# Patient Record
Sex: FEMALE | Race: WHITE | NOT HISPANIC OR LATINO | Employment: FULL TIME | URBAN - NONMETROPOLITAN AREA
[De-identification: names, ages, dates, MRNs, and addresses within clinical notes are randomized per-mention and may not be internally consistent; named-entity substitution may affect disease eponyms.]

---

## 2023-03-01 LAB
ALANINE AMINOTRANSFERASE (SGPT) (U/L) IN SER/PLAS: 53 U/L (ref 7–45)
ALBUMIN (G/DL) IN SER/PLAS: 4 G/DL (ref 3.4–5)
ALKALINE PHOSPHATASE (U/L) IN SER/PLAS: 77 U/L (ref 33–136)
ANION GAP IN SER/PLAS: 12 MMOL/L (ref 10–20)
ASPARTATE AMINOTRANSFERASE (SGOT) (U/L) IN SER/PLAS: 44 U/L (ref 9–39)
BASOPHILS (10*3/UL) IN BLOOD BY AUTOMATED COUNT: 0.06 X10E9/L (ref 0–0.1)
BASOPHILS/100 LEUKOCYTES IN BLOOD BY AUTOMATED COUNT: 0.5 % (ref 0–2)
BILIRUBIN TOTAL (MG/DL) IN SER/PLAS: 0.3 MG/DL (ref 0–1.2)
CALCIUM (MG/DL) IN SER/PLAS: 10.6 MG/DL (ref 8.6–10.3)
CARBON DIOXIDE, TOTAL (MMOL/L) IN SER/PLAS: 22 MMOL/L (ref 21–32)
CHLORIDE (MMOL/L) IN SER/PLAS: 104 MMOL/L (ref 98–107)
CREATININE (MG/DL) IN SER/PLAS: 0.78 MG/DL (ref 0.5–1.05)
EOSINOPHILS (10*3/UL) IN BLOOD BY AUTOMATED COUNT: 0.09 X10E9/L (ref 0–0.7)
EOSINOPHILS/100 LEUKOCYTES IN BLOOD BY AUTOMATED COUNT: 0.8 % (ref 0–6)
ERYTHROCYTE DISTRIBUTION WIDTH (RATIO) BY AUTOMATED COUNT: 13.3 % (ref 11.5–14.5)
ERYTHROCYTE MEAN CORPUSCULAR HEMOGLOBIN CONCENTRATION (G/DL) BY AUTOMATED: 32.8 G/DL (ref 32–36)
ERYTHROCYTE MEAN CORPUSCULAR VOLUME (FL) BY AUTOMATED COUNT: 90 FL (ref 80–100)
ERYTHROCYTES (10*6/UL) IN BLOOD BY AUTOMATED COUNT: 5.1 X10E12/L (ref 4–5.2)
GFR FEMALE: 87 ML/MIN/1.73M2
GLUCOSE (MG/DL) IN SER/PLAS: 160 MG/DL (ref 74–99)
HEMATOCRIT (%) IN BLOOD BY AUTOMATED COUNT: 46 % (ref 36–46)
HEMOGLOBIN (G/DL) IN BLOOD: 15.1 G/DL (ref 12–16)
IMMATURE GRANULOCYTES/100 LEUKOCYTES IN BLOOD BY AUTOMATED COUNT: 0.8 % (ref 0–0.9)
LEUKOCYTES (10*3/UL) IN BLOOD BY AUTOMATED COUNT: 10.9 X10E9/L (ref 4.4–11.3)
LYMPHOCYTES (10*3/UL) IN BLOOD BY AUTOMATED COUNT: 2.87 X10E9/L (ref 1.2–4.8)
LYMPHOCYTES/100 LEUKOCYTES IN BLOOD BY AUTOMATED COUNT: 26.2 % (ref 13–44)
MONOCYTES (10*3/UL) IN BLOOD BY AUTOMATED COUNT: 0.69 X10E9/L (ref 0.1–1)
MONOCYTES/100 LEUKOCYTES IN BLOOD BY AUTOMATED COUNT: 6.3 % (ref 2–10)
NEUTROPHILS (10*3/UL) IN BLOOD BY AUTOMATED COUNT: 7.14 X10E9/L (ref 1.2–7.7)
NEUTROPHILS/100 LEUKOCYTES IN BLOOD BY AUTOMATED COUNT: 65.4 % (ref 40–80)
PLATELETS (10*3/UL) IN BLOOD AUTOMATED COUNT: 327 X10E9/L (ref 150–450)
POTASSIUM (MMOL/L) IN SER/PLAS: 4.2 MMOL/L (ref 3.5–5.3)
PROTEIN TOTAL: 7 G/DL (ref 6.4–8.2)
SODIUM (MMOL/L) IN SER/PLAS: 134 MMOL/L (ref 136–145)
UREA NITROGEN (MG/DL) IN SER/PLAS: 22 MG/DL (ref 6–23)

## 2023-03-02 LAB
ESTIMATED AVERAGE GLUCOSE FOR HBA1C: 180 MG/DL
HEMOGLOBIN A1C/HEMOGLOBIN TOTAL IN BLOOD: 7.9 %

## 2023-04-06 RX ORDER — ROPINIROLE 1 MG/1
1 TABLET, FILM COATED ORAL NIGHTLY
COMMUNITY
Start: 2019-04-04 | End: 2023-04-11 | Stop reason: SDUPTHER

## 2023-04-06 RX ORDER — AMLODIPINE BESYLATE 10 MG/1
1 TABLET ORAL DAILY
COMMUNITY
Start: 2019-04-04 | End: 2023-04-11 | Stop reason: SDUPTHER

## 2023-04-06 RX ORDER — BLOOD-GLUCOSE METER
EACH MISCELLANEOUS
COMMUNITY
Start: 2022-11-17 | End: 2024-04-01 | Stop reason: SDUPTHER

## 2023-04-06 RX ORDER — LANCETS 33 GAUGE
EACH MISCELLANEOUS
COMMUNITY
Start: 2022-11-22 | End: 2024-04-01 | Stop reason: SDUPTHER

## 2023-04-06 RX ORDER — ACETAMINOPHEN 500 MG
TABLET ORAL
COMMUNITY
Start: 2022-06-30

## 2023-04-06 RX ORDER — LANCETS 30 GAUGE
EACH MISCELLANEOUS
COMMUNITY
Start: 2022-11-17

## 2023-04-06 RX ORDER — LOSARTAN POTASSIUM 50 MG/1
TABLET ORAL
COMMUNITY
Start: 2023-01-18 | End: 2023-04-11 | Stop reason: SDUPTHER

## 2023-04-10 PROBLEM — M47.817 LUMBAR AND SACRAL SPONDYLARTHRITIS: Status: ACTIVE | Noted: 2023-04-10

## 2023-04-10 PROBLEM — M54.16 RIGHT LUMBAR RADICULOPATHY: Status: ACTIVE | Noted: 2023-04-10

## 2023-04-10 PROBLEM — L82.1 SEBORRHEIC KERATOSES: Status: ACTIVE | Noted: 2023-04-10

## 2023-04-10 PROBLEM — K21.9 GERD (GASTROESOPHAGEAL REFLUX DISEASE): Status: ACTIVE | Noted: 2023-04-10

## 2023-04-10 PROBLEM — E78.5 DYSLIPIDEMIA: Status: ACTIVE | Noted: 2023-04-10

## 2023-04-10 PROBLEM — D72.829 LEUCOCYTOSIS: Status: ACTIVE | Noted: 2023-04-10

## 2023-04-10 PROBLEM — R53.83 FATIGUE: Status: ACTIVE | Noted: 2023-04-10

## 2023-04-10 PROBLEM — R79.89 ABNORMAL LFTS (LIVER FUNCTION TESTS): Status: ACTIVE | Noted: 2023-04-10

## 2023-04-10 PROBLEM — E83.52 HYPERCALCEMIA: Status: ACTIVE | Noted: 2023-04-10

## 2023-04-10 PROBLEM — L03.032 CELLULITIS OF TOE OF LEFT FOOT: Status: ACTIVE | Noted: 2023-04-10

## 2023-04-10 PROBLEM — M25.552 LEFT HIP PAIN: Status: ACTIVE | Noted: 2023-04-10

## 2023-04-10 PROBLEM — R79.89 ABNORMAL TSH: Status: ACTIVE | Noted: 2023-04-10

## 2023-04-10 PROBLEM — E11.69 DIABETES MELLITUS TYPE 2 IN OBESE: Status: ACTIVE | Noted: 2023-04-10

## 2023-04-10 PROBLEM — F41.9 ANXIETY: Status: ACTIVE | Noted: 2023-04-10

## 2023-04-10 PROBLEM — G25.81 RESTLESS LEGS SYNDROME: Status: ACTIVE | Noted: 2023-04-10

## 2023-04-10 PROBLEM — M70.61 GREATER TROCHANTERIC BURSITIS OF RIGHT HIP: Status: ACTIVE | Noted: 2023-04-10

## 2023-04-10 PROBLEM — Z96.641 PRESENCE OF RIGHT ARTIFICIAL HIP JOINT: Status: ACTIVE | Noted: 2023-04-10

## 2023-04-10 PROBLEM — E11.9 DM (DIABETES MELLITUS), TYPE 2 (MULTI): Status: ACTIVE | Noted: 2023-04-10

## 2023-04-10 PROBLEM — M25.551 HIP PAIN, RIGHT: Status: ACTIVE | Noted: 2023-04-10

## 2023-04-10 PROBLEM — R73.9 HYPERGLYCEMIA: Status: ACTIVE | Noted: 2023-04-10

## 2023-04-10 PROBLEM — R35.0 URINARY FREQUENCY: Status: ACTIVE | Noted: 2023-04-10

## 2023-04-10 PROBLEM — M62.838 MUSCLE SPASM: Status: ACTIVE | Noted: 2023-04-10

## 2023-04-10 PROBLEM — M48.061 NEUROFORAMINAL STENOSIS OF LUMBAR SPINE: Status: ACTIVE | Noted: 2023-04-10

## 2023-04-10 PROBLEM — R80.9 PROTEINURIA: Status: ACTIVE | Noted: 2023-04-10

## 2023-04-10 PROBLEM — E66.9 DIABETES MELLITUS TYPE 2 IN OBESE: Status: ACTIVE | Noted: 2023-04-10

## 2023-04-10 PROBLEM — E55.9 VITAMIN D DEFICIENCY: Status: ACTIVE | Noted: 2023-04-10

## 2023-04-10 PROBLEM — I10 BENIGN ESSENTIAL HYPERTENSION: Status: ACTIVE | Noted: 2023-04-10

## 2023-04-11 ENCOUNTER — OFFICE VISIT (OUTPATIENT)
Dept: PRIMARY CARE | Facility: CLINIC | Age: 63
End: 2023-04-11
Payer: COMMERCIAL

## 2023-04-11 VITALS
OXYGEN SATURATION: 97 % | SYSTOLIC BLOOD PRESSURE: 122 MMHG | WEIGHT: 291 LBS | DIASTOLIC BLOOD PRESSURE: 80 MMHG | BODY MASS INDEX: 45.58 KG/M2 | HEART RATE: 73 BPM

## 2023-04-11 DIAGNOSIS — E66.01 CLASS 3 SEVERE OBESITY DUE TO EXCESS CALORIES WITH SERIOUS COMORBIDITY AND BODY MASS INDEX (BMI) OF 45.0 TO 49.9 IN ADULT (MULTI): ICD-10-CM

## 2023-04-11 DIAGNOSIS — G25.81 RESTLESS LEG SYNDROME: ICD-10-CM

## 2023-04-11 DIAGNOSIS — R92.30 DENSE BREASTS: ICD-10-CM

## 2023-04-11 DIAGNOSIS — R79.89 ABNORMAL TSH: ICD-10-CM

## 2023-04-11 DIAGNOSIS — I10 HYPERTENSION, UNSPECIFIED TYPE: ICD-10-CM

## 2023-04-11 DIAGNOSIS — E78.5 DYSLIPIDEMIA: ICD-10-CM

## 2023-04-11 DIAGNOSIS — R92.2 DENSE BREASTS: ICD-10-CM

## 2023-04-11 DIAGNOSIS — K44.9 HIATAL HERNIA: ICD-10-CM

## 2023-04-11 DIAGNOSIS — E83.52 HYPERCALCEMIA: ICD-10-CM

## 2023-04-11 DIAGNOSIS — R79.89 ABNORMAL LFTS (LIVER FUNCTION TESTS): ICD-10-CM

## 2023-04-11 DIAGNOSIS — E11.69 TYPE 2 DIABETES MELLITUS WITH OTHER SPECIFIED COMPLICATION, WITHOUT LONG-TERM CURRENT USE OF INSULIN (MULTI): Primary | ICD-10-CM

## 2023-04-11 PROBLEM — E66.813 CLASS 3 SEVERE OBESITY DUE TO EXCESS CALORIES WITH SERIOUS COMORBIDITY AND BODY MASS INDEX (BMI) OF 45.0 TO 49.9 IN ADULT: Status: ACTIVE | Noted: 2023-04-11

## 2023-04-11 PROCEDURE — 3008F BODY MASS INDEX DOCD: CPT | Performed by: INTERNAL MEDICINE

## 2023-04-11 PROCEDURE — 4010F ACE/ARB THERAPY RXD/TAKEN: CPT | Performed by: INTERNAL MEDICINE

## 2023-04-11 PROCEDURE — 3079F DIAST BP 80-89 MM HG: CPT | Performed by: INTERNAL MEDICINE

## 2023-04-11 PROCEDURE — 99214 OFFICE O/P EST MOD 30 MIN: CPT | Performed by: INTERNAL MEDICINE

## 2023-04-11 PROCEDURE — G0447 BEHAVIOR COUNSEL OBESITY 15M: HCPCS | Performed by: INTERNAL MEDICINE

## 2023-04-11 PROCEDURE — 3051F HG A1C>EQUAL 7.0%<8.0%: CPT | Performed by: INTERNAL MEDICINE

## 2023-04-11 PROCEDURE — 3074F SYST BP LT 130 MM HG: CPT | Performed by: INTERNAL MEDICINE

## 2023-04-11 PROCEDURE — 1036F TOBACCO NON-USER: CPT | Performed by: INTERNAL MEDICINE

## 2023-04-11 RX ORDER — ROPINIROLE 1 MG/1
1 TABLET, FILM COATED ORAL NIGHTLY
Qty: 90 TABLET | Refills: 0 | Status: SHIPPED | OUTPATIENT
Start: 2023-04-11 | End: 2024-04-18 | Stop reason: SDUPTHER

## 2023-04-11 RX ORDER — EZETIMIBE 10 MG/1
TABLET ORAL
COMMUNITY
Start: 2023-04-07 | End: 2023-11-20 | Stop reason: ALTCHOICE

## 2023-04-11 RX ORDER — ASPIRIN 81 MG/1
TABLET ORAL
COMMUNITY
Start: 2023-04-06 | End: 2023-11-20 | Stop reason: WASHOUT

## 2023-04-11 RX ORDER — LOSARTAN POTASSIUM 50 MG/1
TABLET ORAL
Qty: 90 TABLET | Refills: 0 | Status: SHIPPED | OUTPATIENT
Start: 2023-04-11 | End: 2023-07-10 | Stop reason: SDUPTHER

## 2023-04-11 RX ORDER — AMLODIPINE BESYLATE 10 MG/1
10 TABLET ORAL DAILY
Qty: 90 TABLET | Refills: 0 | Status: SHIPPED | OUTPATIENT
Start: 2023-04-11 | End: 2023-07-10 | Stop reason: SDUPTHER

## 2023-04-11 NOTE — PROGRESS NOTES
Subjective   Patient ID: Lindsey Herrera is a 62 y.o. female who presents for Follow-up (6 wk medical management /Discuss hiatal hernia pt has ).    HPI    Patient came in today for follow-up.     Hiatal hernia diet discussed patient without significant discomfort at this time.    Hypertension stable on therapy no side effects    Hypercholesterolemia patient on therapy no side effects    CAD by elevated CT cardiac score patient on aspirin.  Cardiology following.    DM A1c 7.9 patient refuses medication risk benefits reviewed.  She will recheck her A1c before her next appointment    Dense breast tissue on mammogram patient not interested in MRI of the breast at this time.    Hypercalcemia improved continue to follow    Abnormal AST and ALT improved continue to follow    Diet and exercise reviewed      Review of Systems   All other systems reviewed and are negative.      Objective   Physical Exam  Vitals reviewed.   Constitutional:       Appearance: Normal appearance. She is obese.   HENT:      Head: Normocephalic and atraumatic.      Mouth/Throat:      Pharynx: No posterior oropharyngeal erythema.   Eyes:      General: No scleral icterus.     Conjunctiva/sclera: Conjunctivae normal.      Pupils: Pupils are equal, round, and reactive to light.   Cardiovascular:      Rate and Rhythm: Normal rate and regular rhythm.      Heart sounds: Normal heart sounds.   Pulmonary:      Effort: No respiratory distress.      Breath sounds: No wheezing.   Abdominal:      General: Abdomen is flat. Bowel sounds are normal. There is no distension.      Palpations: Abdomen is soft. There is no mass.      Tenderness: There is no abdominal tenderness. There is no rebound.   Musculoskeletal:         General: Normal range of motion.      Cervical back: Normal range of motion and neck supple.   Skin:     General: Skin is warm and dry.   Neurological:      General: No focal deficit present.      Mental Status: She is alert and oriented to person,  place, and time. Mental status is at baseline.   Psychiatric:         Mood and Affect: Mood normal.         Behavior: Behavior normal.         Thought Content: Thought content normal.         Judgment: Judgment normal.         Assessment/Plan   Problem List Items Addressed This Visit          Respiratory    Hiatal hernia       Digestive    Abnormal LFTs (liver function tests)       Endocrine/Metabolic    DM (diabetes mellitus), type 2 (CMS/HCC) - Primary    Relevant Orders    Hemoglobin A1C    Class 3 severe obesity due to excess calories with serious comorbidity and body mass index (BMI) of 45.0 to 49.9 in adult (CMS/HCC)       Other    Abnormal TSH    Relevant Orders    TSH with reflex to Free T4 if abnormal    Dyslipidemia    Hypercalcemia    Dense breasts     Other Visit Diagnoses       Hypertension, unspecified type        Relevant Medications    amLODIPine (Norvasc) 10 mg tablet    losartan (Cozaar) 50 mg tablet    Restless leg syndrome        Relevant Medications    rOPINIRole (Requip) 1 mg tablet          Hiatal hernia diet discussed patient without significant discomfort at this time. Follow    Hypertension stable on therapy no side effects    Hypercholesterolemia patient on therapy no side effects  Cardio will recheck in october    CAD by elevated CT cardiac score patient on aspirin.  Cardiology following.    DM A1c 7.9 patient refuses medication risk / benefits reviewed.  She will recheck her A1c before her next appointment  FBS not checked either    Dense breast tissue on mammogram patient not interested in MRI of the breast at this time.    Hypercalcemia improved continue to follow    Abnormal AST and ALT improved continue to follow    Diet and exercise reviewed  I spent >15 minutes minutes face to face with individual  providing recommendations for nutrition choices and exercise plan to help achieve weight reduction.     Follow up 3 months

## 2023-07-07 PROBLEM — H60.509 ACUTE OTITIS EXTERNA: Status: ACTIVE | Noted: 2023-07-07

## 2023-07-07 PROBLEM — N39.0 ACUTE UTI: Status: ACTIVE | Noted: 2023-07-07

## 2023-07-07 PROBLEM — R94.31 ABNORMAL EKG: Status: ACTIVE | Noted: 2023-07-07

## 2023-07-07 PROBLEM — R92.30 DENSE BREAST TISSUE ON MAMMOGRAM: Status: ACTIVE | Noted: 2023-07-07

## 2023-07-08 ENCOUNTER — APPOINTMENT (OUTPATIENT)
Dept: LAB | Facility: LAB | Age: 63
End: 2023-07-08
Payer: COMMERCIAL

## 2023-07-08 LAB
ESTIMATED AVERAGE GLUCOSE FOR HBA1C: 171 MG/DL
HEMOGLOBIN A1C/HEMOGLOBIN TOTAL IN BLOOD: 7.6 %
THYROTROPIN (MIU/L) IN SER/PLAS BY DETECTION LIMIT <= 0.05 MIU/L: 3.8 MIU/L (ref 0.44–3.98)

## 2023-07-08 PROCEDURE — 36415 COLL VENOUS BLD VENIPUNCTURE: CPT

## 2023-07-08 PROCEDURE — 83036 HEMOGLOBIN GLYCOSYLATED A1C: CPT

## 2023-07-08 PROCEDURE — 84443 ASSAY THYROID STIM HORMONE: CPT

## 2023-07-10 ENCOUNTER — OFFICE VISIT (OUTPATIENT)
Dept: PRIMARY CARE | Facility: CLINIC | Age: 63
End: 2023-07-10
Payer: COMMERCIAL

## 2023-07-10 VITALS
OXYGEN SATURATION: 96 % | DIASTOLIC BLOOD PRESSURE: 88 MMHG | HEART RATE: 83 BPM | BODY MASS INDEX: 46.83 KG/M2 | WEIGHT: 293 LBS | SYSTOLIC BLOOD PRESSURE: 146 MMHG

## 2023-07-10 DIAGNOSIS — E83.52 HYPERCALCEMIA: ICD-10-CM

## 2023-07-10 DIAGNOSIS — E66.01 CLASS 3 SEVERE OBESITY DUE TO EXCESS CALORIES WITH SERIOUS COMORBIDITY AND BODY MASS INDEX (BMI) OF 45.0 TO 49.9 IN ADULT (MULTI): ICD-10-CM

## 2023-07-10 DIAGNOSIS — E78.5 DYSLIPIDEMIA: ICD-10-CM

## 2023-07-10 DIAGNOSIS — E11.69 TYPE 2 DIABETES MELLITUS WITH OTHER SPECIFIED COMPLICATION, WITHOUT LONG-TERM CURRENT USE OF INSULIN (MULTI): ICD-10-CM

## 2023-07-10 DIAGNOSIS — I25.10 CORONARY ARTERY DISEASE INVOLVING NATIVE CORONARY ARTERY OF NATIVE HEART WITHOUT ANGINA PECTORIS: ICD-10-CM

## 2023-07-10 DIAGNOSIS — Z00.00 ANNUAL PHYSICAL EXAM: Primary | ICD-10-CM

## 2023-07-10 DIAGNOSIS — K44.9 HIATAL HERNIA: ICD-10-CM

## 2023-07-10 DIAGNOSIS — I10 HYPERTENSION, UNSPECIFIED TYPE: ICD-10-CM

## 2023-07-10 DIAGNOSIS — R74.8 ABNORMAL AST AND ALT: ICD-10-CM

## 2023-07-10 PROCEDURE — 99396 PREV VISIT EST AGE 40-64: CPT | Performed by: INTERNAL MEDICINE

## 2023-07-10 PROCEDURE — 3077F SYST BP >= 140 MM HG: CPT | Performed by: INTERNAL MEDICINE

## 2023-07-10 PROCEDURE — 99215 OFFICE O/P EST HI 40 MIN: CPT | Performed by: INTERNAL MEDICINE

## 2023-07-10 PROCEDURE — 1036F TOBACCO NON-USER: CPT | Performed by: INTERNAL MEDICINE

## 2023-07-10 PROCEDURE — 3008F BODY MASS INDEX DOCD: CPT | Performed by: INTERNAL MEDICINE

## 2023-07-10 PROCEDURE — 3051F HG A1C>EQUAL 7.0%<8.0%: CPT | Performed by: INTERNAL MEDICINE

## 2023-07-10 PROCEDURE — 3079F DIAST BP 80-89 MM HG: CPT | Performed by: INTERNAL MEDICINE

## 2023-07-10 PROCEDURE — 4010F ACE/ARB THERAPY RXD/TAKEN: CPT | Performed by: INTERNAL MEDICINE

## 2023-07-10 RX ORDER — LOSARTAN POTASSIUM 50 MG/1
TABLET ORAL
Qty: 90 TABLET | Refills: 0 | Status: SHIPPED | OUTPATIENT
Start: 2023-07-10 | End: 2023-09-17

## 2023-07-10 RX ORDER — AMLODIPINE BESYLATE 10 MG/1
10 TABLET ORAL DAILY
Qty: 90 TABLET | Refills: 0 | Status: SHIPPED | OUTPATIENT
Start: 2023-07-10 | End: 2023-09-17

## 2023-07-10 ASSESSMENT — ENCOUNTER SYMPTOMS: HYPERTENSION: 1

## 2023-07-10 NOTE — PROGRESS NOTES
"Subjective   Patient ID: Lindsey Herrera is a 62 y.o. female who presents for yearly physical / Hypertension, Hyperlipidemia, Diabetes Mellitus, and Med Management.  Hypertension    Hyperlipidemia  Yearly physical    Mammogram 11-22  Dexa none  Colonoscopy 17  was to recheck 3-5 yrs / past due  CT chest lung cancer screening n/a  GYN past due  immunizations rev'd  BMI 46.8    Follow up    Hiatal hernia diet discussed patient without significant discomfort at this time. Follow     Hypertension on therapy no side effects  \"Bad day\"  today     Hypercholesterolemia patient on therapy no side effects  Cardio will recheck in october     CAD by elevated CT cardiac score patient on aspirin.  Cardiology following.     DM A1c 7.6  7/23 patient still refuses medication risk / benefits reviewed.  She will recheck her A1c before her next appointment with cardio  FBS not checked either     Dense breast tissue on mammogram patient not interested in MRI of the breast.     Hypercalcemia improved continue to follow     Abnormal AST and ALT improved continue to follow     Diet and exercise reviewed  I spent >15 minutes minutes face to face with individual  providing recommendations for nutrition choices and exercise plan to help achieve weight reduction.       Review of Systems   All other systems reviewed and are negative.      Objective   /88   Pulse 83   Wt 136 kg (299 lb)   SpO2 96%   BMI 46.83 kg/m²   Lab Results   Component Value Date    WBC 10.9 03/01/2023    HGB 15.1 03/01/2023    HCT 46.0 03/01/2023     03/01/2023    CHOL 233 (H) 09/03/2022    TRIG 231 (H) 09/03/2022    HDL 49.0 09/03/2022    ALT 53 (H) 03/01/2023    AST 44 (H) 03/01/2023     (L) 03/01/2023    K 4.2 03/01/2023     03/01/2023    CREATININE 0.78 03/01/2023    BUN 22 03/01/2023    CO2 22 03/01/2023    TSH 3.80 07/08/2023    HGBA1C 7.6 (A) 07/08/2023           Physical Exam  Vitals reviewed.   Constitutional:       Appearance: Normal " appearance. She is obese.   HENT:      Head: Normocephalic and atraumatic.      Mouth/Throat:      Pharynx: No posterior oropharyngeal erythema.   Eyes:      General: No scleral icterus.     Conjunctiva/sclera: Conjunctivae normal.      Pupils: Pupils are equal, round, and reactive to light.   Cardiovascular:      Rate and Rhythm: Normal rate and regular rhythm.      Heart sounds: Normal heart sounds.   Pulmonary:      Effort: No respiratory distress.      Breath sounds: No wheezing.   Abdominal:      General: Abdomen is flat. Bowel sounds are normal. There is no distension.      Palpations: Abdomen is soft. There is no mass.      Tenderness: There is no abdominal tenderness. There is no rebound.   Musculoskeletal:         General: Normal range of motion.      Cervical back: Normal range of motion and neck supple.   Skin:     General: Skin is warm and dry.   Neurological:      General: No focal deficit present.      Mental Status: She is alert and oriented to person, place, and time. Mental status is at baseline.   Psychiatric:         Mood and Affect: Mood normal.         Behavior: Behavior normal.         Thought Content: Thought content normal.         Judgment: Judgment normal.       Problem List Items Addressed This Visit          Cardiac and Vasculature    Dyslipidemia       Endocrine/Metabolic    DM (diabetes mellitus), type 2 (CMS/HCC)    Class 3 severe obesity due to excess calories with serious comorbidity and body mass index (BMI) of 45.0 to 49.9 in adult (CMS/HCC)       Gastrointestinal and Abdominal    Hiatal hernia       Genitourinary and Reproductive    Hypercalcemia     Other Visit Diagnoses       Annual physical exam    -  Primary    Coronary artery disease involving native coronary artery of native heart without angina pectoris        Relevant Medications    amLODIPine (Norvasc) 10 mg tablet    Hypertension, unspecified type        Relevant Medications    amLODIPine (Norvasc) 10 mg tablet     "losartan (Cozaar) 50 mg tablet    Abnormal AST and ALT              Assessment/Plan     Yearly physical    Mammogram 11-22  Dexa none  Colonoscopy 17  was to recheck 3-5 yrs / past due  CT chest lung cancer screening n/a  GYN past due  immunizations rev'd  BMI 46.8    Follow up    Hiatal hernia diet discussed patient without significant discomfort at this time. Follow  Using rx prn     Hypertension on therapy no side effects  \"Bad day\"  today    follow     Hypercholesterolemia patient on therapy no side effects  Cardio will recheck in october     CAD by elevated CT cardiac score patient on aspirin.  Cardiology following.     DM A1c 7.6  7/23 patient still refuses medication risk / benefits reviewed.  She will recheck her A1c before her next appointment with cardio  FBS not checked either  Aware of risks including but not limited to  MI, stroke, blindness, renal failure and death     Dense breast tissue on mammogram patient not interested in MRI of the breast.     Hypercalcemia improved continue to follow     Abnormal AST and ALT improved continue to follow     Diet and exercise reviewed  I spent >15 minutes minutes face to face with individual  providing recommendations for nutrition choices and exercise plan to help achieve weight reduction.     Follow up labs per cardio in fall    GYN, colonoscopy, mammogram and dexa in fall    Follow up 3 months  "

## 2023-08-17 DIAGNOSIS — M62.838 MUSCLE SPASM: Primary | ICD-10-CM

## 2023-08-17 RX ORDER — TIZANIDINE HYDROCHLORIDE 4 MG/1
4 CAPSULE, GELATIN COATED ORAL DAILY PRN
Qty: 30 CAPSULE | Refills: 0 | Status: SHIPPED | OUTPATIENT
Start: 2023-08-17 | End: 2023-11-20 | Stop reason: WASHOUT

## 2023-09-16 DIAGNOSIS — I10 HYPERTENSION, UNSPECIFIED TYPE: ICD-10-CM

## 2023-09-17 RX ORDER — AMLODIPINE BESYLATE 10 MG/1
10 TABLET ORAL DAILY
Qty: 90 TABLET | Refills: 0 | Status: SHIPPED | OUTPATIENT
Start: 2023-09-17 | End: 2023-11-20 | Stop reason: SDUPTHER

## 2023-09-17 RX ORDER — LOSARTAN POTASSIUM 50 MG/1
TABLET ORAL
Qty: 90 TABLET | Refills: 0 | Status: SHIPPED | OUTPATIENT
Start: 2023-09-17 | End: 2023-11-20 | Stop reason: SDUPTHER

## 2023-10-13 DIAGNOSIS — M54.16 LUMBAR RADICULOPATHY: Primary | ICD-10-CM

## 2023-10-17 ENCOUNTER — APPOINTMENT (OUTPATIENT)
Dept: PRIMARY CARE | Facility: CLINIC | Age: 63
End: 2023-10-17
Payer: COMMERCIAL

## 2023-10-24 ENCOUNTER — HOSPITAL ENCOUNTER (OUTPATIENT)
Dept: RADIOLOGY | Facility: HOSPITAL | Age: 63
Discharge: HOME | End: 2023-10-24
Payer: COMMERCIAL

## 2023-10-24 ENCOUNTER — HOSPITAL ENCOUNTER (OUTPATIENT)
Dept: PAIN MEDICINE | Facility: HOSPITAL | Age: 63
Discharge: HOME | End: 2023-10-24
Payer: COMMERCIAL

## 2023-10-24 VITALS
BODY MASS INDEX: 45.99 KG/M2 | HEART RATE: 86 BPM | HEIGHT: 67 IN | TEMPERATURE: 97.2 F | DIASTOLIC BLOOD PRESSURE: 104 MMHG | RESPIRATION RATE: 16 BRPM | SYSTOLIC BLOOD PRESSURE: 148 MMHG | WEIGHT: 293 LBS | OXYGEN SATURATION: 96 %

## 2023-10-24 DIAGNOSIS — M54.17 LUMBOSACRAL RADICULOPATHY AT L5: ICD-10-CM

## 2023-10-24 DIAGNOSIS — M47.817 LUMBAR AND SACRAL SPONDYLARTHRITIS: Primary | ICD-10-CM

## 2023-10-24 DIAGNOSIS — M54.16 RIGHT LUMBAR RADICULOPATHY: ICD-10-CM

## 2023-10-24 PROCEDURE — 7100000010 HC PHASE TWO TIME - EACH INCREMENTAL 1 MINUTE: Performed by: ANESTHESIOLOGY

## 2023-10-24 PROCEDURE — 77003 FLUOROGUIDE FOR SPINE INJECT: CPT | Mod: CCI

## 2023-10-24 PROCEDURE — 2500000001 HC RX 250 WO HCPCS SELF ADMINISTERED DRUGS (ALT 637 FOR MEDICARE OP): Performed by: REGISTERED NURSE

## 2023-10-24 PROCEDURE — 7100000009 HC PHASE TWO TIME - INITIAL BASE CHARGE: Performed by: ANESTHESIOLOGY

## 2023-10-24 PROCEDURE — 62323 NJX INTERLAMINAR LMBR/SAC: CPT | Performed by: ANESTHESIOLOGY

## 2023-10-24 RX ORDER — SODIUM CHLORIDE, SODIUM LACTATE, POTASSIUM CHLORIDE, CALCIUM CHLORIDE 600; 310; 30; 20 MG/100ML; MG/100ML; MG/100ML; MG/100ML
30 INJECTION, SOLUTION INTRAVENOUS CONTINUOUS
Status: CANCELLED | OUTPATIENT
Start: 2023-10-24

## 2023-10-24 RX ORDER — CIPROFLOXACIN 500 MG/1
500 TABLET ORAL ONCE
Status: COMPLETED | OUTPATIENT
Start: 2023-10-24 | End: 2023-10-24

## 2023-10-24 RX ORDER — CIPROFLOXACIN 500 MG/1
TABLET ORAL
Status: DISCONTINUED
Start: 2023-10-24 | End: 2023-10-24 | Stop reason: HOSPADM

## 2023-10-24 RX ORDER — NAPROXEN 250 MG/1
250 TABLET ORAL
COMMUNITY

## 2023-10-24 RX ADMIN — CIPROFLOXACIN 500 MG: 500 TABLET, FILM COATED ORAL at 11:06

## 2023-10-24 ASSESSMENT — COLUMBIA-SUICIDE SEVERITY RATING SCALE - C-SSRS
6. HAVE YOU EVER DONE ANYTHING, STARTED TO DO ANYTHING, OR PREPARED TO DO ANYTHING TO END YOUR LIFE?: NO
2. HAVE YOU ACTUALLY HAD ANY THOUGHTS OF KILLING YOURSELF?: NO
1. IN THE PAST MONTH, HAVE YOU WISHED YOU WERE DEAD OR WISHED YOU COULD GO TO SLEEP AND NOT WAKE UP?: NO

## 2023-10-24 ASSESSMENT — ENCOUNTER SYMPTOMS
FATIGUE: 0
NAUSEA: 0
WOUND: 0
CHILLS: 0
MUSCULOSKELETAL NEGATIVE: 1
FEVER: 0
DIARRHEA: 0
SHORTNESS OF BREATH: 0
PAIN SCALE: 5/10
DIAPHORESIS: 0
VOMITING: 0
NEUROLOGICAL NEGATIVE: 1

## 2023-10-24 ASSESSMENT — PAIN SCALES - GENERAL
PAINLEVEL_OUTOF10: 5 - MODERATE PAIN
PAINLEVEL_OUTOF10: 0 - NO PAIN

## 2023-10-24 ASSESSMENT — PAIN - FUNCTIONAL ASSESSMENT
PAIN_FUNCTIONAL_ASSESSMENT: 0-10
PAIN_FUNCTIONAL_ASSESSMENT: 0-10

## 2023-10-24 NOTE — DISCHARGE INSTRUCTIONS
Discharge Instructions:   ° Keep Band-Aid on for the next 24 hours.    ° No showering/bathing for the next 24 hours.    ° You may notice soreness or increased pain in the area of your injection, which may continue for the first 48 hours.    ° Avoid driving or operating any heavy machinery until the next day after the procedure.  ° You should resume any medications and your regular diet after the procedure.  ° You may resume regular daily activity but should avoid strenuous activity the day of the procedure.  Some of the side affects you may experience from the steroids are:  ° Insomnia (inability to sleep)  ° Increased sweating  ° Headaches  ° Increased fluid retention (swelling of your extremities)  ° Increase appetite  ° Face flushing  ° If you are a diabetic, your blood sugars may go up.  Closely monitor your diet.  Your blood sugar should return to normal in a few days.  Complications:   ° Complications are rare with the most common being temporary increase pain near the injection site. You can apply ice to affected area on the day of the procedure.   ° If the discomfort persists, apply moist heat to the area. Serious complications are very uncommon but may include bleeding, infection or nerve damage.   ° If severe pain, fever, redness or swelling near the injection site, have someone take you to the nearest emergency room to be evaluated for procedure complications or infection.  Expectations:   ° Local anesthetics wear off in several hours but duration of relief varies from individual to individual.     If you have any questions, please call the office at [phone].    If this is an emergency, call [phone] or go to your nearest hospital.

## 2023-10-24 NOTE — OP NOTE
Date: 10/24/2023  OR Location: [facility]OR PROCEDURES    Name: Lindsey Herrera : 1960, Age: 63 y.o., MRN: [id number], Sex: female    Diagnosis Pre-op Diagnosis     * Lumbosacral radiculopathy at L5 [M54.17]  Patient has no changes in health medical management or allergies since last visit.    Preop diagnosis: Lumbosacral radiculopathy  Postoperative diagnosis: The same  Complication: None   Anesthesia: Local   PROCEDURE L5-S1  Lumbar Interlaminar Epidural    Epidural steroid injection    Date/Time: 10/24/2023 11:54 AM    Performed by: Anoop Hernandez DO  Authorized by: Anoop Hernandez DO    Pre-procedure:     Patient's pain severity pre-procedure:  5/10  Anesthesia (see MAR for exact dosages):     Anesthesia method:  Local infiltration  Needle:     Number of needles:  1  Procedure:     Needle position inspection: anteroposterior    Post-procedure:     Needle was removed: Yes      Complications: none      Patient's pain severity post-procedure:  4/10       CLINICAL NOTE: The patient is a pleasant 63 y.o.  female with a significant history of bilateral lower back pain with claudication/radiculopathy.  Physical exam and radiological findings correlate with the pre-operative diagnoses.  Because of these findings we elected to proceed with transforaminal epidural steroid injection at the above level (s).      PROCEDURE: After sufficient consent was signed, the patient was brought to the Operating Room, and placed in a prone position with a pillow underneath the hip. The patient's lumbar spine was in a flexed position. The lumbar area was then prepped and draped in the usual fashion.     Under fluoroscopic guidance, the L5-S1 interspace was identified. The skin at the entry site was infiltrated with 1% Lidocaine using a size 18 gauge needle. A size Touhy needle 18G, 3.5 inch was introduced gradually until the inferior lamina of the interspace was encountered. The needle was gradually advanced until it  entered into the ligament. At that time loss of resistance technique was used, and the needle advanced gradually until the negative pressure was noticed in the epidural space. The bevel of the needle was placed laterally, and 3 cc of Omnipaque 300 were injected. This revealed excellent distribution of the dye in the epidural space, and around the nerve root of the affected side of the lumbar spine. At that time, a mixture of Kenalog 20 mg Lidocaine 30  mg and Normal Saline for a total volume of 10 cc were injected . The needle was flushed and removed.     The patient tolerated the procedure very well and was transferred to the Recovery Room in stable condition.  The patient had stable resolution of symptoms.  Patient to follow-up in the Pain Management Clinic as scheduled.

## 2023-10-24 NOTE — H&P
History Of Present Illness  Lindsey Herrera is a 63 y.o. female presenting with lumbar radiculitis, lumbar and sacral spondylarthritis. She was last seen by Jocelyn Quiñones NP in the pain clinic on 8/28. She was a previous Dr. Strong patient. Her last injection ith him was more than a year ago. She is here for lumbar epidural injection. States pain is a 5/10 mid to lower back. Worse at night about 15/10. States sometimes when sitting if she moves a certain way it will elicit sharp shooting pain down her legs. Overall doing well. No fever, chills, SOB, CP, n/v/d.      Past Medical History  Past Medical History:   Diagnosis Date    Essential (primary) hypertension 09/20/2022    Benign essential hypertension    Gastro-esophageal reflux disease without esophagitis 03/02/2022    GERD (gastroesophageal reflux disease)    Hyperglycemia, unspecified 06/30/2021    Hyperglycemia    Hyperlipidemia, unspecified 09/20/2022    Dyslipidemia    Other muscle spasm 12/01/2022    Muscle spasm    Presence of right artificial hip joint 06/30/2022    Presence of right artificial hip joint    Proteinuria, unspecified 09/20/2022    Proteinuria    Restless legs syndrome 12/01/2022    Restless legs syndrome    Vitamin D deficiency, unspecified 09/01/2022    Vitamin D deficiency       Surgical History  Past Surgical History:   Procedure Laterality Date    OTHER SURGICAL HISTORY  04/04/2019    Cholecystectomy    OTHER SURGICAL HISTORY  04/04/2019    Tubal ligation    OTHER SURGICAL HISTORY  04/04/2019    Hip replacement    OTHER SURGICAL HISTORY  04/04/2019    Tonsillectomy    OTHER SURGICAL HISTORY  07/01/2021    Complete colonoscopy    OTHER SURGICAL HISTORY  07/01/2021    Complete colonoscopy    OTHER SURGICAL HISTORY  07/01/2021    Complete colonoscopy        Social History  She reports that she has never smoked. She has never been exposed to tobacco smoke. She has never used smokeless tobacco. She reports current alcohol use. She reports that  "she does not use drugs.    Family History  Family History   Problem Relation Name Age of Onset    Diabetes Mother      COPD Mother      Thyroid disease Mother          Allergies  Ace inhibitors, Atorvastatin, Erythromycin, Metformin, Metoprolol, Pseudoephedrine hcl, and Triamterene-hydrochlorothiazid    Review of Systems   Constitutional:  Negative for chills, diaphoresis, fatigue and fever.   HENT: Negative.     Respiratory:  Negative for shortness of breath.    Cardiovascular:  Negative for chest pain.   Gastrointestinal:  Negative for diarrhea, nausea and vomiting.   Genitourinary: Negative.    Musculoskeletal: Negative.    Skin:  Negative for pallor, rash and wound.   Neurological: Negative.         Physical Exam  Constitutional:       General: She is not in acute distress.     Appearance: Normal appearance.   HENT:      Head: Normocephalic.      Mouth/Throat:      Mouth: Mucous membranes are moist.   Eyes:      Conjunctiva/sclera: Conjunctivae normal.      Pupils: Pupils are equal, round, and reactive to light.   Cardiovascular:      Rate and Rhythm: Normal rate and regular rhythm.      Pulses: Normal pulses.      Heart sounds: Normal heart sounds.   Pulmonary:      Effort: Pulmonary effort is normal. No respiratory distress.      Breath sounds: Normal breath sounds.   Abdominal:      General: Abdomen is flat.      Palpations: Abdomen is soft.   Musculoskeletal:         General: Normal range of motion.   Skin:     General: Skin is warm and dry.   Neurological:      General: No focal deficit present.      Mental Status: She is alert and oriented to person, place, and time. Mental status is at baseline.   Psychiatric:         Mood and Affect: Mood normal.          Last Recorded Vitals  Blood pressure (!) 172/96, pulse 87, temperature 36.2 °C (97.2 °F), temperature source Temporal, resp. rate 17, height 1.702 m (5' 7\"), weight 136 kg (300 lb), SpO2 95 %.    Assessment/Plan   Lumbar radiculitis  Lumbar epidural " injection #1    I spent 20 minutes in the professional and overall care of this patient.      Judith Plunkett PA-C

## 2023-10-26 ASSESSMENT — PAIN SCALES - GENERAL: PAINLEVEL_OUTOF10: 2

## 2023-11-16 ENCOUNTER — APPOINTMENT (OUTPATIENT)
Dept: PRIMARY CARE | Facility: CLINIC | Age: 63
End: 2023-11-16
Payer: COMMERCIAL

## 2023-11-20 ENCOUNTER — OFFICE VISIT (OUTPATIENT)
Dept: PRIMARY CARE | Facility: CLINIC | Age: 63
End: 2023-11-20
Payer: COMMERCIAL

## 2023-11-20 VITALS
DIASTOLIC BLOOD PRESSURE: 86 MMHG | SYSTOLIC BLOOD PRESSURE: 146 MMHG | OXYGEN SATURATION: 95 % | HEART RATE: 109 BPM | BODY MASS INDEX: 47.46 KG/M2 | WEIGHT: 293 LBS

## 2023-11-20 DIAGNOSIS — E11.69 TYPE 2 DIABETES MELLITUS WITH OTHER SPECIFIED COMPLICATION, WITHOUT LONG-TERM CURRENT USE OF INSULIN (MULTI): Primary | ICD-10-CM

## 2023-11-20 DIAGNOSIS — E66.01 CLASS 3 SEVERE OBESITY DUE TO EXCESS CALORIES WITH SERIOUS COMORBIDITY AND BODY MASS INDEX (BMI) OF 45.0 TO 49.9 IN ADULT (MULTI): ICD-10-CM

## 2023-11-20 DIAGNOSIS — K44.9 HIATAL HERNIA: ICD-10-CM

## 2023-11-20 DIAGNOSIS — I25.10 CORONARY ARTERY DISEASE INVOLVING NATIVE CORONARY ARTERY OF NATIVE HEART WITHOUT ANGINA PECTORIS: ICD-10-CM

## 2023-11-20 DIAGNOSIS — E78.00 HYPERCHOLESTEREMIA: ICD-10-CM

## 2023-11-20 DIAGNOSIS — I10 HYPERTENSION, UNSPECIFIED TYPE: ICD-10-CM

## 2023-11-20 PROBLEM — K57.30 DIVERTICULAR DISEASE OF COLON: Status: ACTIVE | Noted: 2023-11-20

## 2023-11-20 PROBLEM — M16.11 ARTHRITIS OF RIGHT HIP: Status: ACTIVE | Noted: 2023-11-20

## 2023-11-20 PROBLEM — J30.9 ALLERGIC RHINITIS: Status: ACTIVE | Noted: 2023-11-20

## 2023-11-20 PROBLEM — K75.81 NONALCOHOLIC STEATOHEPATITIS (NASH): Status: ACTIVE | Noted: 2023-11-20

## 2023-11-20 PROBLEM — D12.5 BENIGN NEOPLASM OF SIGMOID COLON: Status: ACTIVE | Noted: 2023-11-20

## 2023-11-20 PROBLEM — E11.9 TYPE 2 DIABETES MELLITUS WITHOUT COMPLICATION (MULTI): Status: ACTIVE | Noted: 2023-11-20

## 2023-11-20 PROBLEM — K21.9 GASTRO-ESOPHAGEAL REFLUX DISEASE WITHOUT ESOPHAGITIS: Status: ACTIVE | Noted: 2023-11-20

## 2023-11-20 PROBLEM — I82.90 VENOUS THROMBOSIS: Status: ACTIVE | Noted: 2023-11-20

## 2023-11-20 PROBLEM — S99.929A INJURY OF TOE: Status: ACTIVE | Noted: 2023-11-20

## 2023-11-20 PROBLEM — R52 PAIN: Status: ACTIVE | Noted: 2023-11-20

## 2023-11-20 PROBLEM — K21.9 GASTROESOPHAGEAL REFLUX DISEASE: Status: ACTIVE | Noted: 2023-11-20

## 2023-11-20 PROBLEM — D12.3 BENIGN NEOPLASM OF TRANSVERSE COLON: Status: ACTIVE | Noted: 2023-11-20

## 2023-11-20 PROBLEM — E78.5 HYPERLIPIDEMIA: Status: ACTIVE | Noted: 2023-11-20

## 2023-11-20 PROBLEM — G25.81 RESTLESS LEGS: Status: ACTIVE | Noted: 2023-11-20

## 2023-11-20 PROCEDURE — 99214 OFFICE O/P EST MOD 30 MIN: CPT | Performed by: INTERNAL MEDICINE

## 2023-11-20 PROCEDURE — 3051F HG A1C>EQUAL 7.0%<8.0%: CPT | Performed by: INTERNAL MEDICINE

## 2023-11-20 PROCEDURE — 3079F DIAST BP 80-89 MM HG: CPT | Performed by: INTERNAL MEDICINE

## 2023-11-20 PROCEDURE — 1036F TOBACCO NON-USER: CPT | Performed by: INTERNAL MEDICINE

## 2023-11-20 PROCEDURE — 3077F SYST BP >= 140 MM HG: CPT | Performed by: INTERNAL MEDICINE

## 2023-11-20 PROCEDURE — 3008F BODY MASS INDEX DOCD: CPT | Performed by: INTERNAL MEDICINE

## 2023-11-20 PROCEDURE — 4010F ACE/ARB THERAPY RXD/TAKEN: CPT | Performed by: INTERNAL MEDICINE

## 2023-11-20 RX ORDER — LOSARTAN POTASSIUM 50 MG/1
TABLET ORAL
Qty: 90 TABLET | Refills: 0 | Status: SHIPPED | OUTPATIENT
Start: 2023-11-20 | End: 2024-02-26 | Stop reason: SDUPTHER

## 2023-11-20 RX ORDER — CHOLECALCIFEROL (VITAMIN D3) 25 MCG
1000 TABLET ORAL DAILY
COMMUNITY
End: 2023-12-01 | Stop reason: ALTCHOICE

## 2023-11-20 RX ORDER — AMLODIPINE BESYLATE 10 MG/1
10 TABLET ORAL DAILY
Qty: 90 TABLET | Refills: 0 | Status: SHIPPED | OUTPATIENT
Start: 2023-11-20 | End: 2024-02-26 | Stop reason: SDUPTHER

## 2023-11-20 ASSESSMENT — ENCOUNTER SYMPTOMS: HYPERTENSION: 1

## 2023-11-20 NOTE — PROGRESS NOTES
"Subjective   Patient ID: Lindsey Herrera is a 63 y.o. female who presents for Med Management, Hypertension, and Diabetes Mellitus.  Hypertension    routine follow up    Hiatal hernia diet discussed patient without significant discomfort at this time. Follow  Using otc prn     Hypertension on therapy no side effects  Follow 120's / high 80-90's at home     Hypercholesterolemia patient stopped therapy \"felt she didn't need it\"  Cardio will recheck in October  Labs and cardio follow up cancelled     CAD by elevated CT cardiac score patient on aspirin.  Cardiology following.     DM A1c 7.6  7/23 patient still refuses medication risk / benefits reviewed.  She will recheck her A1c before her next appointment with cardio  FBS not checked either  Aware of risks including but not limited to  MI, stroke, blindness, renal failure and death  Endo consult     Dense breast tissue on mammogram patient not interested in MRI of the breast.     Hypercalcemia improved continue to follow     Abnormal AST and ALT improved continue to follow     Diet and exercise reviewed     Follow up labs and  cardio     GYN, colonoscopy, mammogram and dexa  Pt declined at present    Review of Systems   All other systems reviewed and are negative.      Objective   /86   Pulse 109   Wt 137 kg (303 lb)   SpO2 95%   BMI 47.46 kg/m²   Lab Results   Component Value Date    WBC 10.9 03/01/2023    HGB 15.1 03/01/2023    HCT 46.0 03/01/2023     03/01/2023    CHOL 233 (H) 09/03/2022    TRIG 231 (H) 09/03/2022    HDL 49.0 09/03/2022    ALT 53 (H) 03/01/2023    AST 44 (H) 03/01/2023     (L) 03/01/2023    K 4.2 03/01/2023     03/01/2023    CREATININE 0.78 03/01/2023    BUN 22 03/01/2023    CO2 22 03/01/2023    TSH 3.80 07/08/2023    HGBA1C 7.6 (A) 07/08/2023           Physical Exam  Vitals reviewed.   Constitutional:       Appearance: Normal appearance. She is obese.   HENT:      Head: Normocephalic and atraumatic.      Mouth/Throat:     "  Pharynx: No posterior oropharyngeal erythema.   Eyes:      General: No scleral icterus.     Conjunctiva/sclera: Conjunctivae normal.      Pupils: Pupils are equal, round, and reactive to light.   Cardiovascular:      Rate and Rhythm: Normal rate and regular rhythm.      Heart sounds: Normal heart sounds.   Pulmonary:      Effort: No respiratory distress.      Breath sounds: No wheezing.   Abdominal:      General: Abdomen is flat. Bowel sounds are normal. There is no distension.      Palpations: Abdomen is soft. There is no mass.      Tenderness: There is no abdominal tenderness. There is no rebound.   Musculoskeletal:         General: Normal range of motion.      Cervical back: Normal range of motion and neck supple.   Skin:     General: Skin is warm and dry.   Neurological:      General: No focal deficit present.      Mental Status: She is alert and oriented to person, place, and time. Mental status is at baseline.   Psychiatric:         Mood and Affect: Mood normal.         Behavior: Behavior normal.         Thought Content: Thought content normal.         Judgment: Judgment normal.         Problem List Items Addressed This Visit             ICD-10-CM       Endocrine/Metabolic    DM (diabetes mellitus), type 2 (CMS/[facility]) - Primary E11.9    Relevant Orders    Referral to Endocrinology    Class 3 severe obesity due to excess calories with serious comorbidity and body mass index (BMI) of 45.0 to 49.9 in adult (CMS/[facility]) E66.01, Z68.42       Gastrointestinal and Abdominal    Hiatal hernia K44.9     Other Visit Diagnoses         Codes    Hypercholesteremia     E78.00    Hypertension, unspecified type     I10    Relevant Medications    amLODIPine (Norvasc) 10 mg tablet    losartan (Cozaar) 50 mg tablet    Coronary artery disease involving native coronary artery of native heart without angina pectoris     I25.10    Relevant Medications    amLODIPine (Norvasc) 10 mg tablet          Assessment/Plan     Hiatal hernia diet  "discussed patient without significant discomfort at this time. Follow  Using otc prn     Hypertension on therapy no side effects  Follow 120's / high 80-90's at home     Hypercholesterolemia patient stopped therapy \"felt she didn't need it\"  Cardio will recheck in October  Labs and cardio follow up cancelled by pt / not rescheduled     CAD by elevated CT cardiac score patient on aspirin.    Cardiology following but pt cancelled follow up     DM A1c 7.6  7/23 patient still refuses medication risk / benefits reviewed.  She will recheck her A1c before her next appointment with cardio  FBS not checked either  Aware of risks including but not limited to  MI, stroke, blindness, renal failure and death  Endo consult     Dense breast tissue on mammogram patient not interested in MRI of the breast.     Hypercalcemia improved continue to follow     Abnormal AST and ALT improved continue to follow     Diet and exercise reviewed     Follow up labs and  cardio     GYN, colonoscopy, mammogram and dexa  Pt declined at present    Immunizations rev'd    pt declined  Risks rev'd / pt understood    Non compliance discussed    Mammogram 11-22  Dexa none  Colonoscopy 17  was to recheck 3-5 yrs / past due  CT chest lung cancer screening n/a  GYN past due  immunizations rev'd  BMI 47.4    Follow up 3 months  "

## 2023-12-01 ENCOUNTER — OFFICE VISIT (OUTPATIENT)
Dept: PAIN MEDICINE | Facility: HOSPITAL | Age: 63
End: 2023-12-01
Payer: COMMERCIAL

## 2023-12-01 ENCOUNTER — PHARMACY VISIT (OUTPATIENT)
Dept: PHARMACY | Facility: CLINIC | Age: 63
End: 2023-12-01
Payer: COMMERCIAL

## 2023-12-01 VITALS
TEMPERATURE: 98.4 F | BODY MASS INDEX: 45.99 KG/M2 | SYSTOLIC BLOOD PRESSURE: 144 MMHG | HEART RATE: 83 BPM | HEIGHT: 67 IN | DIASTOLIC BLOOD PRESSURE: 96 MMHG | WEIGHT: 293 LBS

## 2023-12-01 DIAGNOSIS — M48.062 LUMBAR STENOSIS WITH NEUROGENIC CLAUDICATION: ICD-10-CM

## 2023-12-01 DIAGNOSIS — Z79.899 MEDICATION MANAGEMENT: ICD-10-CM

## 2023-12-01 DIAGNOSIS — M47.817 FACET ARTHROPATHY, LUMBOSACRAL: ICD-10-CM

## 2023-12-01 DIAGNOSIS — M54.16 LUMBAR RADICULOPATHY, CHRONIC: Primary | ICD-10-CM

## 2023-12-01 LAB
AMPHETAMINES UR QL SCN: NORMAL
BARBITURATES UR QL SCN: NORMAL
BENZODIAZ UR QL SCN: NORMAL
BZE UR QL SCN: NORMAL
CANNABINOIDS UR QL SCN: NORMAL
FENTANYL+NORFENTANYL UR QL SCN: NORMAL
OPIATES UR QL SCN: NORMAL
OXYCODONE+OXYMORPHONE UR QL SCN: NORMAL
PCP UR QL SCN: NORMAL

## 2023-12-01 PROCEDURE — 3051F HG A1C>EQUAL 7.0%<8.0%: CPT | Performed by: NURSE PRACTITIONER

## 2023-12-01 PROCEDURE — 99213 OFFICE O/P EST LOW 20 MIN: CPT | Mod: ZK | Performed by: NURSE PRACTITIONER

## 2023-12-01 PROCEDURE — 99213 OFFICE O/P EST LOW 20 MIN: CPT | Performed by: NURSE PRACTITIONER

## 2023-12-01 PROCEDURE — 3008F BODY MASS INDEX DOCD: CPT | Performed by: NURSE PRACTITIONER

## 2023-12-01 PROCEDURE — 4010F ACE/ARB THERAPY RXD/TAKEN: CPT | Performed by: NURSE PRACTITIONER

## 2023-12-01 PROCEDURE — 3077F SYST BP >= 140 MM HG: CPT | Performed by: NURSE PRACTITIONER

## 2023-12-01 PROCEDURE — 80307 DRUG TEST PRSMV CHEM ANLYZR: CPT | Performed by: NURSE PRACTITIONER

## 2023-12-01 PROCEDURE — 3080F DIAST BP >= 90 MM HG: CPT | Performed by: NURSE PRACTITIONER

## 2023-12-01 PROCEDURE — 1036F TOBACCO NON-USER: CPT | Performed by: NURSE PRACTITIONER

## 2023-12-01 PROCEDURE — RXMED WILLOW AMBULATORY MEDICATION CHARGE

## 2023-12-01 RX ORDER — GABAPENTIN 100 MG/1
100 CAPSULE ORAL NIGHTLY
Qty: 30 CAPSULE | Refills: 2 | Status: SHIPPED | OUTPATIENT
Start: 2023-12-01 | End: 2024-02-16 | Stop reason: SDUPTHER

## 2023-12-01 ASSESSMENT — ENCOUNTER SYMPTOMS
ARTHRALGIAS: 1
ENDOCRINE NEGATIVE: 1
NECK STIFFNESS: 0
PSYCHIATRIC NEGATIVE: 1
RESPIRATORY NEGATIVE: 1
BACK PAIN: 1
NEUROLOGICAL NEGATIVE: 1
CONSTITUTIONAL NEGATIVE: 1
ALLERGIC/IMMUNOLOGIC NEGATIVE: 1
CARDIOVASCULAR NEGATIVE: 1
JOINT SWELLING: 0
MYALGIAS: 1
GASTROINTESTINAL NEGATIVE: 1
EYES NEGATIVE: 1
NECK PAIN: 0

## 2023-12-01 ASSESSMENT — PAIN SCALES - GENERAL: PAINLEVEL: 7

## 2023-12-01 NOTE — PROGRESS NOTES
I have personally reviewed the [facility] report for Lindsey Herrera   . I have considered the risks of abuse, dependence, addiction and diversion.   Is the patient prescribed a combination of a benzodiazepine and opioid? No  Patient tolerating without AE. Benefit outweighs the risk. Discussed risks/benefits with patient. All questions answered. States understanding.     Date of the last Controlled Substance Agreement: 12/1/2023    Last urine drug screening date/ordered today: 12/01/23  Results of last screen: Results as expected.     OPIOID   What is the patient’s goal of therapy? PAIN CONTROL.  Is this being achieved with current treatment? Yes  Attestation statement: I feel that it is clinically indicated to continue this current medication regimen after consideration of alternative therapies, and other non-opioid treatments.     Opioid Risk Screening:   THE OPIOID RISK TOOL (ORT)                               Female                     Male    Alcohol                            [1] =  0                        [3] =   Illegal Drugs                           [2] =   0                        [3]  =     1. Family History of Substance Abuse Prescription Drugs                           [4]=   0                        [4]  =   Alcohol                           [3] =  0                        [3]   =  Illicit Drugs                           [4]= 0                          [4]   =    2. Personal History of Substance Abuse Prescription Drugs                          [5]=    0                        [5]   =    3. Age (If between 16 to 45)                          [1]=     0                      [1]   =    4. History of Preadolescent Sexual Abuse                         [3]=    0                        [0]   =    ADD, OCD, Bipolar, Schizophrenia                         [2]=   0                         [2]   =    5. Psychological Disease Depression                        [1]=    0                         [1]   =    TOTAL Score =  0      Last opioid risk screening date/ordered today: 12/1/2023  Patient's total score is 0    Reference :  Low Score = 0 to 3  Moderate Score = 4 to 7  High Score = =8       Pain Scale Screening:   Pain Assessment and Documentation Tool (PADT)   Date of Assessment: 12/1/2023  Analgesia:   Patient reports her pain level on average during the past week is 7on a 0 - 10 scale.   Patient reports that her pain level at its worst during the past week was 7 on a 10-10 scale.   50% of pain has been relieved during the past week per patient   Patient states that the amount of pain relief she is now obtaining from her current pain reliever(s) is enough to make a real difference in her life.     Activities of Daily Living:   Physical functioning: better  Family relationships: unchanged  Social relationships: unchanged  Mood: unchanged  Sleep patterns: unchanged  Overall functioning: unchanged  Adverse Events: NoLindsey is not experiencing side effects from current pain reliever.  Patients overall severity of side effect:none  Specific Analgesic Plan: Continue present regimen.

## 2023-12-01 NOTE — PATIENT INSTRUCTIONS
Continue taking tizanidine as needed for muscle pain relief.  Do not take sedating medications together.    Continue taking gabapentin at 100 mg at bedtime.  Do not take sedating medications together.    Continue taking naproxen or Aleve as needed for pain relief.  May also take Tylenol as needed.    You are scheduled for lumbar epidural block #2 at L5-S1 on 12/19/2023 in [place] with Dr. Hernandez.  No naproxen or Aleve on this day.  The office will call you for further instructions.  You will receive Cipro before the procedure due to your history of right total hip.  I will then see you for your postprocedure follow-up in 6 to 8 weeks.

## 2023-12-01 NOTE — H&P (VIEW-ONLY)
Subjective   Patient ID: Lindsey Herrera is a 63 y.o. female who presents for Back Pain (Post procedure follow up).    Lindsey is a pleasant 63-year-old  female who is here for postprocedure follow-up.  Patient is ambulatory and arrives by herself.    Patient had lumbar epidural block #1 at L5-S1 on 10/24/2023.  The injection has improved her back pain, leg pain and muscle spasm.  It provided 90% relief.  It lasted for 3 weeks.  The injection has improved her pain, the need for pain medication, ability to participate in her daily activities, and ability to enjoy social activities.  Patient reports that the pain is coming back and that the injection slowly wearing off.    Patient continues to have lower back pain that goes down to her legs with the left worse than the right.  She rates her pain a 7 out of 10.  Pain comes and goes or it can be constant depending on her level of activities.  She describes her pain as sharp, shooting and spastic.  She has numbness, stinging sensation to the back and side of her legs with the left worse than the right.  Back pain and leg pain is aggravated with prolonged standing and walking.  She reports she was doing more activities getting ready for Wendy.  She denies leg weakness or change in balance.  She denies bowel or bladder incontinence.  She denies recent falls, injuries, or ER visits.  There has been no change since she was last seen.    Patient takes tizanidine as needed for muscle pain relief.  She takes gabapentin 200 mg at bedtime.  She reports that one of this medication is making her lightheaded.  She does not know if it is the gabapentin or the tizanidine.  She takes Tylenol and Aleve for pain relief.  She continues to do the home stretching exercise with no relief to her leg pain.    I discussed the plan of care including pharmacologic and joint interventional procedure.  I discussed repeating lumbar epidural block and she is in agreement.  This is a  therapeutic procedure done under fluoroscopy.  Questions were answered during this encounter.    -------------  10/24/2023: Lumbar epidural block #1 at L5-S1  ----------            Past Medical History  She has a past medical history of Essential (primary) hypertension (09/20/2022), Gastro-esophageal reflux disease without esophagitis (03/02/2022), Hyperglycemia, unspecified (06/30/2021), Hyperlipidemia, unspecified (09/20/2022), Other muscle spasm (12/01/2022), Presence of right artificial hip joint (06/30/2022), Proteinuria, unspecified (09/20/2022), Restless legs syndrome (12/01/2022), and Vitamin D deficiency, unspecified (09/01/2022).    Surgical History  Past Surgical History:   Procedure Laterality Date    OTHER SURGICAL HISTORY  04/04/2019    Cholecystectomy    OTHER SURGICAL HISTORY  04/04/2019    Tubal ligation    OTHER SURGICAL HISTORY  04/04/2019    Hip replacement    OTHER SURGICAL HISTORY  04/04/2019    Tonsillectomy    OTHER SURGICAL HISTORY  07/01/2021    Complete colonoscopy    OTHER SURGICAL HISTORY  07/01/2021    Complete colonoscopy    OTHER SURGICAL HISTORY  07/01/2021    Complete colonoscopy        Social History  She reports that she has never smoked. She has never been exposed to tobacco smoke. She has never used smokeless tobacco. She reports current alcohol use. She reports that she does not use drugs.    Family History  Family History   Problem Relation Name Age of Onset    Diabetes Mother      COPD Mother      Thyroid disease Mother          Allergies  Metoprolol, Ace inhibitors, Atorvastatin, Atorvastatin calcium, Metformin, Pseudoephedrine hcl, Triamterene-hydrochlorothiazid, and Erythromycin      Current Outpatient Medications:     acetaminophen (Tylenol) 500 mg tablet, Take by mouth., Disp: , Rfl:     amLODIPine (Norvasc) 10 mg tablet, Take 1 tablet (10 mg) by mouth once daily., Disp: 90 tablet, Rfl: 0    losartan (Cozaar) 50 mg tablet, TAKE 1 TABLET BY MOUTH DAILY, Disp: 90 tablet,  Rfl: 0    naproxen (Naprosyn) 250 mg tablet, Take 1 tablet (250 mg) by mouth 2 times a day with meals., Disp: , Rfl:     OneTouch Delica Plus Lancet 33 gauge misc, , Disp: , Rfl:     OneTouch Verio Reflect Meter misc, , Disp: , Rfl:     OneTouch Verio test strips strip, , Disp: , Rfl:     rOPINIRole (Requip) 1 mg tablet, Take 1 tablet (1 mg) by mouth once daily at bedtime., Disp: 90 tablet, Rfl: 0    gabapentin (Neurontin) 100 mg capsule, Take 1 capsule (100 mg) by mouth once daily at bedtime., Disp: 30 capsule, Rfl: 2     Review of Systems   Constitutional: Negative.    HENT: Negative.     Eyes: Negative.    Respiratory: Negative.     Cardiovascular: Negative.    Gastrointestinal: Negative.    Endocrine: Negative.    Genitourinary: Negative.    Musculoskeletal:  Positive for arthralgias, back pain and myalgias. Negative for gait problem, joint swelling, neck pain and neck stiffness.   Skin: Negative.    Allergic/Immunologic: Negative.    Neurological: Negative.    Psychiatric/Behavioral: Negative.          Physical Exam  Vitals and nursing note reviewed.   HENT:      Head: Normocephalic.      Nose: Nose normal.   Eyes:      Extraocular Movements: Extraocular movements intact.      Conjunctiva/sclera: Conjunctivae normal.      Pupils: Pupils are equal, round, and reactive to light.   Cardiovascular:      Rate and Rhythm: Normal rate and regular rhythm.   Pulmonary:      Effort: Pulmonary effort is normal.      Breath sounds: Normal breath sounds.   Musculoskeletal:         General: Tenderness present. No swelling, deformity or signs of injury.      Cervical back: No rigidity or tenderness.      Lumbar back: Spasms and tenderness present.      Right lower leg: No edema.      Left lower leg: No edema.      Comments: Positive leg raise eliciting back pain with the right worse than the left.  Positive for paraspinal tenderness at the lumbar region bilaterally at L4-L5, L5-S1 with rotation.  With radicular symptoms that  follows L5-S1 distribution.  BUE 4-5/5, BLE 4/5.   Skin:     General: Skin is warm and dry.   Neurological:      General: No focal deficit present.      Mental Status: She is alert and oriented to person, place, and time.   Psychiatric:         Mood and Affect: Mood normal.         Behavior: Behavior normal.          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     COMPARISON:  None.     ACCESSION NUMBER(S):  [id number]     ORDERING CLINICIAN:  MAKENZIE DE     TECHNIQUE:  The lumbar spine was studied in the sagital, axial and coronal planes  utiliing T1 and T2 weighted images.     FINDINGS:  The marrow signal and vertebral body height are normal. The conus and  sacrum are normal.  Images at each interspace reveal the following:  L1/L2  There is normal alignment and vertebral body height. The disc space  is normal. There is no evidence of canal or foraminal narrowing.  There is no evidence of bulging or herniated disc.  L2/L3  There is normal alignment and vertebral body height. The disc space  is normal. There is no evidence of canal or foraminal narrowing.  There is no evidence of bulging or herniated disc.  L3/L4  Mild bilateral facet hypertrophy without canal or foraminal narrowing  L4/L5  Trace spondylolisthesis and pseudo bulging intervertebral disc.  Bilateral facet hypertrophy. Flattening of the thecal sac which  measures approximately 7  mm in AP dimension in the midline. Mild  bilateral foraminal narrowing.  L5/S1  Bilateral facet hypertrophy with mild foraminal narrowing. No  measurable canal stenosis.        IMPRESSION:  * Mild lumbar spondylosis as described         Assessment/Plan   Problem List Items Addressed This Visit             ICD-10-CM    Facet arthropathy, lumbosacral M47.817    Relevant Medications    gabapentin (Neurontin) 100 mg capsule    Lumbar radiculopathy, chronic - Primary M54.16    Relevant Medications    gabapentin (Neurontin) 100 mg capsule    Other Relevant Orders    Epidural Steroid Injection    Lumbar stenosis with neurogenic claudication M48.062    Relevant Medications    gabapentin (Neurontin) 100 mg capsule    Other Relevant Orders    Epidural Steroid Injection     Other Visit Diagnoses         Codes    Medication management     Z79.899    Relevant Orders    Drug Screen, Urine With Reflex to Confirmation                   1. Lumbar radiculopathy, chronic  - gabapentin (Neurontin) 100 mg capsule; Take 1 capsule (100 mg) by mouth once daily at bedtime.  Dispense: 30 capsule; Refill: 2  - Epidural Steroid Injection; Future    2. Lumbar stenosis with neurogenic claudication  - gabapentin (Neurontin) 100 mg capsule; Take 1 capsule (100 mg) by mouth once daily at bedtime.  Dispense: 30 capsule; Refill: 2  - Epidural Steroid Injection; Future    3. Facet arthropathy, lumbosacral  - gabapentin (Neurontin) 100 mg capsule; Take 1 capsule (100 mg) by mouth once daily at bedtime.  Dispense: 30 capsule; Refill: 2    4. Medication management  - Drug Screen, Urine With Reflex to Confirmation       Plan/Follow-up Instructions:     Continue taking tizanidine as needed for muscle pain relief.  Do not take sedating medications together.    Continue taking gabapentin at 100 mg at bedtime.  Do not take sedating medications together.    Continue taking naproxen or Aleve as needed for pain relief.  May also take Tylenol as needed.    You  are scheduled for lumbar epidural block #2 at L5-S1 on 12/19/2023 in [place] with Dr. Hernandez.  No naproxen or Aleve on this day.  The office will call you for further instructions.  You will receive Cipro before the procedure due to your history of right total hip.  I will then see you for your postprocedure follow-up in 6 to 8 weeks.      Disclaimer: This note was created using voice recognition software. It was not corrected for typographical or grammatical errors, inadvertent word insertion, or any unintended errors. Please feel free to contact me for clarification.        Jocelyn Quiñones DNP, APRN, FNP-C   [facility]  Office #: [phone]  Fax # [phone]

## 2023-12-01 NOTE — PROGRESS NOTES
Subjective   Patient ID: Lindsey Herrera is a 63 y.o. female who presents for Back Pain (Post procedure follow up).    Lindsey is a pleasant 63-year-old  female who is here for postprocedure follow-up.  Patient is ambulatory and arrives by herself.    Patient had lumbar epidural block #1 at L5-S1 on 10/24/2023.  The injection has improved her back pain, leg pain and muscle spasm.  It provided 90% relief.  It lasted for 3 weeks.  The injection has improved her pain, the need for pain medication, ability to participate in her daily activities, and ability to enjoy social activities.  Patient reports that the pain is coming back and that the injection slowly wearing off.    Patient continues to have lower back pain that goes down to her legs with the left worse than the right.  She rates her pain a 7 out of 10.  Pain comes and goes or it can be constant depending on her level of activities.  She describes her pain as sharp, shooting and spastic.  She has numbness, stinging sensation to the back and side of her legs with the left worse than the right.  Back pain and leg pain is aggravated with prolonged standing and walking.  She reports she was doing more activities getting ready for Wendy.  She denies leg weakness or change in balance.  She denies bowel or bladder incontinence.  She denies recent falls, injuries, or ER visits.  There has been no change since she was last seen.    Patient takes tizanidine as needed for muscle pain relief.  She takes gabapentin 200 mg at bedtime.  She reports that one of this medication is making her lightheaded.  She does not know if it is the gabapentin or the tizanidine.  She takes Tylenol and Aleve for pain relief.  She continues to do the home stretching exercise with no relief to her leg pain.    I discussed the plan of care including pharmacologic and joint interventional procedure.  I discussed repeating lumbar epidural block and she is in agreement.  This is a  therapeutic procedure done under fluoroscopy.  Questions were answered during this encounter.    -------------  10/24/2023: Lumbar epidural block #1 at L5-S1  ----------            Past Medical History  She has a past medical history of Essential (primary) hypertension (09/20/2022), Gastro-esophageal reflux disease without esophagitis (03/02/2022), Hyperglycemia, unspecified (06/30/2021), Hyperlipidemia, unspecified (09/20/2022), Other muscle spasm (12/01/2022), Presence of right artificial hip joint (06/30/2022), Proteinuria, unspecified (09/20/2022), Restless legs syndrome (12/01/2022), and Vitamin D deficiency, unspecified (09/01/2022).    Surgical History  Past Surgical History:   Procedure Laterality Date    OTHER SURGICAL HISTORY  04/04/2019    Cholecystectomy    OTHER SURGICAL HISTORY  04/04/2019    Tubal ligation    OTHER SURGICAL HISTORY  04/04/2019    Hip replacement    OTHER SURGICAL HISTORY  04/04/2019    Tonsillectomy    OTHER SURGICAL HISTORY  07/01/2021    Complete colonoscopy    OTHER SURGICAL HISTORY  07/01/2021    Complete colonoscopy    OTHER SURGICAL HISTORY  07/01/2021    Complete colonoscopy        Social History  She reports that she has never smoked. She has never been exposed to tobacco smoke. She has never used smokeless tobacco. She reports current alcohol use. She reports that she does not use drugs.    Family History  Family History   Problem Relation Name Age of Onset    Diabetes Mother      COPD Mother      Thyroid disease Mother          Allergies  Metoprolol, Ace inhibitors, Atorvastatin, Atorvastatin calcium, Metformin, Pseudoephedrine hcl, Triamterene-hydrochlorothiazid, and Erythromycin      Current Outpatient Medications:     acetaminophen (Tylenol) 500 mg tablet, Take by mouth., Disp: , Rfl:     amLODIPine (Norvasc) 10 mg tablet, Take 1 tablet (10 mg) by mouth once daily., Disp: 90 tablet, Rfl: 0    losartan (Cozaar) 50 mg tablet, TAKE 1 TABLET BY MOUTH DAILY, Disp: 90 tablet,  Rfl: 0    naproxen (Naprosyn) 250 mg tablet, Take 1 tablet (250 mg) by mouth 2 times a day with meals., Disp: , Rfl:     OneTouch Delica Plus Lancet 33 gauge misc, , Disp: , Rfl:     OneTouch Verio Reflect Meter misc, , Disp: , Rfl:     OneTouch Verio test strips strip, , Disp: , Rfl:     rOPINIRole (Requip) 1 mg tablet, Take 1 tablet (1 mg) by mouth once daily at bedtime., Disp: 90 tablet, Rfl: 0    gabapentin (Neurontin) 100 mg capsule, Take 1 capsule (100 mg) by mouth once daily at bedtime., Disp: 30 capsule, Rfl: 2     Review of Systems   Constitutional: Negative.    HENT: Negative.     Eyes: Negative.    Respiratory: Negative.     Cardiovascular: Negative.    Gastrointestinal: Negative.    Endocrine: Negative.    Genitourinary: Negative.    Musculoskeletal:  Positive for arthralgias, back pain and myalgias. Negative for gait problem, joint swelling, neck pain and neck stiffness.   Skin: Negative.    Allergic/Immunologic: Negative.    Neurological: Negative.    Psychiatric/Behavioral: Negative.          Physical Exam  Vitals and nursing note reviewed.   HENT:      Head: Normocephalic.      Nose: Nose normal.   Eyes:      Extraocular Movements: Extraocular movements intact.      Conjunctiva/sclera: Conjunctivae normal.      Pupils: Pupils are equal, round, and reactive to light.   Cardiovascular:      Rate and Rhythm: Normal rate and regular rhythm.   Pulmonary:      Effort: Pulmonary effort is normal.      Breath sounds: Normal breath sounds.   Musculoskeletal:         General: Tenderness present. No swelling, deformity or signs of injury.      Cervical back: No rigidity or tenderness.      Lumbar back: Spasms and tenderness present.      Right lower leg: No edema.      Left lower leg: No edema.      Comments: Positive leg raise eliciting back pain with the right worse than the left.  Positive for paraspinal tenderness at the lumbar region bilaterally at L4-L5, L5-S1 with rotation.  With radicular symptoms that  follows L5-S1 distribution.  BUE 4-5/5, BLE 4/5.   Skin:     General: Skin is warm and dry.   Neurological:      General: No focal deficit present.      Mental Status: She is alert and oriented to person, place, and time.   Psychiatric:         Mood and Affect: Mood normal.         Behavior: Behavior normal.          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     COMPARISON:  None.     ACCESSION NUMBER(S):  [id number]     ORDERING CLINICIAN:  MAKENZIE DE     TECHNIQUE:  The lumbar spine was studied in the sagital, axial and coronal planes  utiliing T1 and T2 weighted images.     FINDINGS:  The marrow signal and vertebral body height are normal. The conus and  sacrum are normal.  Images at each interspace reveal the following:  L1/L2  There is normal alignment and vertebral body height. The disc space  is normal. There is no evidence of canal or foraminal narrowing.  There is no evidence of bulging or herniated disc.  L2/L3  There is normal alignment and vertebral body height. The disc space  is normal. There is no evidence of canal or foraminal narrowing.  There is no evidence of bulging or herniated disc.  L3/L4  Mild bilateral facet hypertrophy without canal or foraminal narrowing  L4/L5  Trace spondylolisthesis and pseudo bulging intervertebral disc.  Bilateral facet hypertrophy. Flattening of the thecal sac which  measures approximately 7  mm in AP dimension in the midline. Mild  bilateral foraminal narrowing.  L5/S1  Bilateral facet hypertrophy with mild foraminal narrowing. No  measurable canal stenosis.        IMPRESSION:  * Mild lumbar spondylosis as described         Assessment/Plan   Problem List Items Addressed This Visit             ICD-10-CM    Facet arthropathy, lumbosacral M47.817    Relevant Medications    gabapentin (Neurontin) 100 mg capsule    Lumbar radiculopathy, chronic - Primary M54.16    Relevant Medications    gabapentin (Neurontin) 100 mg capsule    Other Relevant Orders    Epidural Steroid Injection    Lumbar stenosis with neurogenic claudication M48.062    Relevant Medications    gabapentin (Neurontin) 100 mg capsule    Other Relevant Orders    Epidural Steroid Injection     Other Visit Diagnoses         Codes    Medication management     Z79.899    Relevant Orders    Drug Screen, Urine With Reflex to Confirmation                   1. Lumbar radiculopathy, chronic  - gabapentin (Neurontin) 100 mg capsule; Take 1 capsule (100 mg) by mouth once daily at bedtime.  Dispense: 30 capsule; Refill: 2  - Epidural Steroid Injection; Future    2. Lumbar stenosis with neurogenic claudication  - gabapentin (Neurontin) 100 mg capsule; Take 1 capsule (100 mg) by mouth once daily at bedtime.  Dispense: 30 capsule; Refill: 2  - Epidural Steroid Injection; Future    3. Facet arthropathy, lumbosacral  - gabapentin (Neurontin) 100 mg capsule; Take 1 capsule (100 mg) by mouth once daily at bedtime.  Dispense: 30 capsule; Refill: 2    4. Medication management  - Drug Screen, Urine With Reflex to Confirmation       Plan/Follow-up Instructions:     Continue taking tizanidine as needed for muscle pain relief.  Do not take sedating medications together.    Continue taking gabapentin at 100 mg at bedtime.  Do not take sedating medications together.    Continue taking naproxen or Aleve as needed for pain relief.  May also take Tylenol as needed.    You  are scheduled for lumbar epidural block #2 at L5-S1 on 12/19/2023 in [place] with Dr. Hernandez.  No naproxen or Aleve on this day.  The office will call you for further instructions.  You will receive Cipro before the procedure due to your history of right total hip.  I will then see you for your postprocedure follow-up in 6 to 8 weeks.      Disclaimer: This note was created using voice recognition software. It was not corrected for typographical or grammatical errors, inadvertent word insertion, or any unintended errors. Please feel free to contact me for clarification.        Jocelyn Quiñones DNP, APRN, FNP-C   [facility] [facility]  Office #: [phone]  Fax # [phone]

## 2023-12-07 ENCOUNTER — TELEPHONE (OUTPATIENT)
Dept: OPERATING ROOM | Facility: HOSPITAL | Age: 63
End: 2023-12-07
Payer: COMMERCIAL

## 2023-12-19 ENCOUNTER — HOSPITAL ENCOUNTER (OUTPATIENT)
Dept: RADIOLOGY | Facility: HOSPITAL | Age: 63
Discharge: HOME | End: 2023-12-19
Payer: COMMERCIAL

## 2023-12-19 ENCOUNTER — HOSPITAL ENCOUNTER (OUTPATIENT)
Dept: PAIN MEDICINE | Facility: HOSPITAL | Age: 63
Discharge: HOME | End: 2023-12-19
Payer: COMMERCIAL

## 2023-12-19 VITALS
OXYGEN SATURATION: 97 % | BODY MASS INDEX: 45.99 KG/M2 | RESPIRATION RATE: 18 BRPM | SYSTOLIC BLOOD PRESSURE: 163 MMHG | HEIGHT: 67 IN | TEMPERATURE: 97.3 F | WEIGHT: 293 LBS | DIASTOLIC BLOOD PRESSURE: 104 MMHG | HEART RATE: 94 BPM

## 2023-12-19 DIAGNOSIS — M48.062 LUMBAR STENOSIS WITH NEUROGENIC CLAUDICATION: ICD-10-CM

## 2023-12-19 DIAGNOSIS — M47.817 FACET ARTHROPATHY, LUMBOSACRAL: ICD-10-CM

## 2023-12-19 DIAGNOSIS — M54.16 LUMBAR RADICULOPATHY, CHRONIC: ICD-10-CM

## 2023-12-19 PROCEDURE — 7100000009 HC PHASE TWO TIME - INITIAL BASE CHARGE: Performed by: ANESTHESIOLOGY

## 2023-12-19 PROCEDURE — 77003 FLUOROGUIDE FOR SPINE INJECT: CPT

## 2023-12-19 PROCEDURE — 62323 NJX INTERLAMINAR LMBR/SAC: CPT | Performed by: ANESTHESIOLOGY

## 2023-12-19 PROCEDURE — 94760 N-INVAS EAR/PLS OXIMETRY 1: CPT

## 2023-12-19 PROCEDURE — 2500000001 HC RX 250 WO HCPCS SELF ADMINISTERED DRUGS (ALT 637 FOR MEDICARE OP)

## 2023-12-19 PROCEDURE — 7100000010 HC PHASE TWO TIME - EACH INCREMENTAL 1 MINUTE: Performed by: ANESTHESIOLOGY

## 2023-12-19 RX ORDER — CIPROFLOXACIN 500 MG/1
TABLET ORAL
Status: COMPLETED
Start: 2023-12-19 | End: 2023-12-19

## 2023-12-19 RX ORDER — CIPROFLOXACIN 500 MG/1
500 TABLET ORAL ONCE
Status: DISCONTINUED | OUTPATIENT
Start: 2023-12-19 | End: 2023-12-20 | Stop reason: HOSPADM

## 2023-12-19 RX ADMIN — CIPROFLOXACIN 500 MG: 500 TABLET, FILM COATED ORAL at 13:44

## 2023-12-19 ASSESSMENT — PAIN - FUNCTIONAL ASSESSMENT
PAIN_FUNCTIONAL_ASSESSMENT: 0-10
PAIN_FUNCTIONAL_ASSESSMENT: 0-10

## 2023-12-19 ASSESSMENT — COLUMBIA-SUICIDE SEVERITY RATING SCALE - C-SSRS
2. HAVE YOU ACTUALLY HAD ANY THOUGHTS OF KILLING YOURSELF?: NO
1. IN THE PAST MONTH, HAVE YOU WISHED YOU WERE DEAD OR WISHED YOU COULD GO TO SLEEP AND NOT WAKE UP?: NO
6. HAVE YOU EVER DONE ANYTHING, STARTED TO DO ANYTHING, OR PREPARED TO DO ANYTHING TO END YOUR LIFE?: NO

## 2023-12-19 ASSESSMENT — PAIN SCALES - GENERAL
PAINLEVEL_OUTOF10: 10 - WORST POSSIBLE PAIN
PAINLEVEL_OUTOF10: 0 - NO PAIN

## 2023-12-19 NOTE — OP NOTE
Date: 2023  OR Location: GEN NON-OR PROCEDURES    Name: Lindsey Herrera, : 1960, Age: 63 y.o., MRN: [id number], Sex: female    Diagnosis   1. Lumbar stenosis with neurogenic claudication  Epidural Steroid Injection    Epidural Steroid Injection      2. Lumbar radiculopathy, chronic  Epidural Steroid Injection    Epidural Steroid Injection        Patient has no changes in health medical management or allergies since last visit.    Preop diagnosis: Lumbosacral radiculopathy  Postoperative diagnosis: The same  Complication: None   Anesthesia: Local  PROCEDURE L5-S1  Lumbar Interlaminar Epidural    Procedures    CLINICAL NOTE: The patient is a pleasant 63 y.o.  female with a significant history of bilateral lower back pain with claudication/radiculopathy.  Physical exam and radiological findings correlate with the pre-operative diagnoses.  Because of these findings we elected to proceed with epidural steroid injection at the above level L5-S1.      PROCEDURE: After sufficient consent was signed, the patient was brought to the Operating Room, and placed in a prone position with a pillow underneath the hip. The patient's lumbar spine was in a flexed position. The lumbar area was then prepped and draped in the usual fashion.     Under fluoroscopic guidance, the L5-S1 interspace was identified. The skin at the entry site was infiltrated with 1% Lidocaine using a size 25 gauge needle. A size Touhy needle 18G, 3.5 inch was introduced gradually until the inferior lamina of the interspace was encountered. The needle was gradually advanced until it entered into the ligament. At that time loss of resistance technique was used, and the needle advanced gradually until the negative pressure was noticed in the epidural space. The bevel of the needle was placed laterally, and 3 cc of Omnipaque 300 were injected. This revealed excellent distribution of the dye in the epidural space, and around the nerve root of the affected  side of the lumbar spine. At that time, a mixture of Kenalog 20 mg Lidocaine 30  mg and Normal Saline for a total volume of 10 cc were injected . The needle was flushed and removed.     The patient tolerated the procedure very well and was transferred to the Recovery Room in stable condition.  The patient had stable resolution of symptoms.  Patient to follow-up in the Pain Management Clinic as scheduled.

## 2023-12-19 NOTE — INTERVAL H&P NOTE
H&P reviewed. The patient was examined and there are no changes to the H&P.Patient denies any new c/o or concerns, denies any recent falls, denies sob, cp, n/v/d, difficulty with urination or bm's.

## 2023-12-19 NOTE — DISCHARGE INSTRUCTIONS
Discharge Instructions:  Keep band-aid on for the next 24 hours.  No showering/bathing for the next 24 hours. You may notice soreness or increased pain in the area of your injection, which may continue for the first 48 hours.  Avoid driving or operating any heavy machinery until the next day after the procedure.  You should resume any medications and your regular diet after the procedure. Some of the side affects you may experience from the steroids are: Insomnia (inability to sleep), Increased sweating, Headaches, Increased fluid retention (swelling of your extremities), Increased appetite, Face flushing, If you are a diabetic, your blood sugars may go up.  Closely monitor your diet.  Your blood sugar should return to normal in a few days.  Complications: If the discomfort persists, apply moist heat to the area.  Serious complications are very uncommon but may include bleeding, infection or nerve damage. If sever pain, fever, redness or swelling near the injection site, have someone take you to the nearest emergency room to be evaluated for procedure complications or infection. Expectations: Local anesthetics wear off in several hours but duration of relief varies from individual to individual.  If you have any questions, please call the office at [phone].  If this is an emergency, please go to the nearest emergency room.

## 2023-12-20 DIAGNOSIS — E11.69 TYPE 2 DIABETES MELLITUS WITH OTHER SPECIFIED COMPLICATION, WITHOUT LONG-TERM CURRENT USE OF INSULIN (MULTI): Primary | ICD-10-CM

## 2023-12-20 DIAGNOSIS — M62.838 MUSCLE SPASM: ICD-10-CM

## 2023-12-20 RX ORDER — TIZANIDINE HYDROCHLORIDE 2 MG/1
2 CAPSULE, GELATIN COATED ORAL 3 TIMES DAILY
Qty: 90 CAPSULE | Refills: 1 | Status: SHIPPED | OUTPATIENT
Start: 2023-12-20 | End: 2023-12-21 | Stop reason: SDUPTHER

## 2023-12-20 RX ORDER — GLIMEPIRIDE 2 MG/1
2 TABLET ORAL
Qty: 30 TABLET | Refills: 1 | Status: SHIPPED | OUTPATIENT
Start: 2023-12-20 | End: 2023-12-21 | Stop reason: SDUPTHER

## 2023-12-20 RX ORDER — TIZANIDINE HYDROCHLORIDE 2 MG/1
2 CAPSULE, GELATIN COATED ORAL 3 TIMES DAILY
Qty: 90 CAPSULE | Refills: 0 | Status: SHIPPED | OUTPATIENT
Start: 2023-12-20 | End: 2023-12-20 | Stop reason: SDUPTHER

## 2023-12-20 RX ORDER — TIZANIDINE HYDROCHLORIDE 2 MG/1
2 CAPSULE, GELATIN COATED ORAL 3 TIMES DAILY
COMMUNITY
End: 2023-12-20 | Stop reason: SDUPTHER

## 2023-12-20 ASSESSMENT — PAIN SCALES - GENERAL: PAINLEVEL_OUTOF10: 2

## 2023-12-21 DIAGNOSIS — E11.69 TYPE 2 DIABETES MELLITUS WITH OTHER SPECIFIED COMPLICATION, WITHOUT LONG-TERM CURRENT USE OF INSULIN (MULTI): ICD-10-CM

## 2023-12-21 DIAGNOSIS — M62.838 MUSCLE SPASM: ICD-10-CM

## 2023-12-21 RX ORDER — GLIMEPIRIDE 2 MG/1
2 TABLET ORAL
Qty: 30 TABLET | Refills: 1 | Status: SHIPPED | OUTPATIENT
Start: 2023-12-21 | End: 2024-02-21 | Stop reason: SDUPTHER

## 2023-12-21 RX ORDER — TIZANIDINE 2 MG/1
TABLET ORAL
Qty: 30 TABLET | Refills: 1 | Status: SHIPPED | OUTPATIENT
Start: 2023-12-21 | End: 2024-02-16 | Stop reason: SDUPTHER

## 2023-12-22 ENCOUNTER — PHARMACY VISIT (OUTPATIENT)
Dept: PHARMACY | Facility: CLINIC | Age: 63
End: 2023-12-22
Payer: COMMERCIAL

## 2023-12-22 PROCEDURE — RXMED WILLOW AMBULATORY MEDICATION CHARGE

## 2024-01-04 PROCEDURE — RXMED WILLOW AMBULATORY MEDICATION CHARGE

## 2024-01-10 ENCOUNTER — PHARMACY VISIT (OUTPATIENT)
Dept: PHARMACY | Facility: CLINIC | Age: 64
End: 2024-01-10
Payer: COMMERCIAL

## 2024-01-30 PROCEDURE — RXMED WILLOW AMBULATORY MEDICATION CHARGE

## 2024-02-02 PROCEDURE — RXMED WILLOW AMBULATORY MEDICATION CHARGE

## 2024-02-05 ENCOUNTER — PHARMACY VISIT (OUTPATIENT)
Dept: PHARMACY | Facility: CLINIC | Age: 64
End: 2024-02-05
Payer: COMMERCIAL

## 2024-02-16 ENCOUNTER — OFFICE VISIT (OUTPATIENT)
Dept: PAIN MEDICINE | Facility: HOSPITAL | Age: 64
End: 2024-02-16
Payer: COMMERCIAL

## 2024-02-16 VITALS
WEIGHT: 293 LBS | HEART RATE: 99 BPM | HEIGHT: 67 IN | TEMPERATURE: 98 F | SYSTOLIC BLOOD PRESSURE: 175 MMHG | BODY MASS INDEX: 45.99 KG/M2 | DIASTOLIC BLOOD PRESSURE: 109 MMHG

## 2024-02-16 DIAGNOSIS — M54.16 LUMBAR RADICULOPATHY, CHRONIC: ICD-10-CM

## 2024-02-16 DIAGNOSIS — M62.838 MUSCLE SPASM: ICD-10-CM

## 2024-02-16 DIAGNOSIS — M48.062 LUMBAR STENOSIS WITH NEUROGENIC CLAUDICATION: ICD-10-CM

## 2024-02-16 DIAGNOSIS — M47.817 FACET ARTHROPATHY, LUMBOSACRAL: ICD-10-CM

## 2024-02-16 DIAGNOSIS — M46.1 SACROILIITIS (CMS-HCC): Primary | ICD-10-CM

## 2024-02-16 PROCEDURE — 3008F BODY MASS INDEX DOCD: CPT | Performed by: NURSE PRACTITIONER

## 2024-02-16 PROCEDURE — 3077F SYST BP >= 140 MM HG: CPT | Performed by: NURSE PRACTITIONER

## 2024-02-16 PROCEDURE — 99214 OFFICE O/P EST MOD 30 MIN: CPT | Mod: ZK | Performed by: NURSE PRACTITIONER

## 2024-02-16 PROCEDURE — 4010F ACE/ARB THERAPY RXD/TAKEN: CPT | Performed by: NURSE PRACTITIONER

## 2024-02-16 PROCEDURE — 1036F TOBACCO NON-USER: CPT | Performed by: NURSE PRACTITIONER

## 2024-02-16 PROCEDURE — 99214 OFFICE O/P EST MOD 30 MIN: CPT | Performed by: NURSE PRACTITIONER

## 2024-02-16 PROCEDURE — 3080F DIAST BP >= 90 MM HG: CPT | Performed by: NURSE PRACTITIONER

## 2024-02-16 RX ORDER — GABAPENTIN 100 MG/1
200 CAPSULE ORAL NIGHTLY
Qty: 60 CAPSULE | Refills: 2 | Status: SHIPPED | OUTPATIENT
Start: 2024-02-16 | End: 2024-05-10

## 2024-02-16 RX ORDER — TIZANIDINE 2 MG/1
TABLET ORAL
Qty: 90 TABLET | Refills: 1 | Status: SHIPPED | OUTPATIENT
Start: 2024-02-16

## 2024-02-16 ASSESSMENT — ENCOUNTER SYMPTOMS
ENDOCRINE NEGATIVE: 1
JOINT SWELLING: 0
RESPIRATORY NEGATIVE: 1
GASTROINTESTINAL NEGATIVE: 1
CARDIOVASCULAR NEGATIVE: 1
PSYCHIATRIC NEGATIVE: 1
ARTHRALGIAS: 1
MYALGIAS: 1
ALLERGIC/IMMUNOLOGIC NEGATIVE: 1
NEUROLOGICAL NEGATIVE: 1
NECK STIFFNESS: 0
BACK PAIN: 1
CONSTITUTIONAL NEGATIVE: 1
NECK PAIN: 0
EYES NEGATIVE: 1

## 2024-02-16 ASSESSMENT — PAIN SCALES - GENERAL: PAINLEVEL: 10-WORST PAIN EVER

## 2024-02-16 NOTE — PROGRESS NOTES
I have personally reviewed the OAR[place] report for Lindsey Herrera   . I have considered the risks of abuse, dependence, addiction and diversion.   Is the patient prescribed a combination of a benzodiazepine and opioid? No  Patient tolerating without AE. Benefit outweighs the risk. Discussed risks/benefits with patient. All questions answered. States understanding.     Date of the last Controlled Substance Agreement: 12/1/2023       Last urine drug screening date/ordered today: 12/1/2023     Results of last screen: Results as expected.       Last opioid risk screening date/ordered today: 12/1/2023      Pain Scale Screening:   Pain Assessment and Documentation Tool (PADT)   Date of Assessment: 2/16/2024  Analgesia:   Patient reports her pain level on average during the past week is 10on a 0 - 10 scale.   Patient reports that her pain level at its worst during the past week was 10 on a 0 -10 scale.   80% of pain has been relieved during the past week per patient   Patient states that the amount of pain relief she is now obtaining from her current pain reliever(s) is enough to make a real difference in her life.   Query to clinician: Is the patient's pain relief clinically significant? no  Activities of Daily Living:   Physical functioning: unchanged  Family relationships: unchanged  Social relationships: unchanged  Mood: unchanged  Sleep patterns: unchanged  Overall functioning: unchanged  Adverse Events: No, Lindsey Herrera is not experiencing side effects from current pain reliever.  Patients overall severity of side effect:none  Specific Analgesic Plan: Continue present regimen.

## 2024-02-16 NOTE — PROGRESS NOTES
Subjective   Patient ID: Lindsey Herrera is a 63 y.o. female who presents for Follow-up (Post procedure follow up).    Lindsey is a pleasant 63-year-old  female who is here for a postprocedure follow-up.  Patient is ambulatory.  Gait is steady.  She arrives by herself.    Patient had lumbar epidural block #2 at L5-S1 on 12/19/2023.  The injection has improved her back pain.  It provided 80% relief.  She is still feeling better.  The injection has improved her pain and her ability to participate in her daily activities.    Patient continues to have chronic back pain more so at the left side that goes down to her hip.  She rates her pain as 10 out of 10.  However she does not appear to be at this level of pain.  Pain is constant or comes and goes depending on her activities.  She describes her pain as burning, sharp, shooting and stabbing kind of pain.  She has pain to her left hip buttock with sitting.  She reports it is worse when using the bathroom.  She has stinging sensation to the left hip area after she sits down.  She reports this has not been ongoing but not as bad.  It worsened since January.  She denies pain, numbness or tingling sensation to her legs.  She denies leg weakness or change in balance.  She denies bowel or bladder incontinence.  She denies recent falls, injuries, or ER visits.  There has been no change since she was last seen.    Patient continues to take naproxen for pain relief.  She takes gabapentin 100 mg at bedtime.  She takes tizanidine as needed for muscle pain relief.  She denies side effects from her medications.    I discussed the plan of care including pharmacologic and joint interventional procedure.  I discussed left SI injection as her pain is more at the sacroiliac joint.  She is in agreement to proceed to this procedure.  This is done under fluoroscopy.  Questions were answered during this encounter.      -------------  12/19/2023: Lumbar epidural block #2 at  L5-S1  ----------            Past Medical History  She has a past medical history of Essential (primary) hypertension (09/20/2022), Gastro-esophageal reflux disease without esophagitis (03/02/2022), Hyperglycemia, unspecified (06/30/2021), Hyperlipidemia, unspecified (09/20/2022), Other muscle spasm (12/01/2022), Presence of right artificial hip joint (06/30/2022), Proteinuria, unspecified (09/20/2022), Restless legs syndrome (12/01/2022), and Vitamin D deficiency, unspecified (09/01/2022).    Surgical History  Past Surgical History:   Procedure Laterality Date    OTHER SURGICAL HISTORY  04/04/2019    Cholecystectomy    OTHER SURGICAL HISTORY  04/04/2019    Tubal ligation    OTHER SURGICAL HISTORY  04/04/2019    Hip replacement    OTHER SURGICAL HISTORY  04/04/2019    Tonsillectomy    OTHER SURGICAL HISTORY  07/01/2021    Complete colonoscopy    OTHER SURGICAL HISTORY  07/01/2021    Complete colonoscopy    OTHER SURGICAL HISTORY  07/01/2021    Complete colonoscopy        Social History  She reports that she has never smoked. She has never been exposed to tobacco smoke. She has never used smokeless tobacco. She reports that she does not drink alcohol and does not use drugs.    Family History  Family History   Problem Relation Name Age of Onset    Diabetes Mother      COPD Mother      Thyroid disease Mother          Allergies  Metoprolol, Ace inhibitors, Atorvastatin, Atorvastatin calcium, Metformin, Pseudoephedrine hcl, Triamterene-hydrochlorothiazid, and Erythromycin      Current Outpatient Medications:     acetaminophen (Tylenol) 500 mg tablet, Take by mouth., Disp: , Rfl:     amLODIPine (Norvasc) 10 mg tablet, Take 1 tablet (10 mg) by mouth once daily., Disp: 90 tablet, Rfl: 0    glimepiride (AmaryL) 2 mg tablet, Take 1 tablet (2 mg) by mouth once daily in the evening. Take with meals., Disp: 30 tablet, Rfl: 1    losartan (Cozaar) 50 mg tablet, TAKE 1 TABLET BY MOUTH DAILY, Disp: 90 tablet, Rfl: 0    naproxen  (Naprosyn) 250 mg tablet, Take 1 tablet (250 mg) by mouth 2 times a day with meals., Disp: , Rfl:     OneTouch Delica Plus Lancet 33 gauge misc, , Disp: , Rfl:     OneTouch Verio Reflect Meter misc, , Disp: , Rfl:     OneTouch Verio test strips strip, , Disp: , Rfl:     rOPINIRole (Requip) 1 mg tablet, Take 1 tablet (1 mg) by mouth once daily at bedtime., Disp: 90 tablet, Rfl: 0    gabapentin (Neurontin) 100 mg capsule, Take 2 capsules (200 mg) by mouth once daily at bedtime., Disp: 60 capsule, Rfl: 2    tiZANidine (Zanaflex) 2 mg tablet, Take 1 tablet by mouth once daily if needed, Disp: 90 tablet, Rfl: 1     Review of Systems   Constitutional: Negative.    HENT: Negative.     Eyes: Negative.    Respiratory: Negative.     Cardiovascular: Negative.    Gastrointestinal: Negative.    Endocrine: Negative.    Genitourinary: Negative.    Musculoskeletal:  Positive for arthralgias, back pain and myalgias. Negative for gait problem, joint swelling, neck pain and neck stiffness.   Skin: Negative.    Allergic/Immunologic: Negative.    Neurological: Negative.    Psychiatric/Behavioral: Negative.          Physical Exam  Vitals and nursing note reviewed.   HENT:      Head: Normocephalic.      Nose: Nose normal.   Eyes:      Extraocular Movements: Extraocular movements intact.      Conjunctiva/sclera: Conjunctivae normal.      Pupils: Pupils are equal, round, and reactive to light.   Cardiovascular:      Rate and Rhythm: Normal rate and regular rhythm.   Pulmonary:      Effort: Pulmonary effort is normal.      Breath sounds: Normal breath sounds.   Musculoskeletal:         General: Tenderness present. No swelling, deformity or signs of injury.      Cervical back: No rigidity or tenderness.      Lumbar back: Tenderness present.      Right lower leg: No edema.      Left lower leg: No edema.      Comments: Negative leg raise.  Positive left Fabere's test eliciting back pain.  Positive for provocative test including Fabere's,  compression and thigh thrust.  She has difficulty lifting her left leg.  Negative for paraspinal tenderness at the lumbar region.  No radicular symptoms.  Positive for left SI joint pain on palpation.  BUE 4-5/5, BLE 4/5.   Skin:     General: Skin is warm and dry.   Neurological:      General: No focal deficit present.      Mental Status: She is alert and oriented to person, place, and time.   Psychiatric:         Mood and Affect: Mood normal.         Behavior: Behavior normal.          Last Recorded Vitals  BP (!) 175/109   Pulse 99   Temp 36.7 °C (98 °F) (Temporal)   Wt 137 kg (303 lb)     Relevant Results      Pain Management Panel  More data may exist         Latest Ref Rng & Units 12/1/2023 8/28/2023   Pain Management Panel   Amphetamine Screen, Urine Presumptive Negative Presumptive Negative  PRESUMPTIVE NEGATIVE    Barbiturate Screen, Urine Presumptive Negative Presumptive Negative  PRESUMPTIVE NEGATIVE    Benzodiazepines Screen, Urine Presumptive Negative Presumptive Negative  -   Fentanyl Screen, Urine Presumptive Negative Presumptive Negative  PRESUMPTIVE NEGATIVE         Narrative & Impression   MRN: [id number]  Patient Name: EARLINE ADHIKARI     STUDY:  MRI L-SPINE WO;  7/28/2022 4:26 pm     INDICATION:  back and right hip pain  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     COMPARISON:  None.     ACCESSION NUMBER(S):  [id number]     ORDERING CLINICIAN:  MAKENZIE DE     TECHNIQUE:  The lumbar spine was studied in the sagital, axial and coronal planes  utiliing T1 and T2 weighted images.     FINDINGS:  The marrow signal and vertebral body height are normal. The conus and  sacrum are normal.  Images at each interspace reveal the following:  L1/L2  There is normal alignment and vertebral body height. The disc space  is normal. There is no evidence of canal or foraminal narrowing.  There is no evidence of bulging or herniated disc.  L2/L3  There is normal alignment and  vertebral body height. The disc space  is normal. There is no evidence of canal or foraminal narrowing.  There is no evidence of bulging or herniated disc.  L3/L4  Mild bilateral facet hypertrophy without canal or foraminal narrowing  L4/L5  Trace spondylolisthesis and pseudo bulging intervertebral disc.  Bilateral facet hypertrophy. Flattening of the thecal sac which  measures approximately 7 mm in AP dimension in the midline. Mild  bilateral foraminal narrowing.  L5/S1  Bilateral facet hypertrophy with mild foraminal narrowing. No  measurable canal stenosis.        IMPRESSION:  * Mild lumbar spondylosis as described         Assessment/Plan   Problem List Items Addressed This Visit             ICD-10-CM    Facet arthropathy, lumbosacral M47.817    Relevant Medications    gabapentin (Neurontin) 100 mg capsule    Muscle spasm M62.838    Relevant Medications    tiZANidine (Zanaflex) 2 mg tablet    Lumbar radiculopathy, chronic M54.16    Relevant Medications    gabapentin (Neurontin) 100 mg capsule    Lumbar stenosis with neurogenic claudication M48.062    Relevant Medications    gabapentin (Neurontin) 100 mg capsule    Sacroiliitis (CMS/HCC) - Primary M46.1    Relevant Orders    Sacroiliac Joint Injection              1. Sacroiliitis (CMS/HCC)  - Sacroiliac Joint Injection; Future    2. Lumbar radiculopathy, chronic  - gabapentin (Neurontin) 100 mg capsule; Take 2 capsules (200 mg) by mouth once daily at bedtime.  Dispense: 60 capsule; Refill: 2    3. Lumbar stenosis with neurogenic claudication  - gabapentin (Neurontin) 100 mg capsule; Take 2 capsules (200 mg) by mouth once daily at bedtime.  Dispense: 60 capsule; Refill: 2    4. Facet arthropathy, lumbosacral  - gabapentin (Neurontin) 100 mg capsule; Take 2 capsules (200 mg) by mouth once daily at bedtime.  Dispense: 60 capsule; Refill: 2    5. Muscle spasm  - tiZANidine (Zanaflex) 2 mg tablet; Take 1 tablet by mouth once daily if needed  Dispense: 90 tablet;  Refill: 1       Plan/Follow-up Instructions:     I refilled your tizanidine and you may take it as needed for muscle pain relief.  Do not take sedating medications together.    I increase your gabapentin.  Start taking 200 mg at bedtime if tolerated.  Do not take sedating medications together.    You are scheduled for left SI joint injection on 3/12/2024 in [place] with Dr. Hernandez.  No Aleve on this day.  If getting sedation then you must not eat or drink 6 hours before the procedure and you must have a  with you.  The office will call you for further instructions.  You will receive Cipro before the procedure due to your history of right total hip replacement.  You will then be seen for your postprocedure follow-up in 6 to 8 weeks.      Disclaimer: This note was created using voice recognition software. It was not corrected for typographical or grammatical errors, inadvertent word insertion, or any unintended errors. Please feel free to contact me for clarification.        Jocelyn Quiñones DNP, APRN, FNP-C    [facility]  Office #: [phone]  Fax # [phone]

## 2024-02-16 NOTE — H&P (VIEW-ONLY)
Subjective   Patient ID: Lindsey Herrera is a 63 y.o. female who presents for Follow-up (Post procedure follow up).    Lindsey is a pleasant 63-year-old  female who is here for a postprocedure follow-up.  Patient is ambulatory.  Gait is steady.  She arrives by herself.    Patient had lumbar epidural block #2 at L5-S1 on 12/19/2023.  The injection has improved her back pain.  It provided 80% relief.  She is still feeling better.  The injection has improved her pain and her ability to participate in her daily activities.    Patient continues to have chronic back pain more so at the left side that goes down to her hip.  She rates her pain as 10 out of 10.  However she does not appear to be at this level of pain.  Pain is constant or comes and goes depending on her activities.  She describes her pain as burning, sharp, shooting and stabbing kind of pain.  She has pain to her left hip buttock with sitting.  She reports it is worse when using the bathroom.  She has stinging sensation to the left hip area after she sits down.  She reports this has not been ongoing but not as bad.  It worsened since January.  She denies pain, numbness or tingling sensation to her legs.  She denies leg weakness or change in balance.  She denies bowel or bladder incontinence.  She denies recent falls, injuries, or ER visits.  There has been no change since she was last seen.    Patient continues to take naproxen for pain relief.  She takes gabapentin 100 mg at bedtime.  She takes tizanidine as needed for muscle pain relief.  She denies side effects from her medications.    I discussed the plan of care including pharmacologic and joint interventional procedure.  I discussed left SI injection as her pain is more at the sacroiliac joint.  She is in agreement to proceed to this procedure.  This is done under fluoroscopy.  Questions were answered during this encounter.      -------------  12/19/2023: Lumbar epidural block #2 at  L5-S1  ----------            Past Medical History  She has a past medical history of Essential (primary) hypertension (09/20/2022), Gastro-esophageal reflux disease without esophagitis (03/02/2022), Hyperglycemia, unspecified (06/30/2021), Hyperlipidemia, unspecified (09/20/2022), Other muscle spasm (12/01/2022), Presence of right artificial hip joint (06/30/2022), Proteinuria, unspecified (09/20/2022), Restless legs syndrome (12/01/2022), and Vitamin D deficiency, unspecified (09/01/2022).    Surgical History  Past Surgical History:   Procedure Laterality Date    OTHER SURGICAL HISTORY  04/04/2019    Cholecystectomy    OTHER SURGICAL HISTORY  04/04/2019    Tubal ligation    OTHER SURGICAL HISTORY  04/04/2019    Hip replacement    OTHER SURGICAL HISTORY  04/04/2019    Tonsillectomy    OTHER SURGICAL HISTORY  07/01/2021    Complete colonoscopy    OTHER SURGICAL HISTORY  07/01/2021    Complete colonoscopy    OTHER SURGICAL HISTORY  07/01/2021    Complete colonoscopy        Social History  She reports that she has never smoked. She has never been exposed to tobacco smoke. She has never used smokeless tobacco. She reports that she does not drink alcohol and does not use drugs.    Family History  Family History   Problem Relation Name Age of Onset    Diabetes Mother      COPD Mother      Thyroid disease Mother          Allergies  Metoprolol, Ace inhibitors, Atorvastatin, Atorvastatin calcium, Metformin, Pseudoephedrine hcl, Triamterene-hydrochlorothiazid, and Erythromycin      Current Outpatient Medications:     acetaminophen (Tylenol) 500 mg tablet, Take by mouth., Disp: , Rfl:     amLODIPine (Norvasc) 10 mg tablet, Take 1 tablet (10 mg) by mouth once daily., Disp: 90 tablet, Rfl: 0    glimepiride (AmaryL) 2 mg tablet, Take 1 tablet (2 mg) by mouth once daily in the evening. Take with meals., Disp: 30 tablet, Rfl: 1    losartan (Cozaar) 50 mg tablet, TAKE 1 TABLET BY MOUTH DAILY, Disp: 90 tablet, Rfl: 0    naproxen  (Naprosyn) 250 mg tablet, Take 1 tablet (250 mg) by mouth 2 times a day with meals., Disp: , Rfl:     OneTouch Delica Plus Lancet 33 gauge misc, , Disp: , Rfl:     OneTouch Verio Reflect Meter misc, , Disp: , Rfl:     OneTouch Verio test strips strip, , Disp: , Rfl:     rOPINIRole (Requip) 1 mg tablet, Take 1 tablet (1 mg) by mouth once daily at bedtime., Disp: 90 tablet, Rfl: 0    gabapentin (Neurontin) 100 mg capsule, Take 2 capsules (200 mg) by mouth once daily at bedtime., Disp: 60 capsule, Rfl: 2    tiZANidine (Zanaflex) 2 mg tablet, Take 1 tablet by mouth once daily if needed, Disp: 90 tablet, Rfl: 1     Review of Systems   Constitutional: Negative.    HENT: Negative.     Eyes: Negative.    Respiratory: Negative.     Cardiovascular: Negative.    Gastrointestinal: Negative.    Endocrine: Negative.    Genitourinary: Negative.    Musculoskeletal:  Positive for arthralgias, back pain and myalgias. Negative for gait problem, joint swelling, neck pain and neck stiffness.   Skin: Negative.    Allergic/Immunologic: Negative.    Neurological: Negative.    Psychiatric/Behavioral: Negative.          Physical Exam  Vitals and nursing note reviewed.   HENT:      Head: Normocephalic.      Nose: Nose normal.   Eyes:      Extraocular Movements: Extraocular movements intact.      Conjunctiva/sclera: Conjunctivae normal.      Pupils: Pupils are equal, round, and reactive to light.   Cardiovascular:      Rate and Rhythm: Normal rate and regular rhythm.   Pulmonary:      Effort: Pulmonary effort is normal.      Breath sounds: Normal breath sounds.   Musculoskeletal:         General: Tenderness present. No swelling, deformity or signs of injury.      Cervical back: No rigidity or tenderness.      Lumbar back: Tenderness present.      Right lower leg: No edema.      Left lower leg: No edema.      Comments: Negative leg raise.  Positive left Fabere's test eliciting back pain.  Positive for provocative test including Fabere's,  compression and thigh thrust.  She has difficulty lifting her left leg.  Negative for paraspinal tenderness at the lumbar region.  No radicular symptoms.  Positive for left SI joint pain on palpation.  BUE 4-5/5, BLE 4/5.   Skin:     General: Skin is warm and dry.   Neurological:      General: No focal deficit present.      Mental Status: She is alert and oriented to person, place, and time.   Psychiatric:         Mood and Affect: Mood normal.         Behavior: Behavior normal.          Last Recorded Vitals  BP (!) 175/109   Pulse 99   Temp 36.7 °C (98 °F) (Temporal)   Wt 137 kg (303 lb)     Relevant Results      Pain Management Panel  More data may exist         Latest Ref Rng & Units 12/1/2023 8/28/2023   Pain Management Panel   Amphetamine Screen, Urine Presumptive Negative Presumptive Negative  PRESUMPTIVE NEGATIVE    Barbiturate Screen, Urine Presumptive Negative Presumptive Negative  PRESUMPTIVE NEGATIVE    Benzodiazepines Screen, Urine Presumptive Negative Presumptive Negative  -   Fentanyl Screen, Urine Presumptive Negative Presumptive Negative  PRESUMPTIVE NEGATIVE         Narrative & Impression   MRN: [id number]  Patient Name: EARLINE ADHIKARI     STUDY:  MRI L-SPINE WO;  7/28/2022 4:26 pm     INDICATION:  back and right hip pain  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     COMPARISON:  None.     ACCESSION NUMBER(S):  [id number]     ORDERING CLINICIAN:  MAKENZIE DE     TECHNIQUE:  The lumbar spine was studied in the sagital, axial and coronal planes  utiliing T1 and T2 weighted images.     FINDINGS:  The marrow signal and vertebral body height are normal. The conus and  sacrum are normal.  Images at each interspace reveal the following:  L1/L2  There is normal alignment and vertebral body height. The disc space  is normal. There is no evidence of canal or foraminal narrowing.  There is no evidence of bulging or herniated disc.  L2/L3  There is normal alignment and  vertebral body height. The disc space  is normal. There is no evidence of canal or foraminal narrowing.  There is no evidence of bulging or herniated disc.  L3/L4  Mild bilateral facet hypertrophy without canal or foraminal narrowing  L4/L5  Trace spondylolisthesis and pseudo bulging intervertebral disc.  Bilateral facet hypertrophy. Flattening of the thecal sac which  measures approximately 7 mm in AP dimension in the midline. Mild  bilateral foraminal narrowing.  L5/S1  Bilateral facet hypertrophy with mild foraminal narrowing. No  measurable canal stenosis.        IMPRESSION:  * Mild lumbar spondylosis as described         Assessment/Plan   Problem List Items Addressed This Visit             ICD-10-CM    Facet arthropathy, lumbosacral M47.817    Relevant Medications    gabapentin (Neurontin) 100 mg capsule    Muscle spasm M62.838    Relevant Medications    tiZANidine (Zanaflex) 2 mg tablet    Lumbar radiculopathy, chronic M54.16    Relevant Medications    gabapentin (Neurontin) 100 mg capsule    Lumbar stenosis with neurogenic claudication M48.062    Relevant Medications    gabapentin (Neurontin) 100 mg capsule    Sacroiliitis (CMS/HCC) - Primary M46.1    Relevant Orders    Sacroiliac Joint Injection              1. Sacroiliitis (CMS/HCC)  - Sacroiliac Joint Injection; Future    2. Lumbar radiculopathy, chronic  - gabapentin (Neurontin) 100 mg capsule; Take 2 capsules (200 mg) by mouth once daily at bedtime.  Dispense: 60 capsule; Refill: 2    3. Lumbar stenosis with neurogenic claudication  - gabapentin (Neurontin) 100 mg capsule; Take 2 capsules (200 mg) by mouth once daily at bedtime.  Dispense: 60 capsule; Refill: 2    4. Facet arthropathy, lumbosacral  - gabapentin (Neurontin) 100 mg capsule; Take 2 capsules (200 mg) by mouth once daily at bedtime.  Dispense: 60 capsule; Refill: 2    5. Muscle spasm  - tiZANidine (Zanaflex) 2 mg tablet; Take 1 tablet by mouth once daily if needed  Dispense: 90 tablet;  Refill: 1       Plan/Follow-up Instructions:     I refilled your tizanidine and you may take it as needed for muscle pain relief.  Do not take sedating medications together.    I increase your gabapentin.  Start taking 200 mg at bedtime if tolerated.  Do not take sedating medications together.    You are scheduled for left SI joint injection on 3/12/2024 in [place] with Dr. Hernandez.  No Aleve on this day.  If getting sedation then you must not eat or drink 6 hours before the procedure and you must have a  with you.  The office will call you for further instructions.  You will receive Cipro before the procedure due to your history of right total hip replacement.  You will then be seen for your postprocedure follow-up in 6 to 8 weeks.      Disclaimer: This note was created using voice recognition software. It was not corrected for typographical or grammatical errors, inadvertent word insertion, or any unintended errors. Please feel free to contact me for clarification.        Jocelyn Quiñones DNP, APRN, FNP-C    [facility]/[place] [facility]  Office #: [phone]  Fax # [phone]

## 2024-02-16 NOTE — PATIENT INSTRUCTIONS
I refilled your tizanidine and you may take it as needed for muscle pain relief.  Do not take sedating medications together.    I increase your gabapentin.  Start taking 200 mg at bedtime if tolerated.  Do not take sedating medications together.    You are scheduled for left SI joint injection on 3/12/2024 in [place] with Dr. Hernandez.  Pam Jaime on this day.  If getting sedation then you must not eat or drink 6 hours before the procedure and you must have a  with you.  The office will call you for further instructions.  You will receive Cipro before the procedure due to your history of right total hip replacement.  You will then be seen for your postprocedure follow-up in 6 to 8 weeks.

## 2024-02-21 DIAGNOSIS — E11.69 TYPE 2 DIABETES MELLITUS WITH OTHER SPECIFIED COMPLICATION, WITHOUT LONG-TERM CURRENT USE OF INSULIN (MULTI): ICD-10-CM

## 2024-02-22 RX ORDER — GLIMEPIRIDE 2 MG/1
2 TABLET ORAL
Qty: 30 TABLET | Refills: 0 | Status: SHIPPED | OUTPATIENT
Start: 2024-02-22 | End: 2024-04-01 | Stop reason: SDUPTHER

## 2024-02-23 PROBLEM — I25.10 ARTERIOSCLEROSIS OF CORONARY ARTERY: Status: ACTIVE | Noted: 2024-02-23

## 2024-02-23 PROBLEM — Z96.649 HISTORY OF TOTAL HIP REPLACEMENT: Status: ACTIVE | Noted: 2024-02-23

## 2024-02-26 ENCOUNTER — OFFICE VISIT (OUTPATIENT)
Dept: PRIMARY CARE | Facility: CLINIC | Age: 64
End: 2024-02-26
Payer: COMMERCIAL

## 2024-02-26 VITALS
OXYGEN SATURATION: 97 % | WEIGHT: 293 LBS | DIASTOLIC BLOOD PRESSURE: 96 MMHG | BODY MASS INDEX: 48.46 KG/M2 | HEART RATE: 92 BPM | SYSTOLIC BLOOD PRESSURE: 142 MMHG

## 2024-02-26 DIAGNOSIS — I10 HYPERTENSION, UNSPECIFIED TYPE: ICD-10-CM

## 2024-02-26 DIAGNOSIS — M48.062 LUMBAR STENOSIS WITH NEUROGENIC CLAUDICATION: ICD-10-CM

## 2024-02-26 DIAGNOSIS — E11.69 TYPE 2 DIABETES MELLITUS WITH OTHER SPECIFIED COMPLICATION, WITHOUT LONG-TERM CURRENT USE OF INSULIN (MULTI): Primary | ICD-10-CM

## 2024-02-26 DIAGNOSIS — E55.9 VITAMIN D DEFICIENCY: ICD-10-CM

## 2024-02-26 DIAGNOSIS — E66.01 CLASS 3 SEVERE OBESITY DUE TO EXCESS CALORIES WITH SERIOUS COMORBIDITY AND BODY MASS INDEX (BMI) OF 45.0 TO 49.9 IN ADULT (MULTI): ICD-10-CM

## 2024-02-26 DIAGNOSIS — E78.00 HYPERCHOLESTEREMIA: ICD-10-CM

## 2024-02-26 DIAGNOSIS — K44.9 HIATAL HERNIA: ICD-10-CM

## 2024-02-26 PROCEDURE — 3008F BODY MASS INDEX DOCD: CPT | Performed by: INTERNAL MEDICINE

## 2024-02-26 PROCEDURE — 4010F ACE/ARB THERAPY RXD/TAKEN: CPT | Performed by: INTERNAL MEDICINE

## 2024-02-26 PROCEDURE — 99214 OFFICE O/P EST MOD 30 MIN: CPT | Performed by: INTERNAL MEDICINE

## 2024-02-26 PROCEDURE — 3077F SYST BP >= 140 MM HG: CPT | Performed by: INTERNAL MEDICINE

## 2024-02-26 PROCEDURE — 3080F DIAST BP >= 90 MM HG: CPT | Performed by: INTERNAL MEDICINE

## 2024-02-26 PROCEDURE — 1036F TOBACCO NON-USER: CPT | Performed by: INTERNAL MEDICINE

## 2024-02-26 RX ORDER — LOSARTAN POTASSIUM 100 MG/1
TABLET ORAL
Qty: 60 TABLET | Refills: 0 | Status: SHIPPED | OUTPATIENT
Start: 2024-02-26 | End: 2024-04-18 | Stop reason: SDUPTHER

## 2024-02-26 RX ORDER — LOSARTAN POTASSIUM 100 MG/1
TABLET ORAL
Qty: 90 TABLET | Refills: 0 | Status: SHIPPED | OUTPATIENT
Start: 2024-02-26 | End: 2024-02-26

## 2024-02-26 RX ORDER — AMLODIPINE BESYLATE 10 MG/1
10 TABLET ORAL DAILY
Qty: 60 TABLET | Refills: 0 | Status: SHIPPED | OUTPATIENT
Start: 2024-02-26 | End: 2024-04-18 | Stop reason: SDUPTHER

## 2024-02-26 NOTE — PROGRESS NOTES
"Subjective   Patient ID: Lindsey Herrera is a 63 y.o. female who presents for Follow-up (3 mth).  HPI    Routine follow up    Hiatal hernia   diet discussed   Using otc daily    Hypertension high on therapy no side effects  Increase losartan 100 mg daily  Continue norvasc  follow     Hypercholesterolemia patient stopped therapy \"felt she didn't need it\"  Cardio will recheck in October / not done  Labs and cardio follow up cancelled by pt / not rescheduled     CAD by elevated CT cardiac score patient on aspirin.    Cardiology following but pt cancelled follow up  Pt aware of risks including death     DM A1c 7.6  7/23 patient still refuses medication risk / benefits reviewed.  She will recheck her A1c before her next appointment with cardio  FBS not checked either  Aware of risks including but not limited to  MI, stroke, blindness, renal failure and death  Endo consult scheduled 4-24    Chronic back pain  Pain mgmt following  Injection pending     Dense breast tissue on mammogram patient not interested in MRI of the breast.     Hypercalcemia improved continue to follow     Abnormal AST and ALT improved continue to follow     Diet and exercise reviewed     Follow up labs      GYN, colonoscopy, mammogram and dexa  Pt declined at present     Immunizations rev'd    pt declined  Risks rev'd including death / pt understood     Non compliance discussed    Review of Systems   All other systems reviewed and are negative.      Objective   BP (!) 142/96   Pulse 92   Wt 140 kg (309 lb 6.4 oz)   SpO2 97%   BMI 48.46 kg/m²   Lab Results   Component Value Date    WBC 10.9 03/01/2023    HGB 15.1 03/01/2023    HCT 46.0 03/01/2023     03/01/2023    CHOL 233 (H) 09/03/2022    TRIG 231 (H) 09/03/2022    HDL 49.0 09/03/2022    ALT 53 (H) 03/01/2023    AST 44 (H) 03/01/2023     (L) 03/01/2023    K 4.2 03/01/2023     03/01/2023    CREATININE 0.78 03/01/2023    BUN 22 03/01/2023    CO2 22 03/01/2023    TSH 3.80 " 07/08/2023    HGBA1C 7.6 (A) 07/08/2023           Physical Exam  Vitals reviewed.   Constitutional:       Appearance: Normal appearance. She is obese.   HENT:      Head: Normocephalic and atraumatic.      Mouth/Throat:      Pharynx: No posterior oropharyngeal erythema.   Eyes:      General: No scleral icterus.     Conjunctiva/sclera: Conjunctivae normal.      Pupils: Pupils are equal, round, and reactive to light.   Cardiovascular:      Rate and Rhythm: Normal rate and regular rhythm.      Heart sounds: Normal heart sounds.   Pulmonary:      Effort: No respiratory distress.      Breath sounds: No wheezing.   Abdominal:      General: Abdomen is flat. Bowel sounds are normal. There is no distension.      Palpations: Abdomen is soft. There is no mass.      Tenderness: There is no abdominal tenderness. There is no rebound.   Musculoskeletal:         General: Normal range of motion.      Cervical back: Normal range of motion and neck supple.   Skin:     General: Skin is warm and dry.   Neurological:      General: No focal deficit present.      Mental Status: She is alert and oriented to person, place, and time. Mental status is at baseline.   Psychiatric:         Mood and Affect: Mood normal.         Behavior: Behavior normal.         Thought Content: Thought content normal.         Judgment: Judgment normal.         Problem List Items Addressed This Visit             ICD-10-CM    DM (diabetes mellitus), type 2 (CMS/[facility]) - Primary E11.9    Relevant Orders    Hemoglobin A1C    Albumin , Urine Random    Vitamin D deficiency E55.9    Relevant Orders    Vitamin D 25 hydroxy    Hiatal hernia K44.9    Class 3 severe obesity due to excess calories with serious comorbidity and body mass index (BMI) of 45.0 to 49.9 in adult (CMS/[facility]) E66.01, Z68.42    Lumbar stenosis with neurogenic claudication M48.062     Other Visit Diagnoses         Codes    Hypertension, unspecified type     I10    Relevant Medications    amLODIPine  "(Norvasc) 10 mg tablet    losartan (Cozaar) 100 mg tablet    Other Relevant Orders    CBC and Auto Differential    Hypercholesteremia     E78.00    Relevant Orders    Comprehensive Metabolic Panel    Lipid Panel    TSH with reflex to Free T4 if abnormal          Assessment/Plan     Hiatal hernia   diet discussed   Using otc daily    Hypertension high on therapy no side effects  Increase losartan 100 mg daily  Continue norvasc  follow     Hypercholesterolemia patient stopped therapy \"felt she didn't need it\"  Cardio will recheck in October / not done  Labs and cardio follow up cancelled by pt / not rescheduled     CAD by elevated CT cardiac score patient on aspirin.    Cardiology following but pt cancelled follow up  Pt aware of risks including death     DM A1c 7.6  7/23 patient still refuses medication risk / benefits reviewed.  She will recheck her A1c before her next appointment with cardio  FBS not checked either  Aware of risks including but not limited to  MI, stroke, blindness, renal failure and death  Endo consult scheduled 4-24    Chronic back pain  Pain mgmt following  Injection pending     Dense breast tissue on mammogram patient not interested in MRI of the breast.     Hypercalcemia improved continue to follow     Abnormal AST and ALT improved continue to follow     Diet and exercise reviewed     Follow up labs      GYN, colonoscopy, mammogram and dexa  Pt declined at present     Immunizations rev'd    pt declined  Risks rev'd including death / pt understood     Non compliant    Mammogram 11-22  Dexa none  Colonoscopy 17  was to recheck 3-5 yrs / past due  CT chest lung cancer screening n/a  GYN past due  immunizations rev'd  BMI 48.4    Follow up 6 weeks  "

## 2024-02-27 PROCEDURE — RXMED WILLOW AMBULATORY MEDICATION CHARGE

## 2024-02-29 ENCOUNTER — PHARMACY VISIT (OUTPATIENT)
Dept: PHARMACY | Facility: CLINIC | Age: 64
End: 2024-02-29
Payer: COMMERCIAL

## 2024-03-09 ENCOUNTER — LAB (OUTPATIENT)
Dept: LAB | Facility: LAB | Age: 64
End: 2024-03-09
Payer: COMMERCIAL

## 2024-03-09 DIAGNOSIS — I10 HYPERTENSION, UNSPECIFIED TYPE: ICD-10-CM

## 2024-03-09 DIAGNOSIS — E78.00 HYPERCHOLESTEREMIA: ICD-10-CM

## 2024-03-09 DIAGNOSIS — E55.9 VITAMIN D DEFICIENCY: ICD-10-CM

## 2024-03-09 DIAGNOSIS — E11.69 TYPE 2 DIABETES MELLITUS WITH OTHER SPECIFIED COMPLICATION, WITHOUT LONG-TERM CURRENT USE OF INSULIN (MULTI): ICD-10-CM

## 2024-03-09 LAB
25(OH)D3 SERPL-MCNC: 26 NG/ML (ref 30–100)
ALBUMIN SERPL BCP-MCNC: 3.9 G/DL (ref 3.4–5)
ALP SERPL-CCNC: 65 U/L (ref 33–136)
ALT SERPL W P-5'-P-CCNC: 43 U/L (ref 7–45)
ANION GAP SERPL CALC-SCNC: 14 MMOL/L (ref 10–20)
AST SERPL W P-5'-P-CCNC: 39 U/L (ref 9–39)
BASOPHILS # BLD AUTO: 0.03 X10*3/UL (ref 0–0.1)
BASOPHILS NFR BLD AUTO: 0.4 %
BILIRUB SERPL-MCNC: 0.4 MG/DL (ref 0–1.2)
BUN SERPL-MCNC: 16 MG/DL (ref 6–23)
CALCIUM SERPL-MCNC: 10.1 MG/DL (ref 8.6–10.3)
CHLORIDE SERPL-SCNC: 106 MMOL/L (ref 98–107)
CHOLEST SERPL-MCNC: 207 MG/DL (ref 0–199)
CHOLESTEROL/HDL RATIO: 5.3
CO2 SERPL-SCNC: 23 MMOL/L (ref 21–32)
CREAT SERPL-MCNC: 0.56 MG/DL (ref 0.5–1.05)
CREAT UR-MCNC: 73.7 MG/DL (ref 20–320)
EGFRCR SERPLBLD CKD-EPI 2021: >90 ML/MIN/1.73M*2
EOSINOPHIL # BLD AUTO: 0.08 X10*3/UL (ref 0–0.7)
EOSINOPHIL NFR BLD AUTO: 1.1 %
ERYTHROCYTE [DISTWIDTH] IN BLOOD BY AUTOMATED COUNT: 13.1 % (ref 11.5–14.5)
EST. AVERAGE GLUCOSE BLD GHB EST-MCNC: 166 MG/DL
GLUCOSE SERPL-MCNC: 144 MG/DL (ref 74–99)
HBA1C MFR BLD: 7.4 %
HCT VFR BLD AUTO: 43.9 % (ref 36–46)
HDLC SERPL-MCNC: 39.1 MG/DL
HGB BLD-MCNC: 14.2 G/DL (ref 12–16)
IMM GRANULOCYTES # BLD AUTO: 0.04 X10*3/UL (ref 0–0.7)
IMM GRANULOCYTES NFR BLD AUTO: 0.5 % (ref 0–0.9)
LDLC SERPL CALC-MCNC: 125 MG/DL
LYMPHOCYTES # BLD AUTO: 2.07 X10*3/UL (ref 1.2–4.8)
LYMPHOCYTES NFR BLD AUTO: 28 %
MCH RBC QN AUTO: 29.3 PG (ref 26–34)
MCHC RBC AUTO-ENTMCNC: 32.3 G/DL (ref 32–36)
MCV RBC AUTO: 91 FL (ref 80–100)
MICROALBUMIN UR-MCNC: 524 MG/L
MICROALBUMIN/CREAT UR: 711 UG/MG CREAT
MONOCYTES # BLD AUTO: 0.48 X10*3/UL (ref 0.1–1)
MONOCYTES NFR BLD AUTO: 6.5 %
NEUTROPHILS # BLD AUTO: 4.69 X10*3/UL (ref 1.2–7.7)
NEUTROPHILS NFR BLD AUTO: 63.5 %
NON HDL CHOLESTEROL: 168 MG/DL (ref 0–149)
NRBC BLD-RTO: 0 /100 WBCS (ref 0–0)
PLATELET # BLD AUTO: 292 X10*3/UL (ref 150–450)
POTASSIUM SERPL-SCNC: 3.9 MMOL/L (ref 3.5–5.3)
PROT SERPL-MCNC: 6.9 G/DL (ref 6.4–8.2)
RBC # BLD AUTO: 4.84 X10*6/UL (ref 4–5.2)
SODIUM SERPL-SCNC: 139 MMOL/L (ref 136–145)
TRIGL SERPL-MCNC: 215 MG/DL (ref 0–149)
TSH SERPL-ACNC: 2.71 MIU/L (ref 0.44–3.98)
VLDL: 43 MG/DL (ref 0–40)
WBC # BLD AUTO: 7.4 X10*3/UL (ref 4.4–11.3)

## 2024-03-09 PROCEDURE — 83036 HEMOGLOBIN GLYCOSYLATED A1C: CPT

## 2024-03-09 PROCEDURE — 82043 UR ALBUMIN QUANTITATIVE: CPT

## 2024-03-09 PROCEDURE — 82306 VITAMIN D 25 HYDROXY: CPT

## 2024-03-09 PROCEDURE — 82570 ASSAY OF URINE CREATININE: CPT

## 2024-03-09 PROCEDURE — 36415 COLL VENOUS BLD VENIPUNCTURE: CPT

## 2024-03-12 ENCOUNTER — HOSPITAL ENCOUNTER (OUTPATIENT)
Dept: PAIN MEDICINE | Facility: HOSPITAL | Age: 64
Discharge: HOME | End: 2024-03-12
Payer: COMMERCIAL

## 2024-03-12 ENCOUNTER — HOSPITAL ENCOUNTER (OUTPATIENT)
Dept: RADIOLOGY | Facility: HOSPITAL | Age: 64
Discharge: HOME | End: 2024-03-12
Payer: COMMERCIAL

## 2024-03-12 VITALS
SYSTOLIC BLOOD PRESSURE: 138 MMHG | TEMPERATURE: 97.7 F | HEART RATE: 88 BPM | RESPIRATION RATE: 18 BRPM | DIASTOLIC BLOOD PRESSURE: 104 MMHG | WEIGHT: 293 LBS | HEIGHT: 67 IN | BODY MASS INDEX: 45.99 KG/M2 | OXYGEN SATURATION: 95 %

## 2024-03-12 DIAGNOSIS — M46.1 SACROILIITIS (CMS-HCC): ICD-10-CM

## 2024-03-12 DIAGNOSIS — M54.16 LUMBAR RADICULOPATHY, CHRONIC: ICD-10-CM

## 2024-03-12 DIAGNOSIS — R35.0 URINARY FREQUENCY: Primary | ICD-10-CM

## 2024-03-12 LAB — GLUCOSE BLD MANUAL STRIP-MCNC: 140 MG/DL (ref 74–99)

## 2024-03-12 PROCEDURE — 2500000001 HC RX 250 WO HCPCS SELF ADMINISTERED DRUGS (ALT 637 FOR MEDICARE OP)

## 2024-03-12 PROCEDURE — 82947 ASSAY GLUCOSE BLOOD QUANT: CPT

## 2024-03-12 PROCEDURE — 27096 INJECT SACROILIAC JOINT: CPT | Performed by: ANESTHESIOLOGY

## 2024-03-12 PROCEDURE — 2550000001 HC RX 255 CONTRASTS

## 2024-03-12 PROCEDURE — 99152 MOD SED SAME PHYS/QHP 5/>YRS: CPT | Performed by: ANESTHESIOLOGY

## 2024-03-12 PROCEDURE — 2500000004 HC RX 250 GENERAL PHARMACY W/ HCPCS (ALT 636 FOR OP/ED): Performed by: NURSE PRACTITIONER

## 2024-03-12 PROCEDURE — 99153 MOD SED SAME PHYS/QHP EA: CPT | Performed by: ANESTHESIOLOGY

## 2024-03-12 PROCEDURE — 2500000004 HC RX 250 GENERAL PHARMACY W/ HCPCS (ALT 636 FOR OP/ED): Performed by: ANESTHESIOLOGY

## 2024-03-12 RX ORDER — MIDAZOLAM HYDROCHLORIDE 1 MG/ML
INJECTION, SOLUTION INTRAMUSCULAR; INTRAVENOUS AS NEEDED
Status: COMPLETED | OUTPATIENT
Start: 2024-03-12 | End: 2024-03-12

## 2024-03-12 RX ORDER — OMEPRAZOLE 20 MG/1
20 TABLET, DELAYED RELEASE ORAL
COMMUNITY

## 2024-03-12 RX ORDER — CIPROFLOXACIN 500 MG/1
TABLET ORAL
Status: COMPLETED
Start: 2024-03-12 | End: 2024-03-12

## 2024-03-12 RX ORDER — FENTANYL CITRATE 50 UG/ML
INJECTION, SOLUTION INTRAMUSCULAR; INTRAVENOUS AS NEEDED
Status: COMPLETED | OUTPATIENT
Start: 2024-03-12 | End: 2024-03-12

## 2024-03-12 RX ORDER — CIPROFLOXACIN 500 MG/1
500 TABLET ORAL ONCE
Status: DISCONTINUED | OUTPATIENT
Start: 2024-03-12 | End: 2024-03-13 | Stop reason: HOSPADM

## 2024-03-12 RX ORDER — SODIUM CHLORIDE, SODIUM LACTATE, POTASSIUM CHLORIDE, CALCIUM CHLORIDE 600; 310; 30; 20 MG/100ML; MG/100ML; MG/100ML; MG/100ML
100 INJECTION, SOLUTION INTRAVENOUS CONTINUOUS
Status: DISCONTINUED | OUTPATIENT
Start: 2024-03-12 | End: 2024-03-13 | Stop reason: HOSPADM

## 2024-03-12 RX ADMIN — MIDAZOLAM 2 MG: 1 INJECTION INTRAMUSCULAR; INTRAVENOUS at 10:35

## 2024-03-12 RX ADMIN — IOHEXOL 5 ML: 240 INJECTION, SOLUTION INTRATHECAL; INTRAVASCULAR; INTRAVENOUS; ORAL at 10:40

## 2024-03-12 RX ADMIN — FENTANYL CITRATE 50 MCG: 50 INJECTION, SOLUTION INTRAMUSCULAR; INTRAVENOUS at 10:36

## 2024-03-12 RX ADMIN — FENTANYL CITRATE 50 MCG: 50 INJECTION, SOLUTION INTRAMUSCULAR; INTRAVENOUS at 10:35

## 2024-03-12 RX ADMIN — CIPROFLOXACIN 500 MG: 500 TABLET, FILM COATED ORAL at 09:51

## 2024-03-12 RX ADMIN — SODIUM CHLORIDE, POTASSIUM CHLORIDE, SODIUM LACTATE AND CALCIUM CHLORIDE 100 ML/HR: 600; 310; 30; 20 INJECTION, SOLUTION INTRAVENOUS at 09:53

## 2024-03-12 ASSESSMENT — PAIN - FUNCTIONAL ASSESSMENT
PAIN_FUNCTIONAL_ASSESSMENT: 0-10

## 2024-03-12 ASSESSMENT — PAIN SCALES - GENERAL
PAINLEVEL_OUTOF10: 6
PAINLEVEL_OUTOF10: 3
PAINLEVEL_OUTOF10: 3

## 2024-03-12 ASSESSMENT — COLUMBIA-SUICIDE SEVERITY RATING SCALE - C-SSRS
6. HAVE YOU EVER DONE ANYTHING, STARTED TO DO ANYTHING, OR PREPARED TO DO ANYTHING TO END YOUR LIFE?: NO
1. IN THE PAST MONTH, HAVE YOU WISHED YOU WERE DEAD OR WISHED YOU COULD GO TO SLEEP AND NOT WAKE UP?: NO
2. HAVE YOU ACTUALLY HAD ANY THOUGHTS OF KILLING YOURSELF?: NO

## 2024-03-12 NOTE — INTERVAL H&P NOTE
H&P reviewed. The patient was examined and there are no changes to the H&P.  Patient reports she has started experiencing pain to her right lower back/sciatic area.  She describes the pain as sharp with radiation down into her right buttock and upper leg.  Pain is more prevalent when sitting on the toilet, eases up when resting. Patient  denies any recent falls, denies sob, cp, n/v/d, difficulty with urination or bm's.

## 2024-03-12 NOTE — DISCHARGE INSTRUCTIONS
Discharge Instructions:  Keep band-aid on for the next 24 hours.  No showering/bathing for the next 24 hours. You may notice soreness or increased pain in the area of your injection, which may continue for the first 48 hours.  Avoid driving or operating any heavy machinery until the next day after the procedure.  You should resume any medications and your regular diet after the procedure. Some of the side affects you may experience from the steroids are: Insomnia (inability to sleep), Increased sweating, Headaches, Increased fluid retention (swelling of your extremities), Increased appetite, Face flushing, If you are a diabetic, your blood sugars may go up.  Closely monitor your diet.  Your blood sugar should return to normal in a few days.  Complications: If the discomfort persists, apply moist heat to the area.  Serious complications are very uncommon but may include bleeding, infection or nerve damage. If sever pain, fever, redness or swelling near the injection site, have someone take you to the nearest emergency room to be evaluated for procedure complications or infection. Expectations: Local anesthetics wear off in several hours but duration of relief varies from individual to individual.  If you have any questions, please call the office at [phone].  If this is an emergency, please go to the nearest emergency room.

## 2024-03-12 NOTE — OP NOTE
Date: 3/12/2024  OR Location: GEN NON-OR PROCEDURES    Name: Lindsey Herrera, : 1960, Age: 63 y.o., MRN: [id number], Sex: female    Diagnosis   1. Sacroiliitis (CMS/HCC)  Sacroiliac Joint Injection    Sacroiliac Joint Injection        Sacroiliac joint injection  Procedures   Preoperative/postop diagnosis: Sacroiliitis; osteoarthritis lumbosacral spine; lumbago  Anesthesia: local  Complications: None  Estimated blood loss: None  Preoperative/postop diagnosis: Sacroiliitis; osteoarthritis lumbosacral spine; lumbago  Patient is a 63 y.o. year-old  female who is here today to undergo a sacroiliac joint injection with fluoroscopy.  A sterile prep and drape ×3 was done with Betadine and ChloraPrep.  I then used a sterile 8 inch ring forceps and identified mid and the inferior border of the SI joint on the Left.  3 cc 1% Xylocaine plain was then injected via 25-gauge 1-1/2 sterile needle for local anesthesia. This was followed by placement of two sterile 22-gauge 3-1/2  spinal needles in the mid point and inferior border of the joint space. Negative aspiration was noted of fluids/blood and there were no paresthesias.  A total of 2 cc of Isovue outlined the joint space which was immediately followed by injection of 40 mg of Kenalog +3 cc of 0.25% Marcaine plain. The needles were then removed and a sterile bandage and Neosporin ointment applied over the puncture sites . A total of two needles were used per side.   The patient tolerated the procedure well and was taken to the recovery room in awake stable condition with no neurologic deficit or pain.  A followup visit is scheduled in 4-6 weeks.

## 2024-03-13 ASSESSMENT — PAIN SCALES - GENERAL: PAINLEVEL_OUTOF10: 1

## 2024-03-25 PROCEDURE — RXMED WILLOW AMBULATORY MEDICATION CHARGE

## 2024-03-27 ENCOUNTER — PHARMACY VISIT (OUTPATIENT)
Dept: PHARMACY | Facility: CLINIC | Age: 64
End: 2024-03-27
Payer: COMMERCIAL

## 2024-04-01 ENCOUNTER — OFFICE VISIT (OUTPATIENT)
Dept: ENDOCRINOLOGY | Facility: CLINIC | Age: 64
End: 2024-04-01
Payer: COMMERCIAL

## 2024-04-01 VITALS
WEIGHT: 293 LBS | BODY MASS INDEX: 47.03 KG/M2 | HEART RATE: 74 BPM | SYSTOLIC BLOOD PRESSURE: 149 MMHG | DIASTOLIC BLOOD PRESSURE: 104 MMHG

## 2024-04-01 DIAGNOSIS — E11.9 TYPE 2 DIABETES MELLITUS WITHOUT COMPLICATION, WITHOUT LONG-TERM CURRENT USE OF INSULIN (MULTI): Primary | ICD-10-CM

## 2024-04-01 PROCEDURE — 3080F DIAST BP >= 90 MM HG: CPT | Performed by: INTERNAL MEDICINE

## 2024-04-01 PROCEDURE — 3008F BODY MASS INDEX DOCD: CPT | Performed by: INTERNAL MEDICINE

## 2024-04-01 PROCEDURE — 3051F HG A1C>EQUAL 7.0%<8.0%: CPT | Performed by: INTERNAL MEDICINE

## 2024-04-01 PROCEDURE — 3049F LDL-C 100-129 MG/DL: CPT | Performed by: INTERNAL MEDICINE

## 2024-04-01 PROCEDURE — 3062F POS MACROALBUMINURIA REV: CPT | Performed by: INTERNAL MEDICINE

## 2024-04-01 PROCEDURE — 1036F TOBACCO NON-USER: CPT | Performed by: INTERNAL MEDICINE

## 2024-04-01 PROCEDURE — RXMED WILLOW AMBULATORY MEDICATION CHARGE

## 2024-04-01 PROCEDURE — 99204 OFFICE O/P NEW MOD 45 MIN: CPT | Performed by: INTERNAL MEDICINE

## 2024-04-01 PROCEDURE — 3077F SYST BP >= 140 MM HG: CPT | Performed by: INTERNAL MEDICINE

## 2024-04-01 PROCEDURE — 4010F ACE/ARB THERAPY RXD/TAKEN: CPT | Performed by: INTERNAL MEDICINE

## 2024-04-01 RX ORDER — TIRZEPATIDE 2.5 MG/.5ML
2.5 INJECTION, SOLUTION SUBCUTANEOUS
Qty: 2 ML | Refills: 0 | Status: SHIPPED | OUTPATIENT
Start: 2024-04-01 | End: 2024-05-03

## 2024-04-01 RX ORDER — GLIMEPIRIDE 2 MG/1
2 TABLET ORAL
Qty: 30 TABLET | Refills: 2 | Status: SHIPPED | OUTPATIENT
Start: 2024-04-01 | End: 2024-05-28

## 2024-04-01 RX ORDER — GLIMEPIRIDE 2 MG/1
2 TABLET ORAL
Qty: 30 TABLET | Refills: 2 | Status: SHIPPED | OUTPATIENT
Start: 2024-04-01 | End: 2024-04-01 | Stop reason: SDUPTHER

## 2024-04-01 RX ORDER — BLOOD-GLUCOSE METER
EACH MISCELLANEOUS
Qty: 100 STRIP | Refills: 3 | Status: SHIPPED | OUTPATIENT
Start: 2024-04-01

## 2024-04-01 RX ORDER — LANCETS 33 GAUGE
EACH MISCELLANEOUS
Qty: 100 EACH | Refills: 3 | Status: SHIPPED | OUTPATIENT
Start: 2024-04-01

## 2024-04-01 RX ORDER — TIRZEPATIDE 5 MG/.5ML
5 INJECTION, SOLUTION SUBCUTANEOUS
Qty: 2 ML | Refills: 10 | Status: SHIPPED | OUTPATIENT
Start: 2024-04-01

## 2024-04-01 ASSESSMENT — ENCOUNTER SYMPTOMS
VOMITING: 0
DIARRHEA: 0
MYALGIAS: 1
COUGH: 0
ARTHRALGIAS: 1
BACK PAIN: 1
SHORTNESS OF BREATH: 0
FREQUENCY: 0
FEVER: 0
POLYDIPSIA: 0
HEADACHES: 0
NERVOUS/ANXIOUS: 0
APPETITE CHANGE: 0
CONSTIPATION: 0
NAUSEA: 0
ABDOMINAL PAIN: 0

## 2024-04-01 NOTE — PATIENT INSTRUCTIONS
RECOMMENDATIONS  Physical activity 30 min 5 days per week  Decrease sugars and starches in diet    Mounjaro 2.5 mg every 7 days for 4 weeks, then increase to 5 mg every 7 days  When you increase to 5 mg, stop glimepiride    If Mounjaro not covered will request OZEMPIC, inject 0.25 mg once every 7 days for 4 weeks.  After 4 weeks, increase to 0.5 mg every 7 days.    Follow up 3-4 months.

## 2024-04-01 NOTE — LETTER
"April 1, 2024     Rachell Gonzalez MD  [facility], [address] [address]    Patient: Marcela Herrera   YOB: 1960   Date of Visit: 4/1/2024       Dear Dr. Rachell Gonzalez MD:    Thank you for referring Marcela Herrera to me for evaluation. Below are my notes for this consultation.  If you have questions, please do not hesitate to call me. I look forward to following your patient along with you.       Sincerely,     Abrahan Haro MD      CC: No Recipients  ______________________________________________________________________________________    History Of Present Illness  Lindsey Herrera \"Marcela\" is a 63 y.o. female     Duration of type 2 diabetes mellitus:  8 years  Complications:  Albuminuria    Diabetes education several years ago.     Lumbar steroid injections three times in the past year, last to right S-I joint 3-4 weeks ago.    Glimepiride 2 mg in the evening, taking only 4 days per week    History of diarrhea on metformin.     Patient is testing glucose less than daily  Records not provided    Last eye exam:  unknown    Hyperlipidemia  She refuses statins    Past Medical History  She has a past medical history of Essential (primary) hypertension (09/20/2022), Gastro-esophageal reflux disease without esophagitis (03/02/2022), Hyperglycemia, unspecified (06/30/2021), Hyperlipidemia, unspecified (09/20/2022), Other muscle spasm (12/01/2022), Presence of right artificial hip joint (06/30/2022), Proteinuria, unspecified (09/20/2022), Restless legs syndrome (12/01/2022), and Vitamin D deficiency, unspecified (09/01/2022).    Surgical History  She has a past surgical history that includes Other surgical history (04/04/2019); Other surgical history (04/04/2019); Other surgical history (04/04/2019); Other surgical history (04/04/2019); Other surgical history (07/01/2021); Other surgical history (07/01/2021); and Other surgical history (07/01/2021).     Social " History  She reports that she has never smoked. She has never been exposed to tobacco smoke. She has never used smokeless tobacco. She reports that she does not drink alcohol and does not use drugs.    Family History  Family History   Problem Relation Name Age of Onset   • Diabetes Mother     • COPD Mother     • Thyroid disease Mother         Medications  Current Outpatient Medications   Medication Instructions   • acetaminophen (Tylenol) 500 mg tablet oral   • amLODIPine (NORVASC) 10 mg, oral, Daily   • gabapentin (NEURONTIN) 200 mg, oral, Nightly   • glimepiride (AMARYL) 2 mg, oral, Daily with evening meal   • losartan (Cozaar) 100 mg tablet TAKE 1 TABLET BY MOUTH DAILY   • naproxen (NAPROSYN) 250 mg, oral, 2 times daily with meals   • omeprazole OTC (PRILOSEC OTC) 20 mg, oral, Daily before breakfast, Do not crush, chew, or split.   • OneTouch Delica Plus Lancet 33 gauge misc    • OneTouch Verio Reflect Meter misc    • OneTouch Verio test strips strip    • rOPINIRole (REQUIP) 1 mg, oral, Nightly   • tiZANidine (Zanaflex) 2 mg tablet Take 1 tablet by mouth once daily if needed       Allergies  Metoprolol, Ace inhibitors, Atorvastatin, Atorvastatin calcium, Metformin, Pseudoephedrine hcl, Triamterene-hydrochlorothiazid, and Erythromycin    Review of Systems   Constitutional:  Negative for appetite change and fever.   HENT:          Dry mouth   Eyes:  Negative for visual disturbance.   Respiratory:  Negative for cough and shortness of breath.    Cardiovascular:  Negative for chest pain.   Gastrointestinal:  Negative for abdominal pain, constipation, diarrhea, nausea and vomiting.   Endocrine: Negative for polydipsia and polyuria.   Genitourinary:  Negative for frequency.   Musculoskeletal:  Positive for arthralgias, back pain and myalgias.   Skin:  Negative for rash.   Neurological:  Negative for headaches.   Psychiatric/Behavioral:  The patient is not nervous/anxious.          Last Recorded Vitals  Blood pressure  (!) 149/104, pulse 74, weight 136 kg (300 lb 4.8 oz).    Physical Exam  Constitutional:       General: She is not in acute distress.     Appearance: She is obese.   HENT:      Head: Normocephalic.      Mouth/Throat:      Mouth: Mucous membranes are moist.   Eyes:      Extraocular Movements: Extraocular movements intact.   Neck:      Thyroid: No thyromegaly.   Cardiovascular:      Pulses:           Radial pulses are 2+ on the right side and 2+ on the left side.        Dorsalis pedis pulses are 2+ on the right side and 2+ on the left side.   Musculoskeletal:      Right foot: Deformity (flat) present.      Left foot: Deformity (flat) present.   Lymphadenopathy:      Cervical: No cervical adenopathy.   Skin:     Comments: No foot sores   Neurological:      Mental Status: She is alert.      Motor: No tremor.   Psychiatric:         Mood and Affect: Affect normal.          Relevant Results  Glucose (mg/dL)   Date Value   03/09/2024 144 (H)   03/01/2023 160 (H)   09/03/2022 168 (H)   03/02/2022 151 (H)     Hemoglobin A1C (%)   Date Value   03/09/2024 7.4 (H)   07/08/2023 7.6 (A)   03/01/2023 7.9 (A)   09/03/2022 7.8 (A)     Bicarbonate (mmol/L)   Date Value   03/09/2024 23   03/01/2023 22   09/03/2022 24   03/02/2022 27     Urea Nitrogen (mg/dL)   Date Value   03/09/2024 16   03/01/2023 22   09/03/2022 20   03/02/2022 11     Creatinine (mg/dL)   Date Value   03/09/2024 0.56   03/01/2023 0.78   09/03/2022 0.67   03/02/2022 0.61     Lab Results   Component Value Date    CHOL 207 (H) 03/09/2024    CHOL 233 (H) 09/03/2022    CHOL 217 (H) 09/25/2021     Lab Results   Component Value Date    HDL 39.1 03/09/2024    HDL 49.0 09/03/2022    HDL 46.0 09/25/2021     Lab Results   Component Value Date    LDLCALC 125 (H) 03/09/2024    LDLCALC 130 09/06/2018    LDLCALC 129 01/09/2018     Lab Results   Component Value Date    TRIG 215 (H) 03/09/2024    TRIG 231 (H) 09/03/2022    TRIG 208 (H) 09/25/2021     Lab Results   Component Value  Date    TSH 2.71 03/09/2024      Latest Reference Range & Units 03/09/24 09:39   Albumin, Urine Random Not established mg/L 524.0   Creatinine, Urine Random 20.0 - 320.0 mg/dL 73.7   Albumin/Creatine Ratio <30.0 ug/mg Creat 711.0 (H)       IMPRESSION  TYPE 2 DIABETES MELLITUS  Suboptimal control but not uncontrolled  Complicated by albuminuria  Discussed insulin resistance and insulin deficiency in type 2 diabetes.  Discussed symptoms of uncontrolled hyperglycemia.  Discussed long term complications of diabetes to eyes, kidneys and nerves.  Discussed increased risk of coronary and vascular disease.    HYPERLIPIDEMIA  Refuses statin  Discussed rationale for statin to decrease cardiac risk      RECOMMENDATIONS  Physical activity 30 min 5 days per week  Decrease sugars and starches in diet    Mounjaro 2.5 mg every 7 days for 4 weeks, then increase to 5 mg every 7 days  When you increase to 5 mg, stop glimepiride    If Mounjaro not covered will request OZEMPIC, inject 0.25 mg once every 7 days for 4 weeks.  After 4 weeks, increase to 0.5 mg every 7 days.    Discussed GI side effects.  Discussed association with pancreatitis and, in rodents, with thyroid c-cell tumors.    Discussed option of SGLT2-inhibitor    Follow up 3-4 months.

## 2024-04-01 NOTE — PROGRESS NOTES
"History Of Present Illness  Lindsey Herrera \"Marcela\" is a 63 y.o. female     Duration of type 2 diabetes mellitus:  8 years  Complications:  Albuminuria    Diabetes education several years ago.     Lumbar steroid injections three times in the past year, last to right S-I joint 3-4 weeks ago.    Glimepiride 2 mg in the evening, taking only 4 days per week    History of diarrhea on metformin.     Patient is testing glucose less than daily  Records not provided    Last eye exam:  unknown    Hyperlipidemia  She refuses statins    Past Medical History  She has a past medical history of Essential (primary) hypertension (09/20/2022), Gastro-esophageal reflux disease without esophagitis (03/02/2022), Hyperglycemia, unspecified (06/30/2021), Hyperlipidemia, unspecified (09/20/2022), Other muscle spasm (12/01/2022), Presence of right artificial hip joint (06/30/2022), Proteinuria, unspecified (09/20/2022), Restless legs syndrome (12/01/2022), and Vitamin D deficiency, unspecified (09/01/2022).    Surgical History  She has a past surgical history that includes Other surgical history (04/04/2019); Other surgical history (04/04/2019); Other surgical history (04/04/2019); Other surgical history (04/04/2019); Other surgical history (07/01/2021); Other surgical history (07/01/2021); and Other surgical history (07/01/2021).     Social History  She reports that she has never smoked. She has never been exposed to tobacco smoke. She has never used smokeless tobacco. She reports that she does not drink alcohol and does not use drugs.    Family History  Family History   Problem Relation Name Age of Onset    Diabetes Mother      COPD Mother      Thyroid disease Mother         Medications  Current Outpatient Medications   Medication Instructions    acetaminophen (Tylenol) 500 mg tablet oral    amLODIPine (NORVASC) 10 mg, oral, Daily    gabapentin (NEURONTIN) 200 mg, oral, Nightly    glimepiride (AMARYL) 2 mg, oral, Daily with evening meal    " losartan (Cozaar) 100 mg tablet TAKE 1 TABLET BY MOUTH DAILY    naproxen (NAPROSYN) 250 mg, oral, 2 times daily with meals    omeprazole OTC (PRILOSEC OTC) 20 mg, oral, Daily before breakfast, Do not crush, chew, or split.    OneTouch Delica Plus Lancet 33 gauge misc     OneTouch Verio Reflect Meter [place]     OneTouch Verio test strips strip     rOPINIRole (REQUIP) 1 mg, oral, Nightly    tiZANidine (Zanaflex) 2 mg tablet Take 1 tablet by mouth once daily if needed       Allergies  Metoprolol, Ace inhibitors, Atorvastatin, Atorvastatin calcium, Metformin, Pseudoephedrine hcl, Triamterene-hydrochlorothiazid, and Erythromycin    Review of Systems   Constitutional:  Negative for appetite change and fever.   HENT:          Dry mouth   Eyes:  Negative for visual disturbance.   Respiratory:  Negative for cough and shortness of breath.    Cardiovascular:  Negative for chest pain.   Gastrointestinal:  Negative for abdominal pain, constipation, diarrhea, nausea and vomiting.   Endocrine: Negative for polydipsia and polyuria.   Genitourinary:  Negative for frequency.   Musculoskeletal:  Positive for arthralgias, back pain and myalgias.   Skin:  Negative for rash.   Neurological:  Negative for headaches.   Psychiatric/Behavioral:  The patient is not nervous/anxious.          Last Recorded Vitals  Blood pressure (!) 149/104, pulse 74, weight 136 kg (300 lb 4.8 oz).    Physical Exam  Constitutional:       General: She is not in acute distress.     Appearance: She is obese.   HENT:      Head: Normocephalic.      Mouth/Throat:      Mouth: Mucous membranes are moist.   Eyes:      Extraocular Movements: Extraocular movements intact.   Neck:      Thyroid: No thyromegaly.   Cardiovascular:      Pulses:           Radial pulses are 2+ on the right side and 2+ on the left side.        Dorsalis pedis pulses are 2+ on the right side and 2+ on the left side.   Musculoskeletal:      Right foot: Deformity (flat) present.      Left foot:  Deformity (flat) present.   Lymphadenopathy:      Cervical: No cervical adenopathy.   Skin:     Comments: No foot sores   Neurological:      Mental Status: She is alert.      Motor: No tremor.   Psychiatric:         Mood and Affect: Affect normal.          Relevant Results  Glucose (mg/dL)   Date Value   03/09/2024 144 (H)   03/01/2023 160 (H)   09/03/2022 168 (H)   03/02/2022 151 (H)     Hemoglobin A1C (%)   Date Value   03/09/2024 7.4 (H)   07/08/2023 7.6 (A)   03/01/2023 7.9 (A)   09/03/2022 7.8 (A)     Bicarbonate (mmol/L)   Date Value   03/09/2024 23   03/01/2023 22   09/03/2022 24   03/02/2022 27     Urea Nitrogen (mg/dL)   Date Value   03/09/2024 16   03/01/2023 22   09/03/2022 20   03/02/2022 11     Creatinine (mg/dL)   Date Value   03/09/2024 0.56   03/01/2023 0.78   09/03/2022 0.67   03/02/2022 0.61     Lab Results   Component Value Date    CHOL 207 (H) 03/09/2024    CHOL 233 (H) 09/03/2022    CHOL 217 (H) 09/25/2021     Lab Results   Component Value Date    HDL 39.1 03/09/2024    HDL 49.0 09/03/2022    HDL 46.0 09/25/2021     Lab Results   Component Value Date    LDLCALC 125 (H) 03/09/2024    LDLCALC 130 09/06/2018    LDLCALC 129 01/09/2018     Lab Results   Component Value Date    TRIG 215 (H) 03/09/2024    TRIG 231 (H) 09/03/2022    TRIG 208 (H) 09/25/2021     Lab Results   Component Value Date    TSH 2.71 03/09/2024      Latest Reference Range & Units 03/09/24 09:39   Albumin, Urine Random Not established mg/L 524.0   Creatinine, Urine Random 20.0 - 320.0 mg/dL 73.7   Albumin/Creatine Ratio <30.0 ug/mg Creat 711.0 (H)       IMPRESSION  TYPE 2 DIABETES MELLITUS  Suboptimal control but not uncontrolled  Complicated by albuminuria  Discussed insulin resistance and insulin deficiency in type 2 diabetes.  Discussed symptoms of uncontrolled hyperglycemia.  Discussed long term complications of diabetes to eyes, kidneys and nerves.  Discussed increased risk of coronary and vascular  disease.    HYPERLIPIDEMIA  Refuses statin  Discussed rationale for statin to decrease cardiac risk      RECOMMENDATIONS  Physical activity 30 min 5 days per week  Decrease sugars and starches in diet    Mounjaro 2.5 mg every 7 days for 4 weeks, then increase to 5 mg every 7 days  When you increase to 5 mg, stop glimepiride    If Mounjaro not covered will request OZEMPIC, inject 0.25 mg once every 7 days for 4 weeks.  After 4 weeks, increase to 0.5 mg every 7 days.    Discussed GI side effects.  Discussed association with pancreatitis and, in rodents, with thyroid c-cell tumors.    Discussed option of SGLT2-inhibitor    Follow up 3-4 months.

## 2024-04-03 ENCOUNTER — PHARMACY VISIT (OUTPATIENT)
Dept: PHARMACY | Facility: CLINIC | Age: 64
End: 2024-04-03
Payer: COMMERCIAL

## 2024-04-03 PROCEDURE — RXMED WILLOW AMBULATORY MEDICATION CHARGE

## 2024-04-08 ENCOUNTER — APPOINTMENT (OUTPATIENT)
Dept: ENDOCRINOLOGY | Facility: CLINIC | Age: 64
End: 2024-04-08
Payer: COMMERCIAL

## 2024-04-18 ENCOUNTER — OFFICE VISIT (OUTPATIENT)
Dept: PRIMARY CARE | Facility: CLINIC | Age: 64
End: 2024-04-18
Payer: COMMERCIAL

## 2024-04-18 VITALS
BODY MASS INDEX: 47.11 KG/M2 | OXYGEN SATURATION: 94 % | DIASTOLIC BLOOD PRESSURE: 82 MMHG | WEIGHT: 293 LBS | SYSTOLIC BLOOD PRESSURE: 146 MMHG | HEART RATE: 112 BPM

## 2024-04-18 DIAGNOSIS — E78.00 HYPERCHOLESTEREMIA: ICD-10-CM

## 2024-04-18 DIAGNOSIS — I10 HYPERTENSION, UNSPECIFIED TYPE: Primary | ICD-10-CM

## 2024-04-18 DIAGNOSIS — E66.01 CLASS 3 SEVERE OBESITY DUE TO EXCESS CALORIES WITH SERIOUS COMORBIDITY AND BODY MASS INDEX (BMI) OF 45.0 TO 49.9 IN ADULT (MULTI): ICD-10-CM

## 2024-04-18 DIAGNOSIS — M48.062 LUMBAR STENOSIS WITH NEUROGENIC CLAUDICATION: ICD-10-CM

## 2024-04-18 DIAGNOSIS — Z53.20 STATIN DECLINED: ICD-10-CM

## 2024-04-18 DIAGNOSIS — G25.81 RESTLESS LEG SYNDROME: ICD-10-CM

## 2024-04-18 DIAGNOSIS — E11.69 TYPE 2 DIABETES MELLITUS WITH OTHER SPECIFIED COMPLICATION, WITHOUT LONG-TERM CURRENT USE OF INSULIN (MULTI): ICD-10-CM

## 2024-04-18 DIAGNOSIS — K44.9 HIATAL HERNIA: ICD-10-CM

## 2024-04-18 PROBLEM — R25.2 SPASM: Status: ACTIVE | Noted: 2023-04-10

## 2024-04-18 PROCEDURE — 3079F DIAST BP 80-89 MM HG: CPT | Performed by: INTERNAL MEDICINE

## 2024-04-18 PROCEDURE — 1036F TOBACCO NON-USER: CPT | Performed by: INTERNAL MEDICINE

## 2024-04-18 PROCEDURE — 99214 OFFICE O/P EST MOD 30 MIN: CPT | Performed by: INTERNAL MEDICINE

## 2024-04-18 PROCEDURE — 3062F POS MACROALBUMINURIA REV: CPT | Performed by: INTERNAL MEDICINE

## 2024-04-18 PROCEDURE — RXMED WILLOW AMBULATORY MEDICATION CHARGE

## 2024-04-18 PROCEDURE — 3008F BODY MASS INDEX DOCD: CPT | Performed by: INTERNAL MEDICINE

## 2024-04-18 PROCEDURE — 3077F SYST BP >= 140 MM HG: CPT | Performed by: INTERNAL MEDICINE

## 2024-04-18 PROCEDURE — 3049F LDL-C 100-129 MG/DL: CPT | Performed by: INTERNAL MEDICINE

## 2024-04-18 PROCEDURE — 4010F ACE/ARB THERAPY RXD/TAKEN: CPT | Performed by: INTERNAL MEDICINE

## 2024-04-18 PROCEDURE — 3051F HG A1C>EQUAL 7.0%<8.0%: CPT | Performed by: INTERNAL MEDICINE

## 2024-04-18 RX ORDER — AMLODIPINE BESYLATE 10 MG/1
10 TABLET ORAL DAILY
Qty: 90 TABLET | Refills: 0 | Status: SHIPPED | OUTPATIENT
Start: 2024-04-18

## 2024-04-18 RX ORDER — ROPINIROLE 1 MG/1
1 TABLET, FILM COATED ORAL NIGHTLY
Qty: 90 TABLET | Refills: 0 | Status: SHIPPED | OUTPATIENT
Start: 2024-04-18

## 2024-04-18 RX ORDER — LOSARTAN POTASSIUM 100 MG/1
TABLET ORAL
Qty: 90 TABLET | Refills: 0 | Status: SHIPPED | OUTPATIENT
Start: 2024-04-18

## 2024-04-18 NOTE — PROGRESS NOTES
"Subjective   Patient ID: Lindsey Herrera \"Marcela\" is a 63 y.o. female who presents for Follow-up (6 weeks ).  HPI  Follow up bp    Hypertension better on therapy no side effects  On losartan 100 mg daily  Continue norvasc  follow    Hiatal hernia   diet discussed   Using otc daily     Hypercholesterolemia patient stopped statin therapy \"felt she didn't need it\"  Cardio will recheck in October / not done  Labs and cardio follow up cancelled by pt / not rescheduled     CAD by elevated CT cardiac score patient on aspirin.    Cardiology following but pt cancelled follow up  Pt aware of risks including death     DM A1c 7.4   3-24 patient still refuses medication risk / benefits reviewed.   FBS not checked either  Aware of risks including but not limited to  MI, stroke, blindness, renal failure and death  GI side effects from mounjaro  Endo following     Chronic back pain  Pain mgmt following  Injection pending    RLS  on rx no side effects     Dense breast tissue on mammogram patient not interested in MRI of the breast.     Hypercalcemia improved continue to follow     Abnormal AST and ALT improved continue to follow     Diet and exercise reviewed     GYN, colonoscopy, mammogram and dexa  Pt declined at present     Immunizations rev'd    pt declined  Risks rev'd including death / pt understood       Review of Systems   All other systems reviewed and are negative.      Objective   /82   Pulse (!) 112   Wt 136 kg (300 lb 12.8 oz)   SpO2 94%   BMI 47.11 kg/m²   Lab Results   Component Value Date    WBC 7.4 03/09/2024    HGB 14.2 03/09/2024    HCT 43.9 03/09/2024     03/09/2024    CHOL 207 (H) 03/09/2024    TRIG 215 (H) 03/09/2024    HDL 39.1 03/09/2024    ALT 43 03/09/2024    AST 39 03/09/2024     03/09/2024    K 3.9 03/09/2024     03/09/2024    CREATININE 0.56 03/09/2024    BUN 16 03/09/2024    CO2 23 03/09/2024    TSH 2.71 03/09/2024    HGBA1C 7.4 (H) 03/09/2024           Physical Exam  Vitals " reviewed.   Constitutional:       Appearance: Normal appearance. She is obese.   HENT:      Head: Normocephalic and atraumatic.      Mouth/Throat:      Pharynx: No posterior oropharyngeal erythema.   Eyes:      General: No scleral icterus.     Conjunctiva/sclera: Conjunctivae normal.      Pupils: Pupils are equal, round, and reactive to light.   Cardiovascular:      Rate and Rhythm: Normal rate and regular rhythm.      Heart sounds: Normal heart sounds.      Comments: 110/83  at home  Pulmonary:      Effort: No respiratory distress.      Breath sounds: No wheezing.   Abdominal:      General: Abdomen is flat. Bowel sounds are normal. There is no distension.      Palpations: Abdomen is soft. There is no mass.      Tenderness: There is no abdominal tenderness. There is no rebound.   Musculoskeletal:         General: Normal range of motion.      Cervical back: Normal range of motion and neck supple.   Skin:     General: Skin is warm and dry.   Neurological:      General: No focal deficit present.      Mental Status: She is alert and oriented to person, place, and time. Mental status is at baseline.   Psychiatric:         Mood and Affect: Mood normal.         Behavior: Behavior normal.         Thought Content: Thought content normal.         Judgment: Judgment normal.         Problem List Items Addressed This Visit             ICD-10-CM    DM (diabetes mellitus), type 2 (Multi) E11.9    Hiatal hernia K44.9    Class 3 severe obesity due to excess calories with serious comorbidity and body mass index (BMI) of 45.0 to 49.9 in adult (Multi) E66.01, Z68.42    Lumbar stenosis with neurogenic claudication M48.062     Other Visit Diagnoses         Codes    Hypertension, unspecified type    -  Primary I10    Relevant Medications    losartan (Cozaar) 100 mg tablet    amLODIPine (Norvasc) 10 mg tablet    Hypercholesteremia     E78.00    Statin declined     Z53.20    Restless leg syndrome     G25.81    Relevant Medications     "rOPINIRole (Requip) 1 mg tablet          Assessment/Plan   Follow up bp    Hypertension better on therapy no side effects  increase losartan 100 mg daily  Continue norvasc  follow    Hiatal hernia   diet discussed   Using otc daily     Hypercholesterolemia patient stopped statin therapy \"felt she didn't need it\"  Cardio will recheck in October / not done  Labs and cardio follow up cancelled by pt / not rescheduled     CAD by elevated CT cardiac score patient on aspirin.    Cardiology following but pt cancelled follow up  Pt aware of risks including death     DM A1c 7.4   3-24 patient still refuses medication risk / benefits reviewed.   FBS not checked either  Aware of risks including but not limited to  MI, stroke, blindness, renal failure and death  GI side effects from mounjaro  Endo following     Chronic back pain  Pain mgmt following  Injection pending    RLS  on rx no side effects     Dense breast tissue on mammogram patient not interested in MRI of the breast.     Hypercalcemia improved continue to follow     Abnormal AST and ALT improved continue to follow     Diet and exercise reviewed     GYN, colonoscopy, mammogram and dexa  Pt declined at present     Immunizations rev'd    pt declined  Risks rev'd including death / pt understood    Mammogram 11-22  Dexa none  Colonoscopy 17  was to recheck 3-5 yrs / past due  CT chest lung cancer screening n/a  GYN past due  immunizations rev'd  BMI 47.1    Follow up 3 months  "

## 2024-04-19 ENCOUNTER — PHARMACY VISIT (OUTPATIENT)
Dept: PHARMACY | Facility: CLINIC | Age: 64
End: 2024-04-19
Payer: COMMERCIAL

## 2024-04-26 PROCEDURE — RXMED WILLOW AMBULATORY MEDICATION CHARGE

## 2024-04-29 ENCOUNTER — PHARMACY VISIT (OUTPATIENT)
Dept: PHARMACY | Facility: CLINIC | Age: 64
End: 2024-04-29
Payer: COMMERCIAL

## 2024-05-08 PROCEDURE — RXMED WILLOW AMBULATORY MEDICATION CHARGE

## 2024-05-09 ENCOUNTER — PHARMACY VISIT (OUTPATIENT)
Dept: PHARMACY | Facility: CLINIC | Age: 64
End: 2024-05-09
Payer: COMMERCIAL

## 2024-05-10 ENCOUNTER — PHARMACY VISIT (OUTPATIENT)
Dept: PHARMACY | Facility: CLINIC | Age: 64
End: 2024-05-10
Payer: COMMERCIAL

## 2024-05-10 ENCOUNTER — OFFICE VISIT (OUTPATIENT)
Dept: PAIN MEDICINE | Facility: HOSPITAL | Age: 64
End: 2024-05-10
Payer: COMMERCIAL

## 2024-05-10 VITALS
HEART RATE: 73 BPM | TEMPERATURE: 97.9 F | DIASTOLIC BLOOD PRESSURE: 105 MMHG | SYSTOLIC BLOOD PRESSURE: 170 MMHG | BODY MASS INDEX: 31.86 KG/M2 | HEIGHT: 67 IN | WEIGHT: 203 LBS

## 2024-05-10 DIAGNOSIS — Z79.899 MEDICATION MANAGEMENT: ICD-10-CM

## 2024-05-10 DIAGNOSIS — M54.16 CHRONIC LUMBAR RADICULOPATHY: Primary | ICD-10-CM

## 2024-05-10 DIAGNOSIS — M48.062 LUMBAR STENOSIS WITH NEUROGENIC CLAUDICATION: ICD-10-CM

## 2024-05-10 DIAGNOSIS — M47.817 FACET ARTHROPATHY, LUMBOSACRAL: ICD-10-CM

## 2024-05-10 LAB
AMPHETAMINES UR QL SCN: NORMAL
BARBITURATES UR QL SCN: NORMAL
BZE UR QL SCN: NORMAL
CANNABINOIDS UR QL SCN: NORMAL
CREAT UR-MCNC: 33.7 MG/DL (ref 20–320)
PCP UR QL SCN: NORMAL

## 2024-05-10 PROCEDURE — 80355 GABAPENTIN NON-BLOOD: CPT | Performed by: NURSE PRACTITIONER

## 2024-05-10 PROCEDURE — 82570 ASSAY OF URINE CREATININE: CPT | Mod: 59 | Performed by: NURSE PRACTITIONER

## 2024-05-10 PROCEDURE — 99214 OFFICE O/P EST MOD 30 MIN: CPT | Performed by: NURSE PRACTITIONER

## 2024-05-10 PROCEDURE — RXMED WILLOW AMBULATORY MEDICATION CHARGE

## 2024-05-10 PROCEDURE — 80346 BENZODIAZEPINES1-12: CPT | Performed by: NURSE PRACTITIONER

## 2024-05-10 RX ORDER — TOPIRAMATE 25 MG/1
25 TABLET ORAL NIGHTLY
Qty: 30 TABLET | Refills: 0 | Status: SHIPPED | OUTPATIENT
Start: 2024-05-10 | End: 2024-06-09

## 2024-05-10 ASSESSMENT — ENCOUNTER SYMPTOMS
ALLERGIC/IMMUNOLOGIC NEGATIVE: 1
GASTROINTESTINAL NEGATIVE: 1
RESPIRATORY NEGATIVE: 1
CARDIOVASCULAR NEGATIVE: 1
MYALGIAS: 1
ARTHRALGIAS: 1
PSYCHIATRIC NEGATIVE: 1
NECK STIFFNESS: 0
CONSTITUTIONAL NEGATIVE: 1
NECK PAIN: 0
EYES NEGATIVE: 1
NEUROLOGICAL NEGATIVE: 1
BACK PAIN: 1
JOINT SWELLING: 0
ENDOCRINE NEGATIVE: 1

## 2024-05-10 ASSESSMENT — PAIN SCALES - GENERAL: PAINLEVEL: 6

## 2024-05-10 NOTE — PROGRESS NOTES
"Subjective   Patient ID: Lindsey Herrera \"Marcela\" is a 63 y.o. female who presents for Follow-up (Post procedure follow up).    Lindsey is a pleasant 63-year-old  female who is here for postprocedure follow-up.  Patient is ambulatory.  Gait is steady.  She arrives by herself.    Patient had left SI joint injection done on 3/12/2024.  The injection has improved her back pain.  It provided 50% relief that lasted for over a month.  She had the most relief the first several weeks..  The injection has improved some of her pain and her ability to participate in her daily activities.    Patient continues to have chronic lower back pain that goes down to her left leg.  She rates her pain a 6 out of 10.  Pain comes and goes but can be constant depending on her activities.  She describes it as aching, sharp, shooting and stabbing kind of pain.  She has pain that goes down to the back of her leg.  It affected her posterior thigh and pain is constant.  She reports she even had a new chair at work to relieve the discomfort.  She mentioned that on occasion the pain shoots all the way down to her foot when she was sitting.  She has no pain, numbness or tingling sensation to her right leg.  She denies leg weakness or change in balance.  She denies bowel or bladder incontinence.  She denies recent falls, injuries, or ER visits.  Has been no changes since she was last seen.    Patient takes Tylenol arthritis as needed for pain relief and on occasion she takes naproxen.  She is taking tizanidine as needed.  She is taking gabapentin 100 mg.  She reports she tried to increase it to 200 mg and she had a reaction to this.  She got so irritable.  Patient had physical therapy in the past and is continuing her home stretching exercise with minimal effect to her left leg.    Patient had MRI of her lumbar spine that was done on 2022 and I reviewed the results.  I discussed the plan of care including pharmacologic and joint interventional " procedure.  I discussed transforaminal GLENYS and she is in agreement to proceed to this procedure.  This is done under fluoroscopy.  Questions were answered during this encounter.    ---------------  3/12/2024: Left SI injection  12/19/2023: Lumbar epidural block #2  10/24/2023: Lumbar epidural block #1  -------------            OARRS:  Jocelyn Quiñones, APRN-CNP, DNP on 5/10/2024 10:33 AM  I have personally reviewed the OARRS report for Lindsey Herrera. I have considered the risks of abuse, dependence, addiction and diversion    Past Medical History  She has a past medical history of Essential (primary) hypertension (09/20/2022), Gastro-esophageal reflux disease without esophagitis (03/02/2022), Hyperglycemia, unspecified (06/30/2021), Hyperlipidemia, unspecified (09/20/2022), Other muscle spasm (12/01/2022), Presence of right artificial hip joint (06/30/2022), Proteinuria, unspecified (09/20/2022), Restless legs syndrome (12/01/2022), and Vitamin D deficiency, unspecified (09/01/2022).    Surgical History  Past Surgical History:   Procedure Laterality Date    OTHER SURGICAL HISTORY  04/04/2019    Cholecystectomy    OTHER SURGICAL HISTORY  04/04/2019    Tubal ligation    OTHER SURGICAL HISTORY  04/04/2019    Hip replacement    OTHER SURGICAL HISTORY  04/04/2019    Tonsillectomy    OTHER SURGICAL HISTORY  07/01/2021    Complete colonoscopy    OTHER SURGICAL HISTORY  07/01/2021    Complete colonoscopy    OTHER SURGICAL HISTORY  07/01/2021    Complete colonoscopy        Social History  She reports that she has never smoked. She has never been exposed to tobacco smoke. She has never used smokeless tobacco. She reports that she does not drink alcohol and does not use drugs.    Family History  Family History   Problem Relation Name Age of Onset    Diabetes Mother      COPD Mother      Thyroid disease Mother          Allergies  Metoprolol, Ace inhibitors, Atorvastatin, Atorvastatin calcium, Metformin, Pseudoephedrine hcl,  Triamterene-hydrochlorothiazid, and Erythromycin      Current Outpatient Medications:     acetaminophen (Tylenol) 500 mg tablet, Take by mouth., Disp: , Rfl:     amLODIPine (Norvasc) 10 mg tablet, Take 1 tablet (10 mg) by mouth once daily., Disp: 90 tablet, Rfl: 0    losartan (Cozaar) 100 mg tablet, TAKE 1 TABLET BY MOUTH DAILY, Disp: 90 tablet, Rfl: 0    naproxen (Naprosyn) 250 mg tablet, Take 1 tablet (250 mg) by mouth 2 times a day with meals., Disp: , Rfl:     omeprazole OTC (PriLOSEC OTC) 20 mg EC tablet, Take 1 tablet (20 mg) by mouth once daily in the morning. Take before meals. Do not crush, chew, or split., Disp: , Rfl:     [facility]Touch [facility] Plus Lancet 33 [facility], For glucose testing once daily, Disp: 100 each, Rfl: 3    OneTouch Verio Reflect Meter misc, , Disp: , Rfl:     OneTouch Verio test strips strip, For glucose testing once daily, Disp: 100 strip, Rfl: 3    rOPINIRole (Requip) 1 mg tablet, Take 1 tablet (1 mg) by mouth once daily at bedtime., Disp: 90 tablet, Rfl: 0    tirzepatide (Mounjaro) 5 mg/0.5 mL pen injector, Inject 5 mg under the skin every 7 days., Disp: 2 mL, Rfl: 10    tiZANidine (Zanaflex) 2 mg tablet, Take 1 tablet by mouth once daily if needed, Disp: 90 tablet, Rfl: 1    glimepiride (AmaryL) 2 mg tablet, Take 1 tablet (2 mg) by mouth once daily in the morning. Take before meals., Disp: 30 tablet, Rfl: 2    topiramate (Topamax) 25 mg tablet, Take 1 tablet (25 mg) by mouth once daily at bedtime., Disp: 30 tablet, Rfl: 0     Review of Systems   Constitutional: Negative.    HENT: Negative.     Eyes: Negative.    Respiratory: Negative.     Cardiovascular: Negative.    Gastrointestinal: Negative.    Endocrine: Negative.    Genitourinary: Negative.    Musculoskeletal:  Positive for arthralgias, back pain and myalgias. Negative for gait problem, joint swelling, neck pain and neck stiffness.   Skin: Negative.    Allergic/Immunologic: Negative.    Neurological: Negative.     Psychiatric/Behavioral: Negative.          Physical Exam  Vitals and nursing note reviewed.   HENT:      Head: Normocephalic.      Nose: Nose normal.   Eyes:      Extraocular Movements: Extraocular movements intact.      Conjunctiva/sclera: Conjunctivae normal.      Pupils: Pupils are equal, round, and reactive to light.   Cardiovascular:      Rate and Rhythm: Normal rate and regular rhythm.   Pulmonary:      Effort: Pulmonary effort is normal.      Breath sounds: Normal breath sounds.   Musculoskeletal:         General: Tenderness present. No swelling, deformity or signs of injury.      Cervical back: No rigidity or tenderness.      Lumbar back: Tenderness present.      Right lower leg: No edema.      Left lower leg: No edema.      Comments: Positive left leg raise eliciting back pain.  Positive left Fabere's test eliciting back pain.  Positive for minimal left SI joint pain on palpation.  Positive for paraspinal tenderness at the lumbar region bilaterally at L4-L5, L5-S1 with rotation with left-sided radicular symptoms that follows this pathway.  BUE 5/5, RLE 5/5, LLE 4-5/5.   Skin:     General: Skin is warm and dry.   Neurological:      General: No focal deficit present.      Mental Status: She is alert and oriented to person, place, and time.   Psychiatric:         Mood and Affect: Mood normal.         Behavior: Behavior normal.          Last Recorded Vitals  BP (!) 170/105   Pulse 73   Temp 36.6 °C (97.9 °F) (Temporal)   Wt 92.1 kg (203 lb)     Relevant Results      Pain Management Panel  More data may exist         Latest Ref Rng & Units 12/1/2023 8/28/2023   Pain Management Panel   Amphetamine Screen, Urine Presumptive Negative Presumptive Negative  PRESUMPTIVE NEGATIVE    Barbiturate Screen, Urine Presumptive Negative Presumptive Negative  PRESUMPTIVE NEGATIVE    Benzodiazepines Screen, Urine Presumptive Negative Presumptive Negative  -   Fentanyl Screen, Urine Presumptive Negative Presumptive Negative   PRESUMPTIVE NEGATIVE         -----------------  Narrative & Impression   MRN: [id number]  Patient Name: EARLINE ADHIKARI     STUDY:  MRI L-SPINE WO;  7/28/2022 4:26 pm     INDICATION:  back and right hip pain  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     COMPARISON:  None.     ACCESSION NUMBER(S):  [id number]     ORDERING CLINICIAN:  MAKENZIE DE     TECHNIQUE:  The lumbar spine was studied in the sagital, axial and coronal planes  utiliing T1 and T2 weighted images.     FINDINGS:  The marrow signal and vertebral body height are normal. The conus and  sacrum are normal.  Images at each interspace reveal the following:  L1/L2  There is normal alignment and vertebral body height. The disc space  is normal. There is no evidence of canal or foraminal narrowing.  There is no evidence of bulging or herniated disc.  L2/L3  There is normal alignment and vertebral body height. The disc space  is normal. There is no evidence of canal or foraminal narrowing.  There is no evidence of bulging or herniated disc.  L3/L4  Mild bilateral facet hypertrophy without canal or foraminal narrowing  L4/L5  Trace spondylolisthesis and pseudo bulging intervertebral disc.  Bilateral facet hypertrophy. Flattening of the thecal sac which  measures approximately 7 mm in AP dimension in the midline. Mild  bilateral foraminal narrowing.  L5/S1  Bilateral facet hypertrophy with mild foraminal narrowing. No  measurable canal stenosis.        IMPRESSION:  * Mild lumbar spondylosis as described   ----------------        Assessment/Plan   Problem List Items Addressed This Visit             ICD-10-CM    Facet arthropathy, lumbosacral M47.817    Relevant Medications    topiramate (Topamax) 25 mg tablet    Chronic lumbar radiculopathy - Primary M54.16    Relevant Medications    topiramate (Topamax) 25 mg tablet    Other Relevant Orders    FL fluoro images no charge    Transforaminal    Lumbar stenosis with neurogenic  claudication M48.062    Relevant Medications    topiramate (Topamax) 25 mg tablet    Other Relevant Orders    FL fluoro images no charge    Transforaminal     Other Visit Diagnoses         Codes    Medication management     Z79.899    Relevant Orders    Opiate/Opioid/Benzo Prescription Compliance    Gabapentin,Urine    OOB Internal Tracking                   1. Chronic lumbar radiculopathy  - topiramate (Topamax) 25 mg tablet; Take 1 tablet (25 mg) by mouth once daily at bedtime.  Dispense: 30 tablet; Refill: 0  - FL fluoro images no charge; Future  - Transforaminal; Future    2. Lumbar stenosis with neurogenic claudication  - topiramate (Topamax) 25 mg tablet; Take 1 tablet (25 mg) by mouth once daily at bedtime.  Dispense: 30 tablet; Refill: 0  - FL fluoro images no charge; Future  - Transforaminal; Future    3. Facet arthropathy, lumbosacral  - topiramate (Topamax) 25 mg tablet; Take 1 tablet (25 mg) by mouth once daily at bedtime.  Dispense: 30 tablet; Refill: 0    4. Medication management  - Opiate/Opioid/Benzo Prescription Compliance; Future  - Gabapentin,Urine; Future  - Opiate/Opioid/Benzo Prescription Compliance  - Gabapentin,Urine  - OOB Internal Tracking       Plan/Follow-up Instructions:     You may stop taking your gabapentin.  I started you on topiramate and you may take 25 mg at bedtime.    Continue taking naproxen or Tylenol arthritis as needed for pain relief.    You are scheduled for left L4-L5 and L5-S1 transforaminal epidural steroid injection on 6/4/2024 in [place] with Dr. Hernandez.  Proximal in this date.  If getting sedation then you must not eat or drink 6 hours before the procedure and you must have a  with you.  The office will call you for further instructions.  You will receive Cipro before the procedure due to your history of right total hip replacement.  You will then be seen for your postprocedure follow-up in 6 to 8 weeks.      Disclaimer: This note was created using voice  recognition software. It was not corrected for typographical or grammatical errors, inadvertent word insertion, or any unintended errors. Please feel free to contact me for clarification.        Jocelyn Quiñones, МАРИНА, APRN, FNP-C    [facility]  Office #: [phone]  Fax # [phone]

## 2024-05-10 NOTE — H&P (VIEW-ONLY)
"Subjective   Patient ID: Lindsey Herrera \"Marcela\" is a 63 y.o. female who presents for Follow-up (Post procedure follow up).    Lindsey is a pleasant 63-year-old  female who is here for postprocedure follow-up.  Patient is ambulatory.  Gait is steady.  She arrives by herself.    Patient had left SI joint injection done on 3/12/2024.  The injection has improved her back pain.  It provided 50% relief that lasted for over a month.  She had the most relief the first several weeks..  The injection has improved some of her pain and her ability to participate in her daily activities.    Patient continues to have chronic lower back pain that goes down to her left leg.  She rates her pain a 6 out of 10.  Pain comes and goes but can be constant depending on her activities.  She describes it as aching, sharp, shooting and stabbing kind of pain.  She has pain that goes down to the back of her leg.  It affected her posterior thigh and pain is constant.  She reports she even had a new chair at work to relieve the discomfort.  She mentioned that on occasion the pain shoots all the way down to her foot when she was sitting.  She has no pain, numbness or tingling sensation to her right leg.  She denies leg weakness or change in balance.  She denies bowel or bladder incontinence.  She denies recent falls, injuries, or ER visits.  Has been no changes since she was last seen.    Patient takes Tylenol arthritis as needed for pain relief and on occasion she takes naproxen.  She is taking tizanidine as needed.  She is taking gabapentin 100 mg.  She reports she tried to increase it to 200 mg and she had a reaction to this.  She got so irritable.  Patient had physical therapy in the past and is continuing her home stretching exercise with minimal effect to her left leg.    Patient had MRI of her lumbar spine that was done on 2022 and I reviewed the results.  I discussed the plan of care including pharmacologic and joint interventional " procedure.  I discussed transforaminal GLENYS and she is in agreement to proceed to this procedure.  This is done under fluoroscopy.  Questions were answered during this encounter.    ---------------  3/12/2024: Left SI injection  12/19/2023: Lumbar epidural block #2  10/24/2023: Lumbar epidural block #1  -------------            OARRS:  Jocelyn Quiñones, APRN-CNP, DNP on 5/10/2024 10:33 AM  I have personally reviewed the OARRS report for Lindsey Herrera. I have considered the risks of abuse, dependence, addiction and diversion    Past Medical History  She has a past medical history of Essential (primary) hypertension (09/20/2022), Gastro-esophageal reflux disease without esophagitis (03/02/2022), Hyperglycemia, unspecified (06/30/2021), Hyperlipidemia, unspecified (09/20/2022), Other muscle spasm (12/01/2022), Presence of right artificial hip joint (06/30/2022), Proteinuria, unspecified (09/20/2022), Restless legs syndrome (12/01/2022), and Vitamin D deficiency, unspecified (09/01/2022).    Surgical History  Past Surgical History:   Procedure Laterality Date    OTHER SURGICAL HISTORY  04/04/2019    Cholecystectomy    OTHER SURGICAL HISTORY  04/04/2019    Tubal ligation    OTHER SURGICAL HISTORY  04/04/2019    Hip replacement    OTHER SURGICAL HISTORY  04/04/2019    Tonsillectomy    OTHER SURGICAL HISTORY  07/01/2021    Complete colonoscopy    OTHER SURGICAL HISTORY  07/01/2021    Complete colonoscopy    OTHER SURGICAL HISTORY  07/01/2021    Complete colonoscopy        Social History  She reports that she has never smoked. She has never been exposed to tobacco smoke. She has never used smokeless tobacco. She reports that she does not drink alcohol and does not use drugs.    Family History  Family History   Problem Relation Name Age of Onset    Diabetes Mother      COPD Mother      Thyroid disease Mother          Allergies  Metoprolol, Ace inhibitors, Atorvastatin, Atorvastatin calcium, Metformin, Pseudoephedrine hcl,  Triamterene-hydrochlorothiazid, and Erythromycin      Current Outpatient Medications:     acetaminophen (Tylenol) 500 mg tablet, Take by mouth., Disp: , Rfl:     amLODIPine (Norvasc) 10 mg tablet, Take 1 tablet (10 mg) by mouth once daily., Disp: 90 tablet, Rfl: 0    losartan (Cozaar) 100 mg tablet, TAKE 1 TABLET BY MOUTH DAILY, Disp: 90 tablet, Rfl: 0    naproxen (Naprosyn) 250 mg tablet, Take 1 tablet (250 mg) by mouth 2 times a day with meals., Disp: , Rfl:     omeprazole OTC (PriLOSEC OTC) 20 mg EC tablet, Take 1 tablet (20 mg) by mouth once daily in the morning. Take before meals. Do not crush, chew, or split., Disp: , Rfl:     OneTouch Delica Plus Lancet 33 gauge [facility], For glucose testing once daily, Disp: 100 each, Rfl: 3    OneTouch Verio Reflect Meter misc, , Disp: , Rfl:     OneTouch Verio test strips strip, For glucose testing once daily, Disp: 100 strip, Rfl: 3    rOPINIRole (Requip) 1 mg tablet, Take 1 tablet (1 mg) by mouth once daily at bedtime., Disp: 90 tablet, Rfl: 0    tirzepatide (Mounjaro) 5 mg/0.5 mL pen injector, Inject 5 mg under the skin every 7 days., Disp: 2 mL, Rfl: 10    tiZANidine (Zanaflex) 2 mg tablet, Take 1 tablet by mouth once daily if needed, Disp: 90 tablet, Rfl: 1    glimepiride (AmaryL) 2 mg tablet, Take 1 tablet (2 mg) by mouth once daily in the morning. Take before meals., Disp: 30 tablet, Rfl: 2    topiramate (Topamax) 25 mg tablet, Take 1 tablet (25 mg) by mouth once daily at bedtime., Disp: 30 tablet, Rfl: 0     Review of Systems   Constitutional: Negative.    HENT: Negative.     Eyes: Negative.    Respiratory: Negative.     Cardiovascular: Negative.    Gastrointestinal: Negative.    Endocrine: Negative.    Genitourinary: Negative.    Musculoskeletal:  Positive for arthralgias, back pain and myalgias. Negative for gait problem, joint swelling, neck pain and neck stiffness.   Skin: Negative.    Allergic/Immunologic: Negative.    Neurological: Negative.     Psychiatric/Behavioral: Negative.          Physical Exam  Vitals and nursing note reviewed.   HENT:      Head: Normocephalic.      Nose: Nose normal.   Eyes:      Extraocular Movements: Extraocular movements intact.      Conjunctiva/sclera: Conjunctivae normal.      Pupils: Pupils are equal, round, and reactive to light.   Cardiovascular:      Rate and Rhythm: Normal rate and regular rhythm.   Pulmonary:      Effort: Pulmonary effort is normal.      Breath sounds: Normal breath sounds.   Musculoskeletal:         General: Tenderness present. No swelling, deformity or signs of injury.      Cervical back: No rigidity or tenderness.      Lumbar back: Tenderness present.      Right lower leg: No edema.      Left lower leg: No edema.      Comments: Positive left leg raise eliciting back pain.  Positive left Fabere's test eliciting back pain.  Positive for minimal left SI joint pain on palpation.  Positive for paraspinal tenderness at the lumbar region bilaterally at L4-L5, L5-S1 with rotation with left-sided radicular symptoms that follows this pathway.  BUE 5/5, RLE 5/5, LLE 4-5/5.   Skin:     General: Skin is warm and dry.   Neurological:      General: No focal deficit present.      Mental Status: She is alert and oriented to person, place, and time.   Psychiatric:         Mood and Affect: Mood normal.         Behavior: Behavior normal.          Last Recorded Vitals  BP (!) 170/105   Pulse 73   Temp 36.6 °C (97.9 °F) (Temporal)   Wt 92.1 kg (203 lb)     Relevant Results      Pain Management Panel  More data may exist         Latest Ref Rng & Units 12/1/2023 8/28/2023   Pain Management Panel   Amphetamine Screen, Urine Presumptive Negative Presumptive Negative  PRESUMPTIVE NEGATIVE    Barbiturate Screen, Urine Presumptive Negative Presumptive Negative  PRESUMPTIVE NEGATIVE    Benzodiazepines Screen, Urine Presumptive Negative Presumptive Negative  -   Fentanyl Screen, Urine Presumptive Negative Presumptive Negative   PRESUMPTIVE NEGATIVE         -----------------  Narrative & Impression   MRN: [id number]  Patient Name: EARLINE ADHIKARI     STUDY:  MRI L-SPINE WO;  7/28/2022 4:26 pm     INDICATION:  back and right hip pain  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     COMPARISON:  None.     ACCESSION NUMBER(S):  [id number]     ORDERING CLINICIAN:  MAKENZIE DE     TECHNIQUE:  The lumbar spine was studied in the sagital, axial and coronal planes  utiliing T1 and T2 weighted images.     FINDINGS:  The marrow signal and vertebral body height are normal. The conus and  sacrum are normal.  Images at each interspace reveal the following:  L1/L2  There is normal alignment and vertebral body height. The disc space  is normal. There is no evidence of canal or foraminal narrowing.  There is no evidence of bulging or herniated disc.  L2/L3  There is normal alignment and vertebral body height. The disc space  is normal. There is no evidence of canal or foraminal narrowing.  There is no evidence of bulging or herniated disc.  L3/L4  Mild bilateral facet hypertrophy without canal or foraminal narrowing  L4/L5  Trace spondylolisthesis and pseudo bulging intervertebral disc.  Bilateral facet hypertrophy. Flattening of the thecal sac which  measures approximately 7 mm in AP dimension in the midline. Mild  bilateral foraminal narrowing.  L5/S1  Bilateral facet hypertrophy with mild foraminal narrowing. No  measurable canal stenosis.        IMPRESSION:  * Mild lumbar spondylosis as described   ----------------        Assessment/Plan   Problem List Items Addressed This Visit             ICD-10-CM    Facet arthropathy, lumbosacral M47.817    Relevant Medications    topiramate (Topamax) 25 mg tablet    Chronic lumbar radiculopathy - Primary M54.16    Relevant Medications    topiramate (Topamax) 25 mg tablet    Other Relevant Orders    FL fluoro images no charge    Transforaminal    Lumbar stenosis with neurogenic  claudication M48.062    Relevant Medications    topiramate (Topamax) 25 mg tablet    Other Relevant Orders    FL fluoro images no charge    Transforaminal     Other Visit Diagnoses         Codes    Medication management     Z79.899    Relevant Orders    Opiate/Opioid/Benzo Prescription Compliance    Gabapentin,Urine    OOB Internal Tracking                   1. Chronic lumbar radiculopathy  - topiramate (Topamax) 25 mg tablet; Take 1 tablet (25 mg) by mouth once daily at bedtime.  Dispense: 30 tablet; Refill: 0  - FL fluoro images no charge; Future  - Transforaminal; Future    2. Lumbar stenosis with neurogenic claudication  - topiramate (Topamax) 25 mg tablet; Take 1 tablet (25 mg) by mouth once daily at bedtime.  Dispense: 30 tablet; Refill: 0  - FL fluoro images no charge; Future  - Transforaminal; Future    3. Facet arthropathy, lumbosacral  - topiramate (Topamax) 25 mg tablet; Take 1 tablet (25 mg) by mouth once daily at bedtime.  Dispense: 30 tablet; Refill: 0    4. Medication management  - Opiate/Opioid/Benzo Prescription Compliance; Future  - Gabapentin,Urine; Future  - Opiate/Opioid/Benzo Prescription Compliance  - Gabapentin,Urine  - OOB Internal Tracking       Plan/Follow-up Instructions:     You may stop taking your gabapentin.  I started you on topiramate and you may take 25 mg at bedtime.    Continue taking naproxen or Tylenol arthritis as needed for pain relief.    You are scheduled for left L4-L5 and L5-S1 transforaminal epidural steroid injection on 6/4/2024 in [place] with Dr. Hernandez.  Proximal in this date.  If getting sedation then you must not eat or drink 6 hours before the procedure and you must have a  with you.  The office will call you for further instructions.  You will receive Cipro before the procedure due to your history of right total hip replacement.  You will then be seen for your postprocedure follow-up in 6 to 8 weeks.      Disclaimer: This note was created using voice  recognition software. It was not corrected for typographical or grammatical errors, inadvertent word insertion, or any unintended errors. Please feel free to contact me for clarification.        Jocelyn Quiñones, МАРИНА, APRN, FNP-C    [facility]  Office #: [phone]  Fax # [phone]

## 2024-05-10 NOTE — PROGRESS NOTES
I have personally reviewed the OARRS report for Lindsey Herrera       Last urine drug screening date/ordered today: 05/10/24     Results of last screen: as expected    Last opioid risk screening date/ordered today: 12/1/2023    Pain Scale Screening:   Pain Assessment and Documentation Tool (PADT)   Date of Assessment: 5/10/2024  Analgesia:   Patient reports her pain level on average during the past week is 5on a 0 - 10 scale.   Patient reports that her pain level at its worst during the past week was 7 on a 0 -10 scale.   50% of pain has been relieved during the past week per patient   Patient states that the amount of pain relief she is now obtaining from her current pain reliever(s) is enough to make a real difference in her life.   Query to clinician: Is the patient's pain relief clinically significant? no  Activities of Daily Living:   Physical functioning: unchanged  Family relationships: unchanged  Social relationships: unchanged  Mood: worse  Sleep patterns: unchanged  Overall functioning: unchanged  Adverse Events: No, Lindsey Herrera is not experiencing side effects from current pain reliever.  Patients overall severity of side effect:none  Specific Analgesic Plan: Continue present regimen.

## 2024-05-10 NOTE — PATIENT INSTRUCTIONS
You may stop taking your gabapentin.  I started you on topiramate and you may take 25 mg at bedtime.    Continue taking naproxen or Tylenol arthritis as needed for pain relief.    You are scheduled for left L4-L5 and L5-S1 transforaminal epidural steroid injection on 6/4/2024 in [place] with Dr. Hernandez.  Proximal in this date.  If getting sedation then you must not eat or drink 6 hours before the procedure and you must have a  with you.  The office will call you for further instructions.  You will receive Cipro before the procedure due to your history of right total hip replacement.  You will then be seen for your postprocedure follow-up in 6 to 8 weeks.

## 2024-05-16 LAB — GABAPENTIN UR-MCNC: <5 UG/ML

## 2024-05-24 ENCOUNTER — APPOINTMENT (OUTPATIENT)
Dept: RADIOLOGY | Facility: HOSPITAL | Age: 64
End: 2024-05-24
Payer: MEDICARE

## 2024-05-24 ENCOUNTER — HOSPITAL ENCOUNTER (EMERGENCY)
Facility: HOSPITAL | Age: 64
Discharge: HOME | End: 2024-05-24
Payer: MEDICARE

## 2024-05-24 ENCOUNTER — APPOINTMENT (OUTPATIENT)
Dept: CARDIOLOGY | Facility: HOSPITAL | Age: 64
End: 2024-05-24
Payer: MEDICARE

## 2024-05-24 VITALS
OXYGEN SATURATION: 99 % | HEIGHT: 67 IN | WEIGHT: 293 LBS | BODY MASS INDEX: 45.99 KG/M2 | TEMPERATURE: 98.8 F | SYSTOLIC BLOOD PRESSURE: 161 MMHG | HEART RATE: 91 BPM | DIASTOLIC BLOOD PRESSURE: 103 MMHG | RESPIRATION RATE: 20 BRPM

## 2024-05-24 DIAGNOSIS — N85.8 UTERINE MASS: Primary | ICD-10-CM

## 2024-05-24 DIAGNOSIS — B37.9 CANDIDIASIS: ICD-10-CM

## 2024-05-24 LAB
ALBUMIN SERPL BCP-MCNC: 4.1 G/DL (ref 3.4–5)
ALP SERPL-CCNC: 61 U/L (ref 33–136)
ALT SERPL W P-5'-P-CCNC: 28 U/L (ref 7–45)
AMORPH CRY #/AREA UR COMP ASSIST: ABNORMAL /HPF
ANION GAP BLDV CALCULATED.4IONS-SCNC: 13 MMOL/L (ref 10–25)
ANION GAP SERPL CALC-SCNC: 13 MMOL/L (ref 10–20)
APPEARANCE UR: ABNORMAL
AST SERPL W P-5'-P-CCNC: 26 U/L (ref 9–39)
BASE EXCESS BLDV CALC-SCNC: -0.8 MMOL/L (ref -2–3)
BASOPHILS # BLD AUTO: 0.03 X10*3/UL (ref 0–0.1)
BASOPHILS NFR BLD AUTO: 0.3 %
BILIRUB SERPL-MCNC: 0.4 MG/DL (ref 0–1.2)
BILIRUB UR STRIP.AUTO-MCNC: NEGATIVE MG/DL
BODY TEMPERATURE: ABNORMAL
BUN SERPL-MCNC: 14 MG/DL (ref 6–23)
CA-I BLDV-SCNC: 1.35 MMOL/L (ref 1.1–1.33)
CALCIUM SERPL-MCNC: 10.3 MG/DL (ref 8.6–10.3)
CARDIAC TROPONIN I PNL SERPL HS: 5 NG/L (ref 0–13)
CHLORIDE BLDV-SCNC: 104 MMOL/L (ref 98–107)
CHLORIDE SERPL-SCNC: 105 MMOL/L (ref 98–107)
CO2 SERPL-SCNC: 24 MMOL/L (ref 21–32)
COLOR UR: ABNORMAL
CREAT SERPL-MCNC: 0.75 MG/DL (ref 0.5–1.05)
EGFRCR SERPLBLD CKD-EPI 2021: >90 ML/MIN/1.73M*2
EOSINOPHIL # BLD AUTO: 0.01 X10*3/UL (ref 0–0.7)
EOSINOPHIL NFR BLD AUTO: 0.1 %
ERYTHROCYTE [DISTWIDTH] IN BLOOD BY AUTOMATED COUNT: 13.2 % (ref 11.5–14.5)
GLUCOSE BLDV-MCNC: 139 MG/DL (ref 74–99)
GLUCOSE SERPL-MCNC: 135 MG/DL (ref 74–99)
GLUCOSE UR STRIP.AUTO-MCNC: NORMAL MG/DL
HCO3 BLDV-SCNC: 23.5 MMOL/L (ref 22–26)
HCT VFR BLD AUTO: 45.5 % (ref 36–46)
HCT VFR BLD EST: 46 % (ref 36–46)
HGB BLD-MCNC: 15 G/DL (ref 12–16)
HGB BLDV-MCNC: 15.4 G/DL (ref 12–16)
IMM GRANULOCYTES # BLD AUTO: 0.1 X10*3/UL (ref 0–0.7)
IMM GRANULOCYTES NFR BLD AUTO: 0.9 % (ref 0–0.9)
INHALED O2 CONCENTRATION: 21 %
KETONES UR STRIP.AUTO-MCNC: NEGATIVE MG/DL
LACTATE BLDV-SCNC: 1.4 MMOL/L (ref 0.4–2)
LEUKOCYTE ESTERASE UR QL STRIP.AUTO: ABNORMAL
LYMPHOCYTES # BLD AUTO: 1.79 X10*3/UL (ref 1.2–4.8)
LYMPHOCYTES NFR BLD AUTO: 15.6 %
MAGNESIUM SERPL-MCNC: 2.06 MG/DL (ref 1.6–2.4)
MCH RBC QN AUTO: 29.5 PG (ref 26–34)
MCHC RBC AUTO-ENTMCNC: 33 G/DL (ref 32–36)
MCV RBC AUTO: 89 FL (ref 80–100)
MONOCYTES # BLD AUTO: 0.49 X10*3/UL (ref 0.1–1)
MONOCYTES NFR BLD AUTO: 4.3 %
MUCOUS THREADS #/AREA URNS AUTO: ABNORMAL /LPF
NEUTROPHILS # BLD AUTO: 9.05 X10*3/UL (ref 1.2–7.7)
NEUTROPHILS NFR BLD AUTO: 78.8 %
NITRITE UR QL STRIP.AUTO: NEGATIVE
NRBC BLD-RTO: 0 /100 WBCS (ref 0–0)
OXYHGB MFR BLDV: 50.9 % (ref 45–75)
PCO2 BLDV: 37 MM HG (ref 41–51)
PH BLDV: 7.41 PH (ref 7.33–7.43)
PH UR STRIP.AUTO: 7.5 [PH]
PLATELET # BLD AUTO: 328 X10*3/UL (ref 150–450)
PO2 BLDV: 28 MM HG (ref 35–45)
POTASSIUM BLDV-SCNC: 4 MMOL/L (ref 3.5–5.3)
POTASSIUM SERPL-SCNC: 4.1 MMOL/L (ref 3.5–5.3)
PROT SERPL-MCNC: 7.5 G/DL (ref 6.4–8.2)
PROT UR STRIP.AUTO-MCNC: ABNORMAL MG/DL
RBC # BLD AUTO: 5.09 X10*6/UL (ref 4–5.2)
RBC # UR STRIP.AUTO: NEGATIVE /UL
RBC #/AREA URNS AUTO: ABNORMAL /HPF
SAO2 % BLDV: 52 % (ref 45–75)
SODIUM BLDV-SCNC: 136 MMOL/L (ref 136–145)
SODIUM SERPL-SCNC: 138 MMOL/L (ref 136–145)
SP GR UR STRIP.AUTO: 1.02
SQUAMOUS #/AREA URNS AUTO: ABNORMAL /HPF
TRANS CELLS #/AREA UR COMP ASSIST: ABNORMAL /HPF
UROBILINOGEN UR STRIP.AUTO-MCNC: NORMAL MG/DL
WBC # BLD AUTO: 11.5 X10*3/UL (ref 4.4–11.3)
WBC #/AREA URNS AUTO: ABNORMAL /HPF

## 2024-05-24 PROCEDURE — 85025 COMPLETE CBC W/AUTO DIFF WBC: CPT | Performed by: PHYSICIAN ASSISTANT

## 2024-05-24 PROCEDURE — 84132 ASSAY OF SERUM POTASSIUM: CPT | Mod: 59 | Performed by: PHYSICIAN ASSISTANT

## 2024-05-24 PROCEDURE — 93005 ELECTROCARDIOGRAM TRACING: CPT

## 2024-05-24 PROCEDURE — 96375 TX/PRO/DX INJ NEW DRUG ADDON: CPT

## 2024-05-24 PROCEDURE — 74177 CT ABD & PELVIS W/CONTRAST: CPT

## 2024-05-24 PROCEDURE — 96361 HYDRATE IV INFUSION ADD-ON: CPT

## 2024-05-24 PROCEDURE — 36415 COLL VENOUS BLD VENIPUNCTURE: CPT | Performed by: PHYSICIAN ASSISTANT

## 2024-05-24 PROCEDURE — 81001 URINALYSIS AUTO W/SCOPE: CPT | Performed by: PHYSICIAN ASSISTANT

## 2024-05-24 PROCEDURE — 83735 ASSAY OF MAGNESIUM: CPT | Performed by: PHYSICIAN ASSISTANT

## 2024-05-24 PROCEDURE — 99284 EMERGENCY DEPT VISIT MOD MDM: CPT | Mod: 25

## 2024-05-24 PROCEDURE — 87086 URINE CULTURE/COLONY COUNT: CPT | Mod: GEALAB | Performed by: PHYSICIAN ASSISTANT

## 2024-05-24 PROCEDURE — 84484 ASSAY OF TROPONIN QUANT: CPT | Performed by: PHYSICIAN ASSISTANT

## 2024-05-24 PROCEDURE — 74177 CT ABD & PELVIS W/CONTRAST: CPT | Mod: FOREIGN READ | Performed by: RADIOLOGY

## 2024-05-24 PROCEDURE — 96374 THER/PROPH/DIAG INJ IV PUSH: CPT | Mod: 59

## 2024-05-24 PROCEDURE — 2550000001 HC RX 255 CONTRASTS: Performed by: PHYSICIAN ASSISTANT

## 2024-05-24 PROCEDURE — 2500000004 HC RX 250 GENERAL PHARMACY W/ HCPCS (ALT 636 FOR OP/ED): Performed by: PHYSICIAN ASSISTANT

## 2024-05-24 PROCEDURE — 84075 ASSAY ALKALINE PHOSPHATASE: CPT | Performed by: PHYSICIAN ASSISTANT

## 2024-05-24 PROCEDURE — 2500000001 HC RX 250 WO HCPCS SELF ADMINISTERED DRUGS (ALT 637 FOR MEDICARE OP): Performed by: PHYSICIAN ASSISTANT

## 2024-05-24 RX ORDER — ONDANSETRON HYDROCHLORIDE 2 MG/ML
4 INJECTION, SOLUTION INTRAVENOUS ONCE
Status: COMPLETED | OUTPATIENT
Start: 2024-05-24 | End: 2024-05-24

## 2024-05-24 RX ORDER — NYSTATIN 100000 [USP'U]/G
1 POWDER TOPICAL 2 TIMES DAILY
Qty: 1 G | Refills: 0 | Status: SHIPPED | OUTPATIENT
Start: 2024-05-24 | End: 2024-06-23

## 2024-05-24 RX ORDER — OXYCODONE HYDROCHLORIDE 5 MG/1
5 TABLET ORAL EVERY 6 HOURS PRN
Qty: 12 TABLET | Refills: 0 | Status: SHIPPED | OUTPATIENT
Start: 2024-05-24 | End: 2024-05-27

## 2024-05-24 RX ORDER — OXYCODONE HYDROCHLORIDE 5 MG/1
5 TABLET ORAL ONCE
Status: COMPLETED | OUTPATIENT
Start: 2024-05-24 | End: 2024-05-24

## 2024-05-24 RX ORDER — ONDANSETRON 4 MG/1
4 TABLET, ORALLY DISINTEGRATING ORAL EVERY 8 HOURS PRN
Qty: 20 TABLET | Refills: 0 | Status: SHIPPED | OUTPATIENT
Start: 2024-05-24 | End: 2024-05-31

## 2024-05-24 RX ADMIN — OXYCODONE HYDROCHLORIDE 5 MG: 5 TABLET ORAL at 17:42

## 2024-05-24 RX ADMIN — ONDANSETRON 4 MG: 2 INJECTION INTRAMUSCULAR; INTRAVENOUS at 15:11

## 2024-05-24 RX ADMIN — SODIUM CHLORIDE 1000 ML: 9 INJECTION, SOLUTION INTRAVENOUS at 15:12

## 2024-05-24 RX ADMIN — HYDROMORPHONE HYDROCHLORIDE 0.5 MG: 1 INJECTION, SOLUTION INTRAMUSCULAR; INTRAVENOUS; SUBCUTANEOUS at 15:12

## 2024-05-24 RX ADMIN — IOHEXOL 75 ML: 350 INJECTION, SOLUTION INTRAVENOUS at 15:53

## 2024-05-24 ASSESSMENT — PAIN SCALES - GENERAL: PAINLEVEL_OUTOF10: 10 - WORST POSSIBLE PAIN

## 2024-05-24 ASSESSMENT — PAIN DESCRIPTION - DESCRIPTORS: DESCRIPTORS: STABBING

## 2024-05-24 ASSESSMENT — LIFESTYLE VARIABLES
EVER HAD A DRINK FIRST THING IN THE MORNING TO STEADY YOUR NERVES TO GET RID OF A HANGOVER: NO
HAVE PEOPLE ANNOYED YOU BY CRITICIZING YOUR DRINKING: NO
EVER FELT BAD OR GUILTY ABOUT YOUR DRINKING: NO
TOTAL SCORE: 0
HAVE YOU EVER FELT YOU SHOULD CUT DOWN ON YOUR DRINKING: NO

## 2024-05-24 ASSESSMENT — PAIN DESCRIPTION - LOCATION: LOCATION: BACK

## 2024-05-24 ASSESSMENT — PAIN DESCRIPTION - PAIN TYPE: TYPE: ACUTE PAIN

## 2024-05-24 ASSESSMENT — PAIN - FUNCTIONAL ASSESSMENT: PAIN_FUNCTIONAL_ASSESSMENT: 0-10

## 2024-05-24 ASSESSMENT — PAIN DESCRIPTION - ORIENTATION: ORIENTATION: LEFT

## 2024-05-24 NOTE — ED PROVIDER NOTES
"HPI   Chief Complaint   Patient presents with    Flank Pain     LEFT flank pain radiating to the front since 0630.       63-year-old female with a history of diabetes who presents with chief complaint of left lower quadrant pain.  Patient states she woke up this morning with the pain.  She states that has slowly worsened throughout the day.  She also endorses nausea.  No vomiting.  She does have chronic constipation however she did have a small bowel movement this morning.  She states she was diagnosed with diverticulosis on colonoscopy which she states she is \"overdue for her next colonoscopy\".  She has not noticed any blood or mucus in the stool.  She does not feel that she has had any fever or chills at home.  She does not endorse any dysuria or hematuria.                          No data recorded                   Patient History   Past Medical History:   Diagnosis Date    Essential (primary) hypertension 09/20/2022    Benign essential hypertension    Gastro-esophageal reflux disease without esophagitis 03/02/2022    GERD (gastroesophageal reflux disease)    Hyperglycemia, unspecified 06/30/2021    Hyperglycemia    Hyperlipidemia, unspecified 09/20/2022    Dyslipidemia    Other muscle spasm 12/01/2022    Muscle spasm    Presence of right artificial hip joint 06/30/2022    Presence of right artificial hip joint    Proteinuria, unspecified 09/20/2022    Proteinuria    Restless legs syndrome 12/01/2022    Restless legs syndrome    Vitamin D deficiency, unspecified 09/01/2022    Vitamin D deficiency     Past Surgical History:   Procedure Laterality Date    OTHER SURGICAL HISTORY  04/04/2019    Cholecystectomy    OTHER SURGICAL HISTORY  04/04/2019    Tubal ligation    OTHER SURGICAL HISTORY  04/04/2019    Hip replacement    OTHER SURGICAL HISTORY  04/04/2019    Tonsillectomy    OTHER SURGICAL HISTORY  07/01/2021    Complete colonoscopy    OTHER SURGICAL HISTORY  07/01/2021    Complete colonoscopy    OTHER SURGICAL " HISTORY  07/01/2021    Complete colonoscopy     Family History   Problem Relation Name Age of Onset    Diabetes Mother      COPD Mother      Thyroid disease Mother       Social History     Tobacco Use    Smoking status: Never     Passive exposure: Never    Smokeless tobacco: Never   Vaping Use    Vaping status: Never Used   Substance Use Topics    Alcohol use: Never     Comment: social    Drug use: Never       Physical Exam   ED Triage Vitals [05/24/24 1336]   Temperature Heart Rate Respirations BP   37.1 °C (98.8 °F) 95 20 (!) 149/101      Pulse Ox Temp Source Heart Rate Source Patient Position   96 % Skin Monitor Sitting      BP Location FiO2 (%)     Left arm --       Physical Exam  Constitutional:       General: She is not in acute distress.     Appearance: She is not ill-appearing or toxic-appearing.   HENT:      Head: Normocephalic and atraumatic.      Nose: Nose normal.      Mouth/Throat:      Mouth: Mucous membranes are moist.   Eyes:      Extraocular Movements: Extraocular movements intact.      Pupils: Pupils are equal, round, and reactive to light.   Cardiovascular:      Rate and Rhythm: Normal rate and regular rhythm.      Pulses: Normal pulses.      Heart sounds: Normal heart sounds. No murmur heard.     No friction rub. No gallop.   Pulmonary:      Effort: Pulmonary effort is normal. No respiratory distress.      Breath sounds: Normal breath sounds. No wheezing, rhonchi or rales.   Chest:      Chest wall: No tenderness.   Abdominal:      General: There is no distension.      Palpations: Abdomen is soft.      Tenderness: There is abdominal tenderness. There is guarding.      Comments: Left lower quadrant tenderness to palpation.  Not peritonitic.   Musculoskeletal:         General: No swelling, tenderness, deformity or signs of injury. Normal range of motion.      Cervical back: Normal range of motion. No tenderness.   Skin:     General: Skin is warm and dry.      Capillary Refill: Capillary refill takes  "less than 2 seconds.      Findings: Rash present.      Comments: Patient with candidal appearing rash in left pannus skin fold.  Patient states she feels that it is \"more red\" than usual.  No exquisite tenderness to palpation.  No crepitus.   Neurological:      General: No focal deficit present.      Mental Status: She is alert and oriented to person, place, and time.      Motor: No weakness.   Psychiatric:         Mood and Affect: Mood normal.         Behavior: Behavior normal.         ED Course & MDM   ED Course as of 05/25/24 1844   Fri May 24, 2024   1629 Troponin I, High Sensitivity  Negative troponin [MW]   1629 Blood Gas Venous Full Panel(!)  Nonfasting glucose. [MW]   1629 CBC and Auto Differential(!)  Mild leukocytosis [MW]   1630 Comprehensive metabolic panel(!)  Nonfasting glucose [MW]   1630 Magnesium  Normal [MW]      ED Course User Index  [MW] Orion Gonzaelz PA-C         Diagnoses as of 05/25/24 1844   Uterine mass   Candidiasis       Medical Decision Making  63-year-old with diabetes presenting with left lower quadrant pain.  She is also nauseated.  On exam she does have tenderness to the left lower quadrant as well as voluntary guarding.  She also has fungal appearing rash over the left inguinal pannus fold.  She states that she has a history of diverticulosis but has never had diverticulitis.  Given her history and concern for potential diverticulitis.  Additionally given her history of diabetes and candidal infection of the groin alternate diagnosis could include cellulitis.  I have a low suspicion for NSTI given lack of crepitus or exquisite tenderness on exam.    An EKG was ordered and interpreted by me with confirmation by Dr. Jean.  Nsr, no st changes. No stemi. Normal axis.     CT with IV contrast of the abdomen and pelvis was ordered along with labs including CBC, CMP, mag, venous full panel, troponin.    CT scan is reassuring from an infectious standpoint as well as her labs however " on scan she was found to have a 7 cm cervical uterine mass.  I discussed this with the patient and her  with her consent to discuss in front of her .  We discussed that there is no way to tell from CT scan what exactly this mass is.  I discussed that this could be a cyst versus malignancy or other sort of mass unspecified.  The patient appropriately was concerned.  I spoke with her about the importance of follow-up with gynecology.  I sent a referral and gave the phone number for her to call.  The patient was feeling better from a pain standpoint and a nausea standpoint.  She was prescribed pain control and antiemetic.  Additionally was prescribed nystatin powder for fungal infection of pannus.              Procedure  Procedures     Orion Gonzalez PA-C  05/25/24 5232

## 2024-05-25 LAB — HOLD SPECIMEN: NORMAL

## 2024-05-26 LAB — BACTERIA UR CULT: ABNORMAL

## 2024-05-28 ENCOUNTER — OFFICE VISIT (OUTPATIENT)
Dept: PRIMARY CARE | Facility: CLINIC | Age: 64
End: 2024-05-28
Payer: COMMERCIAL

## 2024-05-28 ENCOUNTER — TELEPHONE (OUTPATIENT)
Dept: PHARMACY | Facility: HOSPITAL | Age: 64
End: 2024-05-28

## 2024-05-28 ENCOUNTER — PHARMACY VISIT (OUTPATIENT)
Dept: PHARMACY | Facility: CLINIC | Age: 64
End: 2024-05-28
Payer: COMMERCIAL

## 2024-05-28 VITALS
HEART RATE: 100 BPM | BODY MASS INDEX: 46.36 KG/M2 | SYSTOLIC BLOOD PRESSURE: 116 MMHG | TEMPERATURE: 98 F | WEIGHT: 293 LBS | OXYGEN SATURATION: 95 % | DIASTOLIC BLOOD PRESSURE: 84 MMHG

## 2024-05-28 DIAGNOSIS — E11.69 TYPE 2 DIABETES MELLITUS WITH OTHER SPECIFIED COMPLICATION, WITHOUT LONG-TERM CURRENT USE OF INSULIN (MULTI): ICD-10-CM

## 2024-05-28 DIAGNOSIS — M48.062 LUMBAR STENOSIS WITH NEUROGENIC CLAUDICATION: ICD-10-CM

## 2024-05-28 DIAGNOSIS — I10 HYPERTENSION, UNSPECIFIED TYPE: ICD-10-CM

## 2024-05-28 DIAGNOSIS — N39.0 URINARY TRACT INFECTION WITHOUT HEMATURIA, SITE UNSPECIFIED: ICD-10-CM

## 2024-05-28 DIAGNOSIS — I25.10 CORONARY ARTERY DISEASE INVOLVING NATIVE CORONARY ARTERY OF NATIVE HEART WITHOUT ANGINA PECTORIS: ICD-10-CM

## 2024-05-28 DIAGNOSIS — N85.8 UTERINE MASS: Primary | ICD-10-CM

## 2024-05-28 DIAGNOSIS — E66.01 CLASS 3 SEVERE OBESITY DUE TO EXCESS CALORIES WITH SERIOUS COMORBIDITY AND BODY MASS INDEX (BMI) OF 45.0 TO 49.9 IN ADULT (MULTI): ICD-10-CM

## 2024-05-28 LAB
POC APPEARANCE, URINE: ABNORMAL
POC BILIRUBIN, URINE: NEGATIVE
POC BLOOD, URINE: ABNORMAL
POC COLOR, URINE: YELLOW
POC GLUCOSE, URINE: NEGATIVE MG/DL
POC KETONES, URINE: NEGATIVE MG/DL
POC LEUKOCYTES, URINE: ABNORMAL
POC NITRITE,URINE: NEGATIVE
POC PH, URINE: 6 PH
POC PROTEIN, URINE: ABNORMAL MG/DL
POC SPECIFIC GRAVITY, URINE: 1.01
POC UROBILINOGEN, URINE: 0.2 EU/DL

## 2024-05-28 PROCEDURE — 81002 URINALYSIS NONAUTO W/O SCOPE: CPT | Performed by: INTERNAL MEDICINE

## 2024-05-28 PROCEDURE — 3049F LDL-C 100-129 MG/DL: CPT | Performed by: INTERNAL MEDICINE

## 2024-05-28 PROCEDURE — 3079F DIAST BP 80-89 MM HG: CPT | Performed by: INTERNAL MEDICINE

## 2024-05-28 PROCEDURE — 1036F TOBACCO NON-USER: CPT | Performed by: INTERNAL MEDICINE

## 2024-05-28 PROCEDURE — 4010F ACE/ARB THERAPY RXD/TAKEN: CPT | Performed by: INTERNAL MEDICINE

## 2024-05-28 PROCEDURE — 3074F SYST BP LT 130 MM HG: CPT | Performed by: INTERNAL MEDICINE

## 2024-05-28 PROCEDURE — 3060F POS MICROALBUMINURIA REV: CPT | Performed by: INTERNAL MEDICINE

## 2024-05-28 PROCEDURE — 87086 URINE CULTURE/COLONY COUNT: CPT

## 2024-05-28 PROCEDURE — 3051F HG A1C>EQUAL 7.0%<8.0%: CPT | Performed by: INTERNAL MEDICINE

## 2024-05-28 PROCEDURE — 87186 SC STD MICRODIL/AGAR DIL: CPT

## 2024-05-28 PROCEDURE — 3008F BODY MASS INDEX DOCD: CPT | Performed by: INTERNAL MEDICINE

## 2024-05-28 PROCEDURE — 99214 OFFICE O/P EST MOD 30 MIN: CPT | Performed by: INTERNAL MEDICINE

## 2024-05-28 PROCEDURE — RXMED WILLOW AMBULATORY MEDICATION CHARGE

## 2024-05-28 RX ORDER — AMOXICILLIN AND CLAVULANATE POTASSIUM 875; 125 MG/1; MG/1
875 TABLET, FILM COATED ORAL 2 TIMES DAILY
Qty: 20 TABLET | Refills: 0 | Status: SHIPPED | OUTPATIENT
Start: 2024-05-28 | End: 2024-06-07

## 2024-05-28 NOTE — PROGRESS NOTES
"Subjective   Patient ID: Lindsey Herrera \"Marcela\" is a 63 y.o. female who presents for ER Follow-up (5/24/24-  Abdominal pain/7cm uterine Mass) and Urinary Retention (Started 5/25/2024).  HPI    Post ER follow up    Dx uterine mass        Possible UTI dysuria, frequency, chills    UTI  Check  u/a and culture  Augmentin 875 mg bid #20 no refills  Risk / benefits rev'd  Rest increase fluids    Rec's rev'd    Hypertension better on therapy no side effects  On losartan 100 mg daily  Continue norvasc  follow     Hiatal hernia   diet discussed   Using otc daily     Hypercholesterolemia patient stopped statin therapy \"felt she didn't need it\"  Cardio will recheck in October / not done  Labs and cardio follow up cancelled by pt / not rescheduled     CAD by elevated CT cardiac score patient on aspirin.    Cardiology following but pt cancelled follow up  Pt aware of risks including death     DM A1c 7.4   3-24 patient still refuses medication risk / benefits reviewed.   FBS not checked either  Aware of risks including but not limited to  MI, stroke, blindness, renal failure and death  GI side effects from mounjaro  Endo following     Chronic back pain  Pain mgmt following  Injection pending     RLS  on rx no side effects     Dense breast tissue on mammogram patient not interested in MRI of the breast.     Hypercalcemia improved continue to follow     Abnormal AST and ALT improved continue to follow     Diet and exercise reviewed     GYN, colonoscopy, mammogram and dexa  Pt declined at present     Immunizations rev'd    pt declined  Risks rev'd including death / pt understood    Review of Systems   All other systems reviewed and are negative.      Objective   /84   Pulse 100   Temp 36.7 °C (98 °F)   Wt 134 kg (296 lb)   SpO2 95%   BMI 46.36 kg/m²   Lab Results   Component Value Date    WBC 11.5 (H) 05/24/2024    HGB 15.0 05/24/2024    HCT 45.5 05/24/2024     05/24/2024    CHOL 207 (H) 03/09/2024    TRIG 215 (H) " 03/09/2024    HDL 39.1 03/09/2024    ALT 28 05/24/2024    AST 26 05/24/2024     05/24/2024    K 4.1 05/24/2024     05/24/2024    CREATININE 0.75 05/24/2024    BUN 14 05/24/2024    CO2 24 05/24/2024    TSH 2.71 03/09/2024    HGBA1C 7.4 (H) 03/09/2024           Physical Exam  Vitals reviewed.   Constitutional:       Appearance: Normal appearance. She is obese.   HENT:      Head: Normocephalic and atraumatic.      Mouth/Throat:      Pharynx: No posterior oropharyngeal erythema.   Eyes:      General: No scleral icterus.     Conjunctiva/sclera: Conjunctivae normal.      Pupils: Pupils are equal, round, and reactive to light.   Cardiovascular:      Rate and Rhythm: Normal rate and regular rhythm.      Heart sounds: Normal heart sounds.   Pulmonary:      Effort: No respiratory distress.      Breath sounds: No wheezing.   Abdominal:      General: Abdomen is flat. Bowel sounds are normal. There is no distension.      Palpations: Abdomen is soft. There is no mass.      Tenderness: There is no abdominal tenderness. There is no rebound.   Genitourinary:     Comments: No CVA tenderness  Musculoskeletal:         General: Normal range of motion.      Cervical back: Normal range of motion and neck supple.   Skin:     General: Skin is warm and dry.   Neurological:      General: No focal deficit present.      Mental Status: She is alert and oriented to person, place, and time. Mental status is at baseline.   Psychiatric:         Mood and Affect: Mood normal.         Behavior: Behavior normal.         Thought Content: Thought content normal.         Judgment: Judgment normal.         Problem List Items Addressed This Visit             ICD-10-CM    DM (diabetes mellitus), type 2 (Multi) E11.9    Class 3 severe obesity due to excess calories with serious comorbidity and body mass index (BMI) of 45.0 to 49.9 in adult (Multi) E66.01, Z68.42    Lumbar stenosis with neurogenic claudication M48.062     Other Visit Diagnoses        "  Codes    Uterine mass    -  Primary N85.8    Relevant Orders    Referral to Obstetrics / Gynecology    Urinary tract infection without hematuria, site unspecified     N39.0    Relevant Medications    amoxicillin-pot clavulanate (Augmentin) 875-125 mg tablet    Other Relevant Orders    POCT UA (nonautomated) manually resulted (Completed)    Urine culture    Hypertension, unspecified type     I10    Coronary artery disease involving native coronary artery of native heart without angina pectoris     I25.10          Assessment/Plan     Post ER follow up    Dx uterine mass        GYN consult          Possible UTI dysuria, frequency, chills    UTI  Check  u/a and culture  Augmentin 875 mg bid #20 no refills  Risk / benefits rev'd  Rest increase fluids    Rec's rev'd    Hypertension better on therapy no side effects  On losartan 100 mg daily  Continue norvasc  follow     Hiatal hernia   diet discussed   Using otc daily     Hypercholesterolemia patient stopped statin therapy \"felt she didn't need it\"  Cardio will recheck in October / not done  Labs and cardio follow up cancelled by pt / not rescheduled     CAD by elevated CT cardiac score patient on aspirin.    Cardiology following but pt cancelled follow up  Pt aware of risks including death     DM A1c 7.4   3-24 patient still refuses medication risk / benefits reviewed.   FBS not checked either  Aware of risks including but not limited to  MI, stroke, blindness, renal failure and death  GI side effects from mounjaro  Endo following     Chronic back pain  Pain mgmt following  Injection pending     RLS  on rx no side effects     Dense breast tissue on mammogram patient not interested in MRI of the breast.     Hypercalcemia improved continue to follow     Abnormal AST and ALT improved continue to follow     Diet and exercise reviewed     GYN, colonoscopy, mammogram and dexa  Pt declined at present     Immunizations rev'd    pt declined  Risks rev'd including death / pt " understood    Mammogram 11-22  Dexa none  Colonoscopy 17  was to recheck 3-5 yrs / past due  CT chest lung cancer screening n/a  GYN past due  immunizations rev'd  BMI 46.3    Follow up as scheduled

## 2024-05-28 NOTE — PROGRESS NOTES
EDPD Note: Rapid Result Review    Reviewed Ms. Lindsey Herrera 's chart regarding an inconclusive urine culture that was taken during their recent emergency room visit. The patient was not told about these results prior to leaving the emergency department. However, patient had a follow up visit today with PCP who reviewed culture and ordered a new culture to be done. Therefore, patient was not contacted as education was provided.    Patient presented to ED due to flank pain and nausea. No urinary symptoms noted. She stated that she has a history of diverticulosis but has never had diverticulitis. No antibiotic prescribed at that time. At office visit today repeat culture was sent as well as a script for Augmentin 875 mg BID x 10 days    No results found for the last 90 days.      No further follow up needed from EDPD Team.     Maria G Bearden, TeriD

## 2024-05-29 ENCOUNTER — OFFICE VISIT (OUTPATIENT)
Dept: OBSTETRICS AND GYNECOLOGY | Facility: CLINIC | Age: 64
End: 2024-05-29
Payer: COMMERCIAL

## 2024-05-29 VITALS
WEIGHT: 293 LBS | SYSTOLIC BLOOD PRESSURE: 124 MMHG | HEIGHT: 67 IN | DIASTOLIC BLOOD PRESSURE: 86 MMHG | BODY MASS INDEX: 45.99 KG/M2

## 2024-05-29 DIAGNOSIS — R10.2 PAIN IN FEMALE PELVIS: Primary | ICD-10-CM

## 2024-05-29 DIAGNOSIS — R93.5 ABNORMAL CT SCAN, PELVIS: ICD-10-CM

## 2024-05-29 LAB
ATRIAL RATE: 82 BPM
P AXIS: 36 DEGREES
P OFFSET: 176 MS
P ONSET: 122 MS
PR INTERVAL: 178 MS
Q ONSET: 211 MS
QRS COUNT: 14 BEATS
QRS DURATION: 98 MS
QT INTERVAL: 384 MS
QTC CALCULATION(BAZETT): 448 MS
QTC FREDERICIA: 426 MS
R AXIS: -36 DEGREES
T AXIS: 25 DEGREES
T OFFSET: 403 MS
VENTRICULAR RATE: 82 BPM

## 2024-05-29 PROCEDURE — 99204 OFFICE O/P NEW MOD 45 MIN: CPT | Performed by: OBSTETRICS & GYNECOLOGY

## 2024-05-29 PROCEDURE — 3051F HG A1C>EQUAL 7.0%<8.0%: CPT | Performed by: OBSTETRICS & GYNECOLOGY

## 2024-05-29 PROCEDURE — 3008F BODY MASS INDEX DOCD: CPT | Performed by: OBSTETRICS & GYNECOLOGY

## 2024-05-29 PROCEDURE — 3074F SYST BP LT 130 MM HG: CPT | Performed by: OBSTETRICS & GYNECOLOGY

## 2024-05-29 PROCEDURE — 4010F ACE/ARB THERAPY RXD/TAKEN: CPT | Performed by: OBSTETRICS & GYNECOLOGY

## 2024-05-29 PROCEDURE — 3079F DIAST BP 80-89 MM HG: CPT | Performed by: OBSTETRICS & GYNECOLOGY

## 2024-05-29 PROCEDURE — 3049F LDL-C 100-129 MG/DL: CPT | Performed by: OBSTETRICS & GYNECOLOGY

## 2024-05-29 PROCEDURE — 3060F POS MICROALBUMINURIA REV: CPT | Performed by: OBSTETRICS & GYNECOLOGY

## 2024-05-29 NOTE — PROGRESS NOTES
"Subjective   Patient ID: Lindsey Herrera \"Marcela\" is a 63 y.o. female who presents for Follow up CT.  Recently emergency room, note reviewed     Orion Gonzalez PA-C   Physician Assistant  Emergency Medicine     ED Provider Notes     Signed     Date of Service: 5/24/2024 12:36 PM    Signed     Expand All Collapse All       HPI     Chief Complaint  Patient presents with  · Flank Pain      LEFT flank pain radiating to the front since 0630.        63-year-old female with a history of diabetes who presents with chief complaint of left lower quadrant pain.  Patient states she woke up this morning with the pain.  She states that has slowly worsened throughout the day.  She also endorses nausea.  No vomiting.  She does have chronic constipation however she did have a small bowel movement this morning.  She states she was diagnosed with diverticulosis on colonoscopy which she states she is \"overdue for her next colonoscopy\".  She has not noticed any blood or mucus in the stool.  She does not feel that she has had any fever or chills at home.  She does not endorse any dysuria or hematuria.                          No data recorded                          Patient History     Medical History  Past Medical History:  Diagnosis Date  · Essential (primary) hypertension 09/20/2022    Benign essential hypertension  · Gastro-esophageal reflux disease without esophagitis 03/02/2022    GERD (gastroesophageal reflux disease)  · Hyperglycemia, unspecified 06/30/2021    Hyperglycemia  · Hyperlipidemia, unspecified 09/20/2022    Dyslipidemia  · Other muscle spasm 12/01/2022    Muscle spasm  · Presence of right artificial hip joint 06/30/2022    Presence of right artificial hip joint  · Proteinuria, unspecified 09/20/2022    Proteinuria  · Restless legs syndrome 12/01/2022    Restless legs syndrome  · Vitamin D deficiency, unspecified 09/01/2022    Vitamin D deficiency         Surgical History  Past Surgical " History:  Procedure Laterality Date  · OTHER SURGICAL HISTORY   04/04/2019    Cholecystectomy  · OTHER SURGICAL HISTORY   04/04/2019    Tubal ligation  · OTHER SURGICAL HISTORY   04/04/2019    Hip replacement  · OTHER SURGICAL HISTORY   04/04/2019    Tonsillectomy  · OTHER SURGICAL HISTORY   07/01/2021    Complete colonoscopy  · OTHER SURGICAL HISTORY   07/01/2021    Complete colonoscopy  · OTHER SURGICAL HISTORY   07/01/2021    Complete colonoscopy         Family History  Family History  Problem Relation Name Age of Onset  · Diabetes Mother      · COPD Mother      · Thyroid disease Mother             Social History       Tobacco Use  · Smoking status: Never      Passive exposure: Never  · Smokeless tobacco: Never  Vaping Use  · Vaping status: Never Used  Substance Use Topics  · Alcohol use: Never      Comment: social  · Drug use: Never              Physical Exam     ED Triage Vitals [05/24/24 1336]  Temperature Heart Rate Respirations BP  37.1 °C (98.8 °F) 95 20 (!) 149/101     Pulse Ox Temp Source Heart Rate Source Patient Position  96 % Skin Monitor Sitting     BP Location FiO2 (%)      Left arm --         Physical Exam  Constitutional:       General: She is not in acute distress.     Appearance: She is not ill-appearing or toxic-appearing.   HENT:      Head: Normocephalic and atraumatic.      Nose: Nose normal.      Mouth/Throat:      Mouth: Mucous membranes are moist.   Eyes:      Extraocular Movements: Extraocular movements intact.      Pupils: Pupils are equal, round, and reactive to light.   Cardiovascular:      Rate and Rhythm: Normal rate and regular rhythm.      Pulses: Normal pulses.      Heart sounds: Normal heart sounds. No murmur heard.     No friction rub. No gallop.   Pulmonary:      Effort: Pulmonary effort is normal. No respiratory distress.      Breath sounds: Normal breath sounds. No wheezing, rhonchi or rales.   Chest:      Chest wall: No tenderness.   Abdominal:      General: There is no  "distension.      Palpations: Abdomen is soft.      Tenderness: There is abdominal tenderness. There is guarding.      Comments: Left lower quadrant tenderness to palpation.  Not peritonitic.   Musculoskeletal:         General: No swelling, tenderness, deformity or signs of injury. Normal range of motion.      Cervical back: Normal range of motion. No tenderness.   Skin:     General: Skin is warm and dry.      Capillary Refill: Capillary refill takes less than 2 seconds.      Findings: Rash present.      Comments: Patient with candidal appearing rash in left pannus skin fold.  Patient states she feels that it is \"more red\" than usual.  No exquisite tenderness to palpation.  No crepitus.   Neurological:      General: No focal deficit present.      Mental Status: She is alert and oriented to person, place, and time.      Motor: No weakness.   Psychiatric:         Mood and Affect: Mood normal.         Behavior: Behavior normal.                  ED Course & MDM     ED Course as of 05/25/24 1844  Fri May 24, 2024  1629 Troponin I, High Sensitivity  Negative troponin [MW]  1629 Blood Gas Venous Full Panel(!)  Nonfasting glucose. [MW]  1629 CBC and Auto Differential(!)  Mild leukocytosis [MW]  1630 Comprehensive metabolic panel(!)  Nonfasting glucose [MW]  1630 Magnesium  Normal [MW]     ED Course User Index  [MW] Orion Gonzalez PA-C        Diagnoses as of 05/25/24 1844  Uterine mass  Candidiasis        Medical Decision Making  63-year-old with diabetes presenting with left lower quadrant pain.  She is also nauseated.  On exam she does have tenderness to the left lower quadrant as well as voluntary guarding.  She also has fungal appearing rash over the left inguinal pannus fold.  She states that she has a history of diverticulosis but has never had diverticulitis.  Given her history and concern for potential diverticulitis.  Additionally given her history of diabetes and candidal infection of the groin alternate " diagnosis could include cellulitis.  I have a low suspicion for NSTI given lack of crepitus or exquisite tenderness on exam.     An EKG was ordered and interpreted by me with confirmation by Dr. Jean.  Nsr, no st changes. No stemi. Normal axis.      CT with IV contrast of the abdomen and pelvis was ordered along with labs including CBC, CMP, mag, venous full panel, troponin.     CT scan is reassuring from an infectious standpoint as well as her labs however on scan she was found to have a 7 cm cervical uterine mass.  I discussed this with the patient and her  with her consent to discuss in front of her .  We discussed that there is no way to tell from CT scan what exactly this mass is.  I discussed that this could be a cyst versus malignancy or other sort of mass unspecified.  The patient appropriately was concerned.  I spoke with her about the importance of follow-up with gynecology.  I sent a referral and gave the phone number for her to call.  The patient was feeling better from a pain standpoint and a nausea standpoint.  She was prescribed pain control and antiemetic.  Additionally was prescribed nystatin powder for fungal infection of pannus.                    Procedure  Procedures     Brent Gonzalez PA-C  05/25/24 5957          Review of CT scan,  CT abdomen pelvis w IV contrast  Status: Final result    Study Result    Narrative & Impression  STUDY:  CT Abdomen and Pelvis with IV Contrast; 05/24/2024 at 3:49 PM  INDICATION:  Left lower quadrant abdominal pain. History of diverticulosis.  COMPARISON:  XR right hip and pelvis 07/06/23, 10/21/21.  ACCESSION NUMBER(S):  [id number]  ORDERING CLINICIAN:  BRENT GONZALEZ  TECHNIQUE:  CT of the abdomen and pelvis was performed.  Contiguous axial images  were obtained at 3 mm slice thickness through the abdomen and pelvis.   Coronal and sagittal reconstructions at 3 mm slice thickness were  performed.  Omnipaque-350 75 mL was administered  intravenously.    FINDINGS:  LOWER CHEST:  No cardiomegaly.  No pericardial effusion.  Lung bases are clear.     ABDOMEN:     LIVER:  No hepatomegaly.  Smooth surface contour.  Normal attenuation.     BILE DUCTS:  No intrahepatic or extrahepatic biliary ductal dilatation.     GALLBLADDER:  The gallbladder is surgically absent.  STOMACH:  Moderate size hiatal hernia.     PANCREAS:  No masses or ductal dilatation.     SPLEEN:  No splenomegaly or focal splenic lesion.     ADRENAL GLANDS:  No thickening or nodules.     KIDNEYS AND URETERS:  Kidneys are normal in size and location.  No renal or ureteral  calculi.     PELVIS:     BLADDER:  No abnormalities identified.     REPRODUCTIVE ORGANS:  7 cm mass involves the lower uterine segment and cervix.   Gynecological follow-up recommended.     BOWEL:  Diverticulosis of the colon noted without evidence of acute  diverticulitis. The appendix is visualized and is normal in  appearance.     VESSELS:  No abnormalities identified.  Abdominal aorta is normal in caliber.      PERITONEUM/RETROPERITONEUM/LYMPH NODES:  No free fluid.  No pneumoperitoneum.  No lymphadenopathy.     ABDOMINAL WALL:  No abnormalities identified.  SOFT TISSUES:   No abnormalities identified.     BONES:  No acute fracture or aggressive osseous lesion.  IMPRESSION:  7 cm mass involving the lower uterine segment and cervix.  Gynecological follow-up recommended. Diverticulosis coli. Moderate  size hiatal hernia.  Signed by Demetri Avles MD      New patient 63 years old.  .  3 children.  Works as a .  Non-smoker.  Mother had a hysterectomy at age 29.  Past surgeries include cholecystectomy, endometrial ablation surgery for ovarian cyst and tubal ligation.  She has had a right hip replacement    No menses for years.    Meds for blood pressure diabetes restless leg.  She does have a history of bulging disks and spinal stenosis and she is due for some injections.  Recently put on antibiotics  for UTI    This past Friday she started having pain left of center going into the back area that has improved since then.  She went to the emergency room I did review her emergency room records blood work and CT scan    Is on Mounjaro.  She is overdue for colonoscopy but has been told she has diverticulosis.  She is overdue for Pap smear    Reviewed the CT scan which describes a mass in the lower uterus or cervix.  No vaginal bleeding.  At this point she does need a pelvic ultrasound for better delineation of the mass.  Will have her follow-up for pelvic exam Pap smear and to review ultrasound and any recommendations          Review of Systems   Genitourinary:  Positive for pelvic pain.       Objective   Physical Exam    Assessment/Plan   New onset pelvic pain left of center.  Went to the emergency room.  CT scan shows a mass in the lower uterus cervix.  As she is postmenopausal no bleeding.  Obtain pelvic ultrasound.  Return for pelvic exam Pap smear and to review results and recommendations         Alejandro Daniels MD 05/29/24 9:23 AM

## 2024-06-01 LAB — BACTERIA UR CULT: ABNORMAL

## 2024-06-04 ENCOUNTER — HOSPITAL ENCOUNTER (OUTPATIENT)
Dept: RADIOLOGY | Facility: HOSPITAL | Age: 64
Discharge: HOME | End: 2024-06-04
Payer: COMMERCIAL

## 2024-06-04 ENCOUNTER — HOSPITAL ENCOUNTER (OUTPATIENT)
Dept: PAIN MEDICINE | Facility: HOSPITAL | Age: 64
Discharge: HOME | End: 2024-06-04
Payer: COMMERCIAL

## 2024-06-04 VITALS
WEIGHT: 293 LBS | OXYGEN SATURATION: 97 % | DIASTOLIC BLOOD PRESSURE: 88 MMHG | SYSTOLIC BLOOD PRESSURE: 148 MMHG | HEIGHT: 67 IN | RESPIRATION RATE: 16 BRPM | HEART RATE: 85 BPM | BODY MASS INDEX: 45.99 KG/M2 | TEMPERATURE: 97.3 F

## 2024-06-04 DIAGNOSIS — M54.16 CHRONIC LUMBAR RADICULOPATHY: ICD-10-CM

## 2024-06-04 DIAGNOSIS — M47.817 FACET ARTHROPATHY, LUMBOSACRAL: ICD-10-CM

## 2024-06-04 DIAGNOSIS — M48.062 LUMBAR STENOSIS WITH NEUROGENIC CLAUDICATION: ICD-10-CM

## 2024-06-04 PROCEDURE — 64484 NJX AA&/STRD TFRM EPI L/S EA: CPT | Performed by: ANESTHESIOLOGY

## 2024-06-04 PROCEDURE — 2500000001 HC RX 250 WO HCPCS SELF ADMINISTERED DRUGS (ALT 637 FOR MEDICARE OP)

## 2024-06-04 PROCEDURE — 64483 NJX AA&/STRD TFRM EPI L/S 1: CPT | Performed by: ANESTHESIOLOGY

## 2024-06-04 PROCEDURE — 7100000009 HC PHASE TWO TIME - INITIAL BASE CHARGE: Performed by: ANESTHESIOLOGY

## 2024-06-04 PROCEDURE — 2550000001 HC RX 255 CONTRASTS: Performed by: PHYSICIAN ASSISTANT

## 2024-06-04 PROCEDURE — 99152 MOD SED SAME PHYS/QHP 5/>YRS: CPT | Performed by: ANESTHESIOLOGY

## 2024-06-04 PROCEDURE — 7100000010 HC PHASE TWO TIME - EACH INCREMENTAL 1 MINUTE: Performed by: ANESTHESIOLOGY

## 2024-06-04 PROCEDURE — 2500000004 HC RX 250 GENERAL PHARMACY W/ HCPCS (ALT 636 FOR OP/ED): Performed by: ANESTHESIOLOGY

## 2024-06-04 PROCEDURE — 2500000004 HC RX 250 GENERAL PHARMACY W/ HCPCS (ALT 636 FOR OP/ED): Performed by: NURSE PRACTITIONER

## 2024-06-04 RX ORDER — SODIUM CHLORIDE, SODIUM LACTATE, POTASSIUM CHLORIDE, CALCIUM CHLORIDE 600; 310; 30; 20 MG/100ML; MG/100ML; MG/100ML; MG/100ML
100 INJECTION, SOLUTION INTRAVENOUS CONTINUOUS
Status: DISCONTINUED | OUTPATIENT
Start: 2024-06-04 | End: 2024-06-05 | Stop reason: HOSPADM

## 2024-06-04 RX ORDER — CIPROFLOXACIN 500 MG/1
TABLET ORAL
Status: COMPLETED
Start: 2024-06-04 | End: 2024-06-04

## 2024-06-04 RX ORDER — CIPROFLOXACIN 500 MG/1
500 TABLET ORAL ONCE
Status: DISCONTINUED | OUTPATIENT
Start: 2024-06-04 | End: 2024-06-05 | Stop reason: HOSPADM

## 2024-06-04 RX ORDER — FENTANYL CITRATE 50 UG/ML
INJECTION, SOLUTION INTRAMUSCULAR; INTRAVENOUS AS NEEDED
Status: COMPLETED | OUTPATIENT
Start: 2024-06-04 | End: 2024-06-04

## 2024-06-04 RX ORDER — MIDAZOLAM HYDROCHLORIDE 1 MG/ML
INJECTION, SOLUTION INTRAMUSCULAR; INTRAVENOUS AS NEEDED
Status: COMPLETED | OUTPATIENT
Start: 2024-06-04 | End: 2024-06-04

## 2024-06-04 RX ADMIN — MIDAZOLAM 2 MG: 1 INJECTION INTRAMUSCULAR; INTRAVENOUS at 11:11

## 2024-06-04 RX ADMIN — FENTANYL CITRATE 100 MCG: 50 INJECTION, SOLUTION INTRAMUSCULAR; INTRAVENOUS at 11:11

## 2024-06-04 RX ADMIN — IOHEXOL 5 ML: 240 INJECTION, SOLUTION INTRATHECAL; INTRAVASCULAR; INTRAVENOUS; ORAL at 11:17

## 2024-06-04 RX ADMIN — SODIUM CHLORIDE, POTASSIUM CHLORIDE, SODIUM LACTATE AND CALCIUM CHLORIDE 100 ML/HR: 600; 310; 30; 20 INJECTION, SOLUTION INTRAVENOUS at 10:19

## 2024-06-04 RX ADMIN — CIPROFLOXACIN HYDROCHLORIDE 500 MG: 500 TABLET, FILM COATED ORAL at 10:22

## 2024-06-04 ASSESSMENT — PAIN - FUNCTIONAL ASSESSMENT
PAIN_FUNCTIONAL_ASSESSMENT: 0-10

## 2024-06-04 ASSESSMENT — PAIN SCALES - GENERAL
PAINLEVEL_OUTOF10: 0 - NO PAIN
PAINLEVEL_OUTOF10: 2

## 2024-06-04 ASSESSMENT — PAIN DESCRIPTION - DESCRIPTORS: DESCRIPTORS: DULL

## 2024-06-04 NOTE — INTERVAL H&P NOTE
H&P reviewed. The patient was examined and there are no changes to the H&P.    Seeing GYN for uterine mass. UTI on augmentin for 7 days now, has been compliant, improvement in symptoms. No fevers. Will proceed.

## 2024-06-04 NOTE — OP NOTE
"Date: 2024  OR Location: GEN NON-OR PROCEDURES    Name: Lindsey Herrera \"Marcela\", : 1960, Age: 63 y.o., MRN: [id number], Sex: female    Diagnosis   1. Lumbar stenosis with neurogenic claudication  Transforaminal    Transforaminal      2. Chronic lumbar radiculopathy  Transforaminal    Transforaminal        Preprocedure;  Patient's airway was reevaluated preoperatively prior to receiving IV conscious sedation.  No anatomical adverse airway conditions were noted.  A Mallampati score of 2 was assigned to the patient.  ASA : II    Patient has no changes in health medical management or allergies since last visit on 5/10/2024     Preop diagnosis: Lumbosacral radiculopathy  Postop diagnosis: Same  Operation performed:  #1  Left L4-5 L5-S1 transforaminal epidural steroid injection with fluoroscopic guidance.   #2 epidurography    Procedures   ANESTHESIA: IV conscious sedation  ESTIMATED BLOOD LOSS: None  COMPLICATIONS: None  PROCEDURE: A 22-gauge IV was started in the patient's left hand. The patient was taken to the operating room, placed in the prone position where sterile prep and drapes were done. Supplemental oxygen was given to the patient at 2 L per minute. Blood pressure and pulse ox remained stable throughout the case. Using fluoroscopic guidance, a single 25-gauge 3-1/2 inch spinal needle was inserted into the neural foramen at L4 and L5 on the left.  A cross table lateral identified the needle tip at the vertebral body. Aspiration was negative of cerebrospinal fluid or blood.  One mL of Omnipaque 300 mg contrast media was injected into each spinal needle. The L4-5 and L5-S1 nerve roots and epidural space was visualized in both AP and each lateral views with the fluoroscope. One mL of 2% xylocaine MPF and 6 mg of Celestone was injected into each of the 2 needles. The needles were then removed and sterile bandage was applied along with Neosporin ointment to the puncture sites. The patient was taken to the " recovery room with no neurologic deficits or pain noted. The patient is scheduled for a follow-up visit.    The patient tolerated the procedure very well and was transferred to the Recovery Room in stable condition.  The patient had stable resolution of symptoms.  Patient to follow-up in the [facility] as scheduled.

## 2024-06-04 NOTE — DISCHARGE INSTRUCTIONS
No heavy lifting or strenuous activity today.  Do not sign important documents.  Do not drive for 24 hours.  Keep bandage on for 24 hours.  Keep injection site clean and dry, it may be sore for up to 48 hours.  Do not smoke or drink alcohol for 24 hours.   For relief of pain and swelling, you may apply ice to the injection site area.  If pain persist you may apply moist heat.  Common side effects of steroids include insomnia, facial flushing, increased appetite, headaches, sweating, fluid retention. If you have diabetes your blood sugar may increase. Please monitor your diet and your blood sugar should return to normal in a few days. If your sugar becomes dangerously high, please contact your physician that manages your diabetes for further guidance.  Rare side effects include infection, bleeding, nerve damage. If you have fever, redness or swelling near site, severe pain, please go to the nearest emergency room. For other questions please call office at [phone]. Local anesthetics wear off in several hours and duration of relief vary from person to person.

## 2024-06-05 ENCOUNTER — HOSPITAL ENCOUNTER (OUTPATIENT)
Dept: RADIOLOGY | Facility: CLINIC | Age: 64
Discharge: HOME | End: 2024-06-05
Payer: COMMERCIAL

## 2024-06-05 DIAGNOSIS — R93.5 ABNORMAL CT SCAN, PELVIS: ICD-10-CM

## 2024-06-05 DIAGNOSIS — R10.2 PAIN IN FEMALE PELVIS: ICD-10-CM

## 2024-06-05 PROCEDURE — 76830 TRANSVAGINAL US NON-OB: CPT | Performed by: STUDENT IN AN ORGANIZED HEALTH CARE EDUCATION/TRAINING PROGRAM

## 2024-06-05 PROCEDURE — 76856 US EXAM PELVIC COMPLETE: CPT

## 2024-06-05 PROCEDURE — 76856 US EXAM PELVIC COMPLETE: CPT | Performed by: STUDENT IN AN ORGANIZED HEALTH CARE EDUCATION/TRAINING PROGRAM

## 2024-06-06 ASSESSMENT — PAIN SCALES - GENERAL: PAINLEVEL_OUTOF10: 0 - NO PAIN

## 2024-06-14 PROCEDURE — RXMED WILLOW AMBULATORY MEDICATION CHARGE

## 2024-06-18 ENCOUNTER — APPOINTMENT (OUTPATIENT)
Dept: OBSTETRICS AND GYNECOLOGY | Facility: CLINIC | Age: 64
End: 2024-06-18
Payer: COMMERCIAL

## 2024-06-18 VITALS
HEIGHT: 67 IN | BODY MASS INDEX: 45.99 KG/M2 | WEIGHT: 293 LBS | DIASTOLIC BLOOD PRESSURE: 88 MMHG | SYSTOLIC BLOOD PRESSURE: 142 MMHG

## 2024-06-18 DIAGNOSIS — Z12.4 CERVICAL CANCER SCREENING: ICD-10-CM

## 2024-06-18 DIAGNOSIS — Z12.31 SCREENING MAMMOGRAM FOR BREAST CANCER: ICD-10-CM

## 2024-06-18 PROCEDURE — 3060F POS MICROALBUMINURIA REV: CPT | Performed by: OBSTETRICS & GYNECOLOGY

## 2024-06-18 PROCEDURE — 3077F SYST BP >= 140 MM HG: CPT | Performed by: OBSTETRICS & GYNECOLOGY

## 2024-06-18 PROCEDURE — 99396 PREV VISIT EST AGE 40-64: CPT | Performed by: OBSTETRICS & GYNECOLOGY

## 2024-06-18 PROCEDURE — 99213 OFFICE O/P EST LOW 20 MIN: CPT | Performed by: OBSTETRICS & GYNECOLOGY

## 2024-06-18 PROCEDURE — 4010F ACE/ARB THERAPY RXD/TAKEN: CPT | Performed by: OBSTETRICS & GYNECOLOGY

## 2024-06-18 PROCEDURE — 3079F DIAST BP 80-89 MM HG: CPT | Performed by: OBSTETRICS & GYNECOLOGY

## 2024-06-18 PROCEDURE — 3051F HG A1C>EQUAL 7.0%<8.0%: CPT | Performed by: OBSTETRICS & GYNECOLOGY

## 2024-06-18 PROCEDURE — 88175 CYTOPATH C/V AUTO FLUID REDO: CPT

## 2024-06-18 PROCEDURE — RXMED WILLOW AMBULATORY MEDICATION CHARGE

## 2024-06-18 PROCEDURE — 3008F BODY MASS INDEX DOCD: CPT | Performed by: OBSTETRICS & GYNECOLOGY

## 2024-06-18 PROCEDURE — 87624 HPV HI-RISK TYP POOLED RSLT: CPT

## 2024-06-18 PROCEDURE — 3049F LDL-C 100-129 MG/DL: CPT | Performed by: OBSTETRICS & GYNECOLOGY

## 2024-06-18 NOTE — PROGRESS NOTES
"Subjective   Patient ID: Lindsey Herrera \"Marcela\" is a 63 y.o. female who presents for Well woman visit and Follow-up.  Reviewed recent ultrasound,    US PELVIS TRANSABDOMINAL WITH TRANSVAGINAL  Status: Final result    Study Result    Narrative & Impression  Interpreted By:  Jasiel Carpenter,   STUDY:  US PELVIS TRANSABDOMINAL WITH TRANSVAGINAL;  6/5/2024 1:50 pm      INDICATION:  Signs/Symptoms:Pain in female pelvis, abnormal CT scan showing mass  in the lower uterus/cervix.      COMPARISON:  CT abdomen pelvis 05/24/2024.      ACCESSION NUMBER(S):  [id number]      ORDERING CLINICIAN:  MARGIE HARDING      TECHNIQUE:  Multiple multiplanar static gray scale, color and spectral waveform  sonographic images of the pelvis were obtained. Transabdominal and  endovaginal ultrasound was performed.      FINDINGS:  The study is limited by artifact due to patient body habitus.      UTERUS:  The uterus is anteverted and measures 3.6 x 5.4 x 12.0 cm  (AP/TV/length). There is a round, well-circumscribed, mass-like  structure in the lower uterine segment demonstrating echogenicity  heterogeneous echogenicity measuring up to 7.5 x 7.0 x 6.8 cm (image  8). It demonstrates irregular posterior acoustic shadowing.      ENDOMETRIUM:  The endometrium measures a thickness of 0.6 cm, which is normal.      RIGHT ADNEXA:  The right ovary measures 2.6 cm x 2.9 cm x 3.0 and demonstrates  normal flow. A unilocular hypoechoic to anechoic cyst in the right  ovary measures up to 2.1 cm (image 52).      LEFT ADNEXA:  The left ovary measures 2.6 x 1.7 x 3.4 and demonstrates normal flow.  No gross left adnexal masses are seen, no hydrosalpinx. An anechoic  unilocular cyst in the left ovary measures up to 2.7 cm (image 60).      CUL DE SAC:  No gross free fluid is seen in the pelvic cul-de-sac.      IMPRESSION:  1. Round, heterogeneous, well-circumscribed masslike structure in the  lower uterine segment is concerning for malignancy of the uterus or  cervix " "although could represent a large fibroid. No prior studies are  available for review. Further evaluation with MRI of the pelvis is  recommended.  2. Unilocular cysts in both ovaries measuring up to 2.1 cm on the  right and 2.7 cm on the left. These could also be better evaluated on  MRI. Sonographic follow-up in 1 year is recommended.      MACRO:  None      Signed by: Jasiel Carpenter 6/6/2024 6:04 PM  Dictation workstation:   [id number]    Review of my previous note,       Alejandro Daniels MD  Physician  Obstetrics     Progress Notes     Signed     Encounter Date: 5/29/2024    Signed     Expand All Collapse All       Subjective   Patient ID: Lindsey Herrera \"Marcela\" is a 63 y.o. female who presents for Follow up CT.  Recently emergency room, note reviewed     Orion Gnozalez PA-C   Physician Assistant  Emergency Medicine     ED Provider Notes     Signed     Date of Service: 5/24/2024 12:36 PM     Signed      Expand All Collapse All        HPI      Chief Complaint  Patient presents with  ·            Flank Pain                          LEFT flank pain radiating to the front since 0630.        63-year-old female with a history of diabetes who presents with chief complaint of left lower quadrant pain.  Patient states she woke up this morning with the pain.  She states that has slowly worsened throughout the day.  She also endorses nausea.  No vomiting.  She does have chronic constipation however she did have a small bowel movement this morning.  She states she was diagnosed with diverticulosis on colonoscopy which she states she is \"overdue for her next colonoscopy\".  She has not noticed any blood or mucus in the stool.  She does not feel that she has had any fever or chills at home.  She does not endorse any dysuria or hematuria.                          No data recorded                          Patient History      Medical History  Past Medical History:  Diagnosis         Date  ·            Essential (primary) " hypertension         09/20/2022              Benign essential hypertension  ·            Gastro-esophageal reflux disease without esophagitis          03/02/2022              GERD (gastroesophageal reflux disease)  ·            Hyperglycemia, unspecified    06/30/2021              Hyperglycemia  ·            Hyperlipidemia, unspecified    09/20/2022              Dyslipidemia  ·            Other muscle spasm    12/01/2022              Muscle spasm  ·            Presence of right artificial hip joint     06/30/2022              Presence of right artificial hip joint  ·            Proteinuria, unspecified          09/20/2022              Proteinuria  ·            Restless legs syndrome           12/01/2022              Restless legs syndrome  ·            Vitamin D deficiency, unspecified       09/01/2022              Vitamin D deficiency           Surgical History  Past Surgical History:  Procedure        Laterality          Date  ·            OTHER SURGICAL HISTORY                04/04/2019              Cholecystectomy  ·            OTHER SURGICAL HISTORY                04/04/2019              Tubal ligation  ·            OTHER SURGICAL HISTORY                04/04/2019              Hip replacement  ·            OTHER SURGICAL HISTORY                04/04/2019              Tonsillectomy  ·            OTHER SURGICAL HISTORY                07/01/2021              Complete colonoscopy  ·            OTHER SURGICAL HISTORY                07/01/2021              Complete colonoscopy  ·            OTHER SURGICAL HISTORY                07/01/2021              Complete colonoscopy           Family History  Family History  Problem           Relation           Name   Age of Onset  ·            Diabetes          Mother                ·            COPD   Mother                ·            Thyroid disease           Mother                         Social History        Tobacco Use  ·            Smoking status:           Never                           Passive exposure:        Never  ·            Smokeless tobacco:     Never  Vaping Use  ·            Vaping status:  Never Used  Substance Use Topics  ·            Alcohol use:     Never                          Comment: social  ·            Drug use:         Never              Physical Exam      ED Triage Vitals [05/24/24 1336]  Temperature    Heart Rate       Respirations     BP  37.1 °C (98.8 °F)           95         20         (!) 149/101     Pulse Ox           Temp Source   Heart Rate Source       Patient Position  96 %     Skin      Monitor           Sitting     BP Location      FiO2 (%)                          Left arm           --                            Physical Exam  Constitutional:       General: She is not in acute distress.     Appearance: She is not ill-appearing or toxic-appearing.   HENT:      Head: Normocephalic and atraumatic.      Nose: Nose normal.      Mouth/Throat:      Mouth: Mucous membranes are moist.   Eyes:      Extraocular Movements: Extraocular movements intact.      Pupils: Pupils are equal, round, and reactive to light.   Cardiovascular:      Rate and Rhythm: Normal rate and regular rhythm.      Pulses: Normal pulses.      Heart sounds: Normal heart sounds. No murmur heard.     No friction rub. No gallop.   Pulmonary:      Effort: Pulmonary effort is normal. No respiratory distress.      Breath sounds: Normal breath sounds. No wheezing, rhonchi or rales.   Chest:      Chest wall: No tenderness.   Abdominal:      General: There is no distension.      Palpations: Abdomen is soft.      Tenderness: There is abdominal tenderness. There is guarding.      Comments: Left lower quadrant tenderness to palpation.  Not peritonitic.   Musculoskeletal:         General: No swelling, tenderness, deformity or signs of injury. Normal range of motion.      Cervical back: Normal range of motion. No tenderness.   Skin:     General: Skin is warm and dry.      Capillary Refill: Capillary  "refill takes less than 2 seconds.      Findings: Rash present.      Comments: Patient with candidal appearing rash in left pannus skin fold.  Patient states she feels that it is \"more red\" than usual.  No exquisite tenderness to palpation.  No crepitus.   Neurological:      General: No focal deficit present.      Mental Status: She is alert and oriented to person, place, and time.      Motor: No weakness.   Psychiatric:         Mood and Affect: Mood normal.         Behavior: Behavior normal.                  ED Course & MDM      ED Course as of 05/25/24 1844  Fri May 24, 2024  1629     Troponin I, High Sensitivity  Negative troponin [MW]  1629     Blood Gas Venous Full Panel(!)  Nonfasting glucose. [MW]  1629     CBC and Auto Differential(!)  Mild leukocytosis [MW]  1630     Comprehensive metabolic panel(!)  Nonfasting glucose [MW]  1630     Magnesium  Normal [MW]     ED Course User Index  [MW] Orion Gonzalez PA-C        Diagnoses as of 05/25/24 1844  Uterine mass  Candidiasis        Medical Decision Making  63-year-old with diabetes presenting with left lower quadrant pain.  She is also nauseated.  On exam she does have tenderness to the left lower quadrant as well as voluntary guarding.  She also has fungal appearing rash over the left inguinal pannus fold.  She states that she has a history of diverticulosis but has never had diverticulitis.  Given her history and concern for potential diverticulitis.  Additionally given her history of diabetes and candidal infection of the groin alternate diagnosis could include cellulitis.  I have a low suspicion for NSTI given lack of crepitus or exquisite tenderness on exam.     An EKG was ordered and interpreted by me with confirmation by Dr. Jean.  Nsr, no st changes. No stemi. Normal axis.      CT with IV contrast of the abdomen and pelvis was ordered along with labs including CBC, CMP, mag, venous full panel, troponin.     CT scan is reassuring from an infectious " standpoint as well as her labs however on scan she was found to have a 7 cm cervical uterine mass.  I discussed this with the patient and her  with her consent to discuss in front of her .  We discussed that there is no way to tell from CT scan what exactly this mass is.  I discussed that this could be a cyst versus malignancy or other sort of mass unspecified.  The patient appropriately was concerned.  I spoke with her about the importance of follow-up with gynecology.  I sent a referral and gave the phone number for her to call.  The patient was feeling better from a pain standpoint and a nausea standpoint.  She was prescribed pain control and antiemetic.  Additionally was prescribed nystatin powder for fungal infection of pannus.                    Procedure  Procedures     Brent Gonzalez PA-C  05/25/24 1847              Review of CT scan,  CT abdomen pelvis w IV contrast  Status: Final result     Study Result     Narrative & Impression  STUDY:  CT Abdomen and Pelvis with IV Contrast; 05/24/2024 at 3:49 PM  INDICATION:  Left lower quadrant abdominal pain. History of diverticulosis.  COMPARISON:  XR right hip and pelvis 07/06/23, 10/21/21.  ACCESSION NUMBER(S):  [id number]  ORDERING CLINICIAN:  BRENT GONZALEZ  TECHNIQUE:  CT of the abdomen and pelvis was performed.  Contiguous axial images  were obtained at 3 mm slice thickness through the abdomen and pelvis.   Coronal and sagittal reconstructions at 3 mm slice thickness were  performed.  Omnipaque-350 75 mL was administered intravenously.    FINDINGS:  LOWER CHEST:  No cardiomegaly.  No pericardial effusion.  Lung bases are clear.     ABDOMEN:     LIVER:  No hepatomegaly.  Smooth surface contour.  Normal attenuation.     BILE DUCTS:  No intrahepatic or extrahepatic biliary ductal dilatation.     GALLBLADDER:  The gallbladder is surgically absent.  STOMACH:  Moderate size hiatal hernia.     PANCREAS:  No masses or ductal dilatation.      SPLEEN:  No splenomegaly or focal splenic lesion.     ADRENAL GLANDS:  No thickening or nodules.     KIDNEYS AND URETERS:  Kidneys are normal in size and location.  No renal or ureteral  calculi.     PELVIS:     BLADDER:  No abnormalities identified.     REPRODUCTIVE ORGANS:  7 cm mass involves the lower uterine segment and cervix.   Gynecological follow-up recommended.     BOWEL:  Diverticulosis of the colon noted without evidence of acute  diverticulitis. The appendix is visualized and is normal in  appearance.     VESSELS:  No abnormalities identified.  Abdominal aorta is normal in caliber.      PERITONEUM/RETROPERITONEUM/LYMPH NODES:  No free fluid.  No pneumoperitoneum.  No lymphadenopathy.     ABDOMINAL WALL:  No abnormalities identified.  SOFT TISSUES:   No abnormalities identified.     BONES:  No acute fracture or aggressive osseous lesion.  IMPRESSION:  7 cm mass involving the lower uterine segment and cervix.  Gynecological follow-up recommended. Diverticulosis coli. Moderate  size hiatal hernia.  Signed by Demetri Alves MD        New patient 63 years old.  .  3 children.  Works as a .  Non-smoker.  Mother had a hysterectomy at age 29.  Past surgeries include cholecystectomy, endometrial ablation surgery for ovarian cyst and tubal ligation.  She has had a right hip replacement     No menses for years.     Meds for blood pressure diabetes restless leg.  She does have a history of bulging disks and spinal stenosis and she is due for some injections.  Recently put on antibiotics for UTI     This past Friday she started having pain left of center going into the back area that has improved since then.  She went to the emergency room I did review her emergency room records blood work and CT scan     Is on Mounjaro.  She is overdue for colonoscopy but has been told she has diverticulosis.  She is overdue for Pap smear     Reviewed the CT scan which describes a mass in the lower uterus or  cervix.  No vaginal bleeding.  At this point she does need a pelvic ultrasound for better delineation of the mass.  Will have her follow-up for pelvic exam Pap smear and to review ultrasound and any recommendations              Review of Systems   Genitourinary:  Positive for pelvic pain.               Objective   Physical Exam           Assessment/Plan   New onset pelvic pain left of center.  Went to the emergency room.  CT scan shows a mass in the lower uterus cervix.  As she is postmenopausal no bleeding.  Obtain pelvic ultrasound.  Return for pelvic exam Pap smear and to review results and recommendations        Alejandro Daniels MD 05/29/24 9:23 AM           For annual exam today.  No postmenopausal bleeding.  Breast abdominal pelvic exams performed.  Uterus does feel irregularly enlarged.  Difficulty visualizing the cervical os but Pap was obtained from the visualized part of the cervix.  Has mammogram scheduled tomorrow    In view of mass being described as possibly cancerous on imaging.  Her pain has resolved.  No postmenopausal bleeding but will consult with GYN oncology regarding hysterectomy.  Unable to biopsy the mass.  She would like to make sure it is not malignant.  Likely for hysterectomy        Review of Systems   All other systems reviewed and are negative.      Objective   Physical Exam  Constitutional:       Appearance: Normal appearance. She is obese.   Chest:   Breasts:     Right: Normal.      Left: Normal.   Genitourinary:     General: Normal vulva.      Vagina: Normal.      Cervix: Normal.      Uterus: Enlarged.       Adnexa: Right adnexa normal and left adnexa normal.   Neurological:      Mental Status: She is alert.         Assessment/Plan   Annual exam.  Pap breast exam performed.  Ultrasound reviewed.  There is a masslike structure in the lower segment.  Will do pelvic exam her uterus is irregularly enlarged.  Discussed that we do not know whether this is benign or malignant.  Would recommend  likely hysterectomy and in view of uncertainty will refer to GYN oncology .  Appointment made for July 18 at 11 AM with Dr. Payton Daniels MD 06/18/24 3:39 PM

## 2024-06-19 ENCOUNTER — PHARMACY VISIT (OUTPATIENT)
Dept: PHARMACY | Facility: CLINIC | Age: 64
End: 2024-06-19
Payer: COMMERCIAL

## 2024-06-20 ENCOUNTER — HOSPITAL ENCOUNTER (OUTPATIENT)
Dept: RADIOLOGY | Facility: HOSPITAL | Age: 64
Discharge: HOME | End: 2024-06-20
Payer: COMMERCIAL

## 2024-06-20 VITALS — BODY MASS INDEX: 45.83 KG/M2 | WEIGHT: 292 LBS | HEIGHT: 67 IN

## 2024-06-20 DIAGNOSIS — Z12.31 SCREENING MAMMOGRAM FOR BREAST CANCER: ICD-10-CM

## 2024-06-20 PROCEDURE — 77067 SCR MAMMO BI INCL CAD: CPT

## 2024-06-20 PROCEDURE — 77063 BREAST TOMOSYNTHESIS BI: CPT | Performed by: RADIOLOGY

## 2024-06-20 PROCEDURE — RXMED WILLOW AMBULATORY MEDICATION CHARGE

## 2024-06-20 PROCEDURE — 77067 SCR MAMMO BI INCL CAD: CPT | Performed by: RADIOLOGY

## 2024-06-28 ENCOUNTER — HOSPITAL ENCOUNTER (OUTPATIENT)
Dept: RADIOLOGY | Facility: HOSPITAL | Age: 64
Discharge: HOME | End: 2024-06-28
Payer: COMMERCIAL

## 2024-06-28 ENCOUNTER — PHARMACY VISIT (OUTPATIENT)
Dept: PHARMACY | Facility: CLINIC | Age: 64
End: 2024-06-28
Payer: COMMERCIAL

## 2024-06-28 DIAGNOSIS — R92.8 ABNORMAL MAMMOGRAM: ICD-10-CM

## 2024-06-28 PROCEDURE — 76642 ULTRASOUND BREAST LIMITED: CPT | Mod: RT

## 2024-07-08 NOTE — PROGRESS NOTES
Patient ID: Marcela Herrera is a 63 y.o. female.  Referring Physician: No referring provider defined for this encounter.  Primary Care Provider: Rachell Gonzalez MD      Subjective    HPI  63 y.o. presenting with adnexal and cervical mass.   Reports Memorial Day weekend she woke up in pain in the lower abdomen. This prompted her to go to the ED. She underwent imaging which revealed a cervical and adnexal masses. Her pain is now resolved. Notes normal bowel movements, bladder and appetite are normal baseline bloating and lower extremity edema. Discussed potential surgery to treat in detail and she asked about the prospect of having a hiatal hernia surgery simultaneously and if she needs HRT.    PMH:  HTN  RLS  Low back pain  DM takes Mounjaro  Hiatal hernia    PSH:  Endometrial ablation and ovarian cystectomy   Cholecystectomy   Right hip replacement 2017  Tonsillectomy  Tubal ligation    OBHx:  The patient is a   x2. She underwent menopause more than 5 years ago denies PMB. She used COCs for less than 1 year. She did not use HRT.    Social:  They uses alcohol and tobacco socially and denies recreational drug use. The patient lives at home with her  and in laws. The patient works as a .     FamHx:  Mother Hx of cervical cancer   Their history is otherwise negative for a history of breast, ovarian, uterine, colon, pancreatic, and GI cancer.     Screening:  Cervical cancer: 2024, NILM HPV neg  Mammogram:  BIRADS 2  Colonoscopy: over 5 years ago results showed diverticulosis      Review of Systems - Oncology     Objective   BSA: 2.52 meters squared  /87 (BP Location: Right arm, Patient Position: Sitting, BP Cuff Size: Large adult)   Pulse 79   Temp 36.4 °C (97.5 °F) (Temporal)   Resp 16   Wt 134 kg (295 lb 3.1 oz)   SpO2 94%   BMI 46.23 kg/m²      Family History   Problem Relation Name Age of Onset    Diabetes Mother      COPD Mother      Thyroid disease Mother          Lindsey Herrera  reports that she has never smoked. She has never been exposed to tobacco smoke. She has never used smokeless tobacco.  She  reports no history of alcohol use.  She  reports no history of drug use.    Physical Exam  Constitutional:       General: She is not in acute distress.     Appearance: Normal appearance. She is not toxic-appearing.   HENT:      Head: Normocephalic.      Mouth/Throat:      Mouth: Mucous membranes are moist.      Pharynx: Oropharynx is clear.   Eyes:      Extraocular Movements: Extraocular movements intact.      Conjunctiva/sclera: Conjunctivae normal.      Pupils: Pupils are equal, round, and reactive to light.   Cardiovascular:      Rate and Rhythm: Normal rate and regular rhythm.      Heart sounds: Normal heart sounds. No murmur heard.     No friction rub. No gallop.   Pulmonary:      Effort: Pulmonary effort is normal.      Breath sounds: Normal breath sounds. No wheezing or rhonchi.   Abdominal:      General: Bowel sounds are normal. There is no distension.      Palpations: Abdomen is soft.      Tenderness: There is no abdominal tenderness.      Comments: Well healed laparoscopic incision sites noted on the abdomen   Genitourinary:     Comments: Normal external genitalia no lesions or masses are appreciated  Speculum Exam: Smooth vagina cervix anterior lip is visible however posterior is not well visualized secondary to pelvic mass.  Bimanual examination: Well circumscribed grounded 6 cm mass that is mobile occupying the lower uterine segment and cervix, does not extend to the pelvic side walls, smooth vagina no lesions or masses are appreciated, smooth cervix no other lesion or masses.      Musculoskeletal:         General: Normal range of motion.      Cervical back: Normal range of motion.   Skin:     General: Skin is warm.   Neurological:      General: No focal deficit present.      Mental Status: She is alert and oriented to person, place, and time.   Psychiatric:          Mood and Affect: Mood normal.         Behavior: Behavior normal.         Performance Status:  Asymptomatic    Assessment/Plan      Oncology History    No history exists.   63 y.o. with pelvic mass and ?uterinocervical mass presenting for management    # Pelvic mass  - We discussed the possible etiologies of a pelvic mass including benign, borderline, and malignant.   - Imaging was reviewed and discussed with the patient  - Tumor markers will be obtained (Ca 125, CA 19-9, CEA)  - Discussed plan for removal of laparoscopic removal of pelvic mass, TLH/BSO, possible staging procedure, possible mini-lap, possible ex-lap  - Risks of surgery, expected hospital stay, and anticipated recovery were discussed  - She will need PAT  - She will be a candidate for same day DC if surgery can be performed laparoscopically     # PONV  - Will discuss with anesthesia      Scribe Attestation  By signing my name below, I, Ijeoma Solo   attest that this documentation has been prepared under the direction and in the presence of Bessy Cain MD.     Provider Attestation - Scribe documentation    All medical record entries made by the Scribe were at my direction and personally dictated by me. I have reviewed the chart and agree that the record accurately reflects my personal performance of the history, physical exam, discussion and plan.    Bessy Cain MD

## 2024-07-09 ENCOUNTER — OFFICE VISIT (OUTPATIENT)
Dept: GYNECOLOGIC ONCOLOGY | Facility: CLINIC | Age: 64
End: 2024-07-09
Payer: COMMERCIAL

## 2024-07-09 ENCOUNTER — PREP FOR PROCEDURE (OUTPATIENT)
Dept: OPERATING ROOM | Facility: HOSPITAL | Age: 64
End: 2024-07-09

## 2024-07-09 VITALS
TEMPERATURE: 97.5 F | WEIGHT: 293 LBS | RESPIRATION RATE: 16 BRPM | DIASTOLIC BLOOD PRESSURE: 87 MMHG | HEART RATE: 79 BPM | OXYGEN SATURATION: 94 % | BODY MASS INDEX: 46.23 KG/M2 | SYSTOLIC BLOOD PRESSURE: 136 MMHG

## 2024-07-09 DIAGNOSIS — N88.8 CERVICAL MASS: ICD-10-CM

## 2024-07-09 DIAGNOSIS — R19.00 PELVIC MASS: Primary | ICD-10-CM

## 2024-07-09 PROCEDURE — 1036F TOBACCO NON-USER: CPT | Performed by: STUDENT IN AN ORGANIZED HEALTH CARE EDUCATION/TRAINING PROGRAM

## 2024-07-09 PROCEDURE — 99215 OFFICE O/P EST HI 40 MIN: CPT | Performed by: STUDENT IN AN ORGANIZED HEALTH CARE EDUCATION/TRAINING PROGRAM

## 2024-07-09 PROCEDURE — 36415 COLL VENOUS BLD VENIPUNCTURE: CPT | Performed by: STUDENT IN AN ORGANIZED HEALTH CARE EDUCATION/TRAINING PROGRAM

## 2024-07-09 PROCEDURE — 82378 CARCINOEMBRYONIC ANTIGEN: CPT | Performed by: STUDENT IN AN ORGANIZED HEALTH CARE EDUCATION/TRAINING PROGRAM

## 2024-07-09 PROCEDURE — 3051F HG A1C>EQUAL 7.0%<8.0%: CPT | Performed by: STUDENT IN AN ORGANIZED HEALTH CARE EDUCATION/TRAINING PROGRAM

## 2024-07-09 PROCEDURE — 86301 IMMUNOASSAY TUMOR CA 19-9: CPT | Performed by: STUDENT IN AN ORGANIZED HEALTH CARE EDUCATION/TRAINING PROGRAM

## 2024-07-09 PROCEDURE — 3079F DIAST BP 80-89 MM HG: CPT | Performed by: STUDENT IN AN ORGANIZED HEALTH CARE EDUCATION/TRAINING PROGRAM

## 2024-07-09 PROCEDURE — 3060F POS MICROALBUMINURIA REV: CPT | Performed by: STUDENT IN AN ORGANIZED HEALTH CARE EDUCATION/TRAINING PROGRAM

## 2024-07-09 PROCEDURE — 4010F ACE/ARB THERAPY RXD/TAKEN: CPT | Performed by: STUDENT IN AN ORGANIZED HEALTH CARE EDUCATION/TRAINING PROGRAM

## 2024-07-09 PROCEDURE — 3075F SYST BP GE 130 - 139MM HG: CPT | Performed by: STUDENT IN AN ORGANIZED HEALTH CARE EDUCATION/TRAINING PROGRAM

## 2024-07-09 PROCEDURE — 3008F BODY MASS INDEX DOCD: CPT | Performed by: STUDENT IN AN ORGANIZED HEALTH CARE EDUCATION/TRAINING PROGRAM

## 2024-07-09 PROCEDURE — 3049F LDL-C 100-129 MG/DL: CPT | Performed by: STUDENT IN AN ORGANIZED HEALTH CARE EDUCATION/TRAINING PROGRAM

## 2024-07-09 PROCEDURE — 86304 IMMUNOASSAY TUMOR CA 125: CPT | Performed by: STUDENT IN AN ORGANIZED HEALTH CARE EDUCATION/TRAINING PROGRAM

## 2024-07-09 PROCEDURE — 99205 OFFICE O/P NEW HI 60 MIN: CPT | Performed by: STUDENT IN AN ORGANIZED HEALTH CARE EDUCATION/TRAINING PROGRAM

## 2024-07-09 RX ORDER — CELECOXIB 200 MG/1
400 CAPSULE ORAL ONCE
OUTPATIENT
Start: 2024-07-09 | End: 2024-07-09

## 2024-07-09 RX ORDER — ACETAMINOPHEN 325 MG/1
975 TABLET ORAL ONCE
OUTPATIENT
Start: 2024-07-09 | End: 2024-07-09

## 2024-07-09 RX ORDER — HEPARIN SODIUM 5000 [USP'U]/ML
5000 INJECTION, SOLUTION INTRAVENOUS; SUBCUTANEOUS ONCE
OUTPATIENT
Start: 2024-07-09 | End: 2024-07-09

## 2024-07-09 RX ORDER — GABAPENTIN 600 MG/1
600 TABLET ORAL ONCE
OUTPATIENT
Start: 2024-07-09 | End: 2024-07-09

## 2024-07-09 ASSESSMENT — PAIN SCALES - GENERAL: PAINLEVEL: 0-NO PAIN

## 2024-07-10 ENCOUNTER — TELEPHONE (OUTPATIENT)
Dept: GYNECOLOGIC ONCOLOGY | Facility: HOSPITAL | Age: 64
End: 2024-07-10
Payer: COMMERCIAL

## 2024-07-10 LAB
CANCER AG125 SERPL-ACNC: 15.5 U/ML (ref 0–30.2)
CANCER AG19-9 SERPL-ACNC: 10.13 U/ML
CEA SERPL-MCNC: 0.9 UG/L

## 2024-07-10 NOTE — TELEPHONE ENCOUNTER
I left a message for the patient that her blood work was normal per her physician. I told her to call the office with any questions or concerns.

## 2024-07-15 ENCOUNTER — APPOINTMENT (OUTPATIENT)
Dept: ENDOCRINOLOGY | Facility: CLINIC | Age: 64
End: 2024-07-15
Payer: COMMERCIAL

## 2024-07-15 VITALS
BODY MASS INDEX: 46.2 KG/M2 | HEART RATE: 83 BPM | SYSTOLIC BLOOD PRESSURE: 151 MMHG | WEIGHT: 293 LBS | DIASTOLIC BLOOD PRESSURE: 99 MMHG

## 2024-07-15 DIAGNOSIS — E11.9 TYPE 2 DIABETES MELLITUS WITHOUT COMPLICATION, WITHOUT LONG-TERM CURRENT USE OF INSULIN (MULTI): ICD-10-CM

## 2024-07-15 LAB — POC HEMOGLOBIN A1C: 6.7 % (ref 4.2–6.5)

## 2024-07-15 PROCEDURE — 3080F DIAST BP >= 90 MM HG: CPT | Performed by: INTERNAL MEDICINE

## 2024-07-15 PROCEDURE — 3049F LDL-C 100-129 MG/DL: CPT | Performed by: INTERNAL MEDICINE

## 2024-07-15 PROCEDURE — 4010F ACE/ARB THERAPY RXD/TAKEN: CPT | Performed by: INTERNAL MEDICINE

## 2024-07-15 PROCEDURE — 3051F HG A1C>EQUAL 7.0%<8.0%: CPT | Performed by: INTERNAL MEDICINE

## 2024-07-15 PROCEDURE — 3077F SYST BP >= 140 MM HG: CPT | Performed by: INTERNAL MEDICINE

## 2024-07-15 PROCEDURE — 99213 OFFICE O/P EST LOW 20 MIN: CPT | Performed by: INTERNAL MEDICINE

## 2024-07-15 PROCEDURE — RXMED WILLOW AMBULATORY MEDICATION CHARGE

## 2024-07-15 PROCEDURE — 3008F BODY MASS INDEX DOCD: CPT | Performed by: INTERNAL MEDICINE

## 2024-07-15 PROCEDURE — 1036F TOBACCO NON-USER: CPT | Performed by: INTERNAL MEDICINE

## 2024-07-15 PROCEDURE — 3060F POS MICROALBUMINURIA REV: CPT | Performed by: INTERNAL MEDICINE

## 2024-07-15 PROCEDURE — 83036 HEMOGLOBIN GLYCOSYLATED A1C: CPT | Performed by: INTERNAL MEDICINE

## 2024-07-15 ASSESSMENT — ENCOUNTER SYMPTOMS
COUGH: 0
SHORTNESS OF BREATH: 0
FREQUENCY: 0
POLYDIPSIA: 0
NERVOUS/ANXIOUS: 0
DIARRHEA: 0
NAUSEA: 0
HEADACHES: 0
APPETITE CHANGE: 0
ARTHRALGIAS: 0
VOMITING: 0
SORE THROAT: 0
ABDOMINAL PAIN: 0
FEVER: 0
CONSTIPATION: 0

## 2024-07-15 NOTE — PROGRESS NOTES
"History Of Present Illness  Lindsey Herrera \"Marcela\" is a 63 y.o. female     Duration of type 2 diabetes mellitus:  8 years  Complications:  Albuminuria    Pending hysterectomy 7/29/24 for uterine mass  Dr. EVE Fairbanks 5 mg/week  Weight loss 6 lbs    Patient is testing glucose less than daily    Last eye exam:  June 2024    Past Medical History  She has a past medical history of Essential (primary) hypertension (09/20/2022), Gastro-esophageal reflux disease without esophagitis (03/02/2022), Hyperglycemia, unspecified (06/30/2021), Hyperlipidemia, unspecified (09/20/2022), Other muscle spasm (12/01/2022), Presence of right artificial hip joint (06/30/2022), Proteinuria, unspecified (09/20/2022), Restless legs syndrome (12/01/2022), and Vitamin D deficiency, unspecified (09/01/2022).    Surgical History  She has a past surgical history that includes Other surgical history (04/04/2019); Other surgical history (04/04/2019); Other surgical history (04/04/2019); Other surgical history (04/04/2019); Other surgical history (07/01/2021); Other surgical history (07/01/2021); and Other surgical history (07/01/2021).     Social History  She reports that she has never smoked. She has never been exposed to tobacco smoke. She has never used smokeless tobacco. She reports that she does not drink alcohol and does not use drugs.    Family History  Family History   Problem Relation Name Age of Onset    Diabetes Mother      COPD Mother      Thyroid disease Mother         Medications  Current Outpatient Medications   Medication Instructions    acetaminophen (Tylenol) 500 mg tablet oral    amLODIPine (NORVASC) 10 mg, oral, Daily    losartan (Cozaar) 100 mg tablet TAKE 1 TABLET BY MOUTH DAILY    Mounjaro 5 mg, subcutaneous, Every 7 days    naproxen (NAPROSYN) 250 mg, oral, 2 times daily (morning and late afternoon)    omeprazole OTC (PRILOSEC OTC) 20 mg, oral, Daily before breakfast, Do not crush, chew, or split.    OneTouch " Delica Plus Lancet 33 gauge misc For glucose testing once daily    OneTouch Verio Reflect Meter mis     OneTouch Verio test strips strip For glucose testing once daily    rOPINIRole (REQUIP) 1 mg, oral, Nightly    tiZANidine (Zanaflex) 2 mg tablet Take 1 tablet by mouth once daily if needed       Allergies  Metoprolol, Ace inhibitors, Atorvastatin, Atorvastatin calcium, Gabapentin, Metformin, Pseudoephedrine hcl, Triamterene-hydrochlorothiazid, and Erythromycin    Review of Systems   Constitutional:  Negative for appetite change and fever.   HENT:  Negative for sore throat.         Denies dry mouth   Eyes:  Negative for visual disturbance.   Respiratory:  Negative for cough and shortness of breath.    Cardiovascular:  Negative for chest pain.   Gastrointestinal:  Negative for abdominal pain, constipation, diarrhea, nausea and vomiting.   Endocrine: Negative for polydipsia and polyuria.   Genitourinary:  Negative for frequency.   Musculoskeletal:  Negative for arthralgias.   Skin:  Negative for rash.   Neurological:  Negative for headaches.   Psychiatric/Behavioral:  The patient is not nervous/anxious.          Last Recorded Vitals  Blood pressure (!) 151/99, pulse 83, weight 134 kg (295 lb).    Physical Exam  Constitutional:       General: She is not in acute distress.  HENT:      Head: Normocephalic.   Eyes:      Extraocular Movements: Extraocular movements intact.   Neurological:      Mental Status: She is alert.   Psychiatric:         Mood and Affect: Affect normal.          Relevant Results  Glucose (mg/dL)   Date Value   05/24/2024 135 (H)   03/09/2024 144 (H)   03/01/2023 160 (H)   09/03/2022 168 (H)   03/02/2022 151 (H)     POC HEMOGLOBIN A1c (%)   Date Value   07/15/2024 6.7 (A)     Hemoglobin A1C (%)   Date Value   03/09/2024 7.4 (H)   07/08/2023 7.6 (A)   03/01/2023 7.9 (A)   09/03/2022 7.8 (A)     Bicarbonate (mmol/L)   Date Value   05/24/2024 24   03/09/2024 23   03/01/2023 22   09/03/2022 24    03/02/2022 27     Urea Nitrogen (mg/dL)   Date Value   05/24/2024 14   03/09/2024 16   03/01/2023 22   09/03/2022 20   03/02/2022 11     Creatinine (mg/dL)   Date Value   05/24/2024 0.75   03/09/2024 0.56   03/01/2023 0.78   09/03/2022 0.67   03/02/2022 0.61     Lab Results   Component Value Date    CHOL 207 (H) 03/09/2024    CHOL 233 (H) 09/03/2022    CHOL 217 (H) 09/25/2021     Lab Results   Component Value Date    HDL 39.1 03/09/2024    HDL 49.0 09/03/2022    HDL 46.0 09/25/2021     Lab Results   Component Value Date    LDLCALC 125 (H) 03/09/2024    LDLCALC 130 09/06/2018    LDLCALC 129 01/09/2018     Lab Results   Component Value Date    TRIG 215 (H) 03/09/2024    TRIG 231 (H) 09/03/2022    TRIG 208 (H) 09/25/2021     Lab Results   Component Value Date    TSH 2.71 03/09/2024       IMPRESSION  TYPE 2 DIABETES MELLITUS  Tolerating and responding to Mounjaro       RECOMMENDATIONS  Continue Mounjaro 5 mg/week for now  After surgery, will increase Mounjaro to 7.5 mg/week    Follow up 3-4 months  Repeat A1c at next appointment

## 2024-07-15 NOTE — PATIENT INSTRUCTIONS
RECOMMENDATIONS  Continue Mounjaro 5 mg/week for now  After surgery, will increase Mounjaro to 7.5 mg/week    Follow up 3-4 months  Repeat A1c at next appointment

## 2024-07-17 ENCOUNTER — TELEPHONE (OUTPATIENT)
Dept: PAIN MEDICINE | Facility: HOSPITAL | Age: 64
End: 2024-07-17
Payer: COMMERCIAL

## 2024-07-17 ENCOUNTER — PHARMACY VISIT (OUTPATIENT)
Dept: PHARMACY | Facility: CLINIC | Age: 64
End: 2024-07-17
Payer: COMMERCIAL

## 2024-07-17 NOTE — TELEPHONE ENCOUNTER
Pt re-scheduled PP FU with [facility] this month until 08/30 she is having surgery. Pt states she left a  message asking for a refill on her Topiramate 25mg to [facility] pharmacy and has not heard back.     Maribel Bearden MA

## 2024-07-18 ENCOUNTER — APPOINTMENT (OUTPATIENT)
Dept: GYNECOLOGIC ONCOLOGY | Facility: CLINIC | Age: 64
End: 2024-07-18
Payer: COMMERCIAL

## 2024-07-19 ENCOUNTER — CLINICAL SUPPORT (OUTPATIENT)
Dept: PREADMISSION TESTING | Facility: HOSPITAL | Age: 64
End: 2024-07-19
Payer: COMMERCIAL

## 2024-07-19 ENCOUNTER — TELEPHONE (OUTPATIENT)
Dept: GYNECOLOGIC ONCOLOGY | Facility: HOSPITAL | Age: 64
End: 2024-07-19
Payer: COMMERCIAL

## 2024-07-19 DIAGNOSIS — M62.838 MUSCLE SPASM: ICD-10-CM

## 2024-07-19 RX ORDER — TIZANIDINE 2 MG/1
TABLET ORAL
Qty: 30 TABLET | Refills: 0 | Status: SHIPPED | OUTPATIENT
Start: 2024-07-19

## 2024-07-19 NOTE — HOSPITAL COURSE
"[X] PAT  [x] Plan for overnight obs?, no   [X] Consent  [X] Preop meds  [x] Add to list    Lindsey Herrera \"Marcela\" is a 63 y.o. female with pelvic mass presenting for removal of laparoscopic removal of pelvic mass, TLH/BSO, possible staging procedure, possible mini-lap, possible ex-lap.     Preop labs (): Hgb 14.8, Plt 331, Cr 0.68  Tumor markers: : CEA 0.9, : 15.5, CA 19-9: 10.13    PAT (): cleared    Tumor History:  TVUS 24:   IMPRESSION:  1. Round, heterogeneous, well-circumscribed masslike structure in the lower uterine segment is concerning for malignancy of the uterus or cervix although could represent a large fibroid. No prior studies are available for review. Further evaluation with MRI of the pelvis is recommended.  2. Unilocular cysts in both ovaries measuring up to 2.1 cm on the  right and 2.7 cm on the left. These could also be better evaluated on  MRI. Sonographic follow-up in 1 year is recommended.    PMH:  HTN  RLS  Low back pain  DM takes Mounjaro  Hiatal hernia     PSH:  Endometrial ablation and ovarian cystectomy   Cholecystectomy   Right hip replacement 2017  Tonsillectomy  Tubal ligation     OBHx:  The patient is a   x2. She underwent menopause more than 5 years ago denies PMB. She used COCs for less than 1 year. She did not use HRT.     Social:  They uses alcohol and tobacco socially and denies recreational drug use. The patient lives at home with her  and in laws. The patient works as a .      FamHx:  Mother Hx of cervical cancer   Their history is otherwise negative for a history of breast, ovarian, uterine, colon, pancreatic, and GI cancer.      Screening:  Cervical cancer: 2024, NILM HPV neg  Mammogram:  BIRADS 2  Colonoscopy: over 5 years ago results showed diverticulosis       Medications: ropinirole, losartan, amlodipine, mounjaro    Allergies:  Metoprolol, Ace inhibitors, Atorvastatin, Atorvastatin calcium, Gabapentin, Metformin, " Pseudoephedrine hcl, Triamterene-hydrochlorothiazid, and Erythromycin

## 2024-07-19 NOTE — TELEPHONE ENCOUNTER
FMLA forms were completed and faxed to [employer] this morning. I left a message for the patient to notify her that the forms were sent.

## 2024-07-19 NOTE — CPM/PAT NURSE NOTE
"CPM/PAT Nurse Note      Name: Lindsey Herrera (Lindsey Herrera \"Marcela\")  /Age: [birth year]//63 y.o.       Past Medical History:   Diagnosis Date    Adnexal mass     Chronic back pain     Chronic lumbar radiculopathy     GERD (gastroesophageal reflux disease)     Hiatal hernia     Hyperlipidemia, unspecified     Dyslipidemia    Hypertension     Lumbar stenosis with neurogenic claudication     Obesity     Other muscle spasm     Muscle spasm    PONV (postoperative nausea and vomiting)     Presence of right artificial hip joint     Proteinuria, unspecified     Restless legs syndrome     Tachycardia     Type 2 diabetes mellitus (Multi)     on Mounjaro, HgbA1c was 7.4 on 24    Uterine mass     Vitamin D deficiency, unspecified        Past Surgical History:   Procedure Laterality Date    CHOLECYSTECTOMY      COLONOSCOPY      ENDOMETRIAL ABLATION      HIP ARTHROPLASTY Right 2017    OVARIAN CYST REMOVAL      TONSILLECTOMY      TUBAL LIGATION      WISDOM TOOTH EXTRACTION         Patient  has no history on file for sexual activity.    Family History   Problem Relation Name Age of Onset    Diabetes Mother      COPD Mother      Thyroid disease Mother         Allergies   Allergen Reactions    Metoprolol Unknown and Other    Ace Inhibitors Unknown    Atorvastatin Unknown    Atorvastatin Calcium Dizziness and Unknown    Gabapentin Other     Irritability    Metformin Unknown and Diarrhea    Pseudoephedrine Hcl Other     Tingling in arms    Triamterene-Hydrochlorothiazid Unknown and Dizziness    Erythromycin GI Upset, Nausea/vomiting, Other and Unknown       Prior to Admission medications    Medication Sig Start Date End Date Taking? Authorizing Provider   acetaminophen (Tylenol) 500 mg tablet Take by mouth. 22   Historical Provider, MD   amLODIPine (Norvasc) 10 mg tablet Take 1 tablet (10 mg) by mouth once daily. 24   Rachell Gonzalez MD   losartan (Cozaar) 100 mg tablet TAKE 1 TABLET BY MOUTH DAILY " 4/18/24   Rachell Gonzalez MD   naproxen (Naprosyn) 250 mg tablet Take 1 tablet (250 mg) by mouth 2 times daily (morning and late afternoon).    Historical Provider, MD   omeprazole OTC (PriLOSEC OTC) 20 mg EC tablet Take 1 tablet (20 mg) by mouth once daily in the morning. Take before meals. Do not crush, chew, or split.    Historical Provider, MD   OneTouch Delica Plus Lancet 33 gauge misc For glucose testing once daily 4/1/24   MD Ned Crews Verio Reflect Meter misc  11/17/22   Historical Provider, MD   OneTouch Verio test strips strip For glucose testing once daily 4/1/24   Abrahan Haro MD   rOPINIRole (Requip) 1 mg tablet Take 1 tablet (1 mg) by mouth once daily at bedtime. 4/18/24   Rachell Gonzalez MD   tirzepatide (Mounjaro) 5 mg/0.5 mL pen injector Inject 5 mg under the skin every 7 days.  Patient taking differently: Inject 5 mg under the skin every 7 days. Takes on Sunday 4/1/24   Abrahan Haro MD   tiZANidine (Zanaflex) 2 mg tablet Take 1 tablet by mouth once daily if needed 7/19/24   Ravindra Delgado PA-C   tiZANidine (Zanaflex) 2 mg tablet Take 1 tablet by mouth once daily if needed 2/16/24 7/19/24  Jocelyn Quiñones, APRN-CNP, DNP        PAT ROS     DASI Risk Score    No data to display       Caprini DVT Assessment    No data to display       Modified Frailty Index    No data to display       CHADS2 Stroke Risk  Current as of 21 minutes ago        N/A 3 to 100%: High Risk   2 to < 3%: Medium Risk   0 to < 2%: Low Risk     Last Change: N/A          This score determines the patient's risk of having a stroke if the patient has atrial fibrillation.        This score is not applicable to this patient. Components are not calculated.          Revised Cardiac Risk Index    No data to display       Apfel Simplified Score    No data to display       Risk Analysis Index Results This Encounter    No data found in the last 1 encounters.       Scheduled for total laparoscopic  hysterectomy, bilateral salpingo-oophorectomy, any other indicated procedures with Dr. Cain on 24.  PCP: Rachell Gonzalez MD L[facility] 24    OB: Alejandro Daniels MD LOV 24  -US Pelvis Transabdominal with Transvaginal: 24  IMPRESSION:  1. Round, heterogeneous, well-circumscribed masslike structure in the  lower uterine segment is concerning for malignancy of the uterus or  cervix although could represent a large fibroid. No prior studies are  available for review. Further evaluation with MRI of the pelvis is  recommended.  2. Unilocular cysts in both ovaries measuring up to 2.1 cm on the  right and 2.7 cm on the left. These could also be better evaluated on  MRI. Sonographic follow-up in 1 year is recommended.    GynOnc: Bessy Cain MD LOV 24 seen for adnexal and cervical mass.    Endocrinology: Abrahan Haro MD [facility] 07/15/24 seen for Type 2 DM. On Mounjaro.    Pain Management: Anoop Hernandez DO LOV 24 seen for epidural steroid injection    Cardiology: Gema Rojas MD [facility] LOV 23, P: [phone], F: [phone] Plan: Pharmacologic stress test and echo for abnormal EKG and SOB. Recommended calcium score and to follow up with Dr. Granda in 4-6 weeks. Followed up on 23, patient was to follow up in 6 months for cholesterol check.  -CT Cardiac Scorin23  Total: 2913.8.     -EC24  Normal sinus rhythm  Left axis deviation  Minimal voltage criteria for LVH, may be normal variant ( Velarde product )  Abnormal ECG  When compared with ECG of 20-AUG-2010 23:10,  No significant change was found  See ED provider note for full interpretation and clinical correlation  Confirmed by Rachel Barron ([id number]) on 2024 8:07:07 PM    -Nuclear Stress Test: 23  Summary:  No chest discomfort or ischemic ECG changes with LexiScan (regadenoson) infusion.  Normal left ventricular systolic function  No definite perfusion  evidence     -ECHO: 03/23/23  Conclusions: See above for details, technically difficult study, LV wall motion not visualized in all projections, normal LV chamber size, vigorous  LV function, LVEF > 65%,  LVH with diastolic dusfunction, normal RV systolic function, trace aortic insufficiency, trace mitral regurgitation, mild tricuspid regurgitation, mild pulmonary hypertension, and normal estimated CVP.    Nurse Plan of Action:   Medication Instructions:  Instructed to hold vitamins, supplements, and NSAIDs 7 days prior to surgery.     Cardiology appointment scheduled for 07/23/24. Patient spoke with Dr. Javier office and she was able to schedule an appointment for 07/23/24,  cards appointment was canceled.    Nicky Carter RN  Pre Admission Testing

## 2024-07-22 ENCOUNTER — APPOINTMENT (OUTPATIENT)
Dept: UROLOGY | Facility: CLINIC | Age: 64
End: 2024-07-22
Payer: COMMERCIAL

## 2024-07-23 ENCOUNTER — PHARMACY VISIT (OUTPATIENT)
Dept: PHARMACY | Facility: CLINIC | Age: 64
End: 2024-07-23
Payer: COMMERCIAL

## 2024-07-23 ENCOUNTER — APPOINTMENT (OUTPATIENT)
Dept: CARDIOLOGY | Facility: CLINIC | Age: 64
End: 2024-07-23
Payer: COMMERCIAL

## 2024-07-23 ENCOUNTER — PRE-ADMISSION TESTING (OUTPATIENT)
Dept: PREADMISSION TESTING | Facility: HOSPITAL | Age: 64
End: 2024-07-23
Payer: COMMERCIAL

## 2024-07-23 VITALS
WEIGHT: 292.5 LBS | OXYGEN SATURATION: 97 % | SYSTOLIC BLOOD PRESSURE: 131 MMHG | BODY MASS INDEX: 45.91 KG/M2 | HEART RATE: 82 BPM | TEMPERATURE: 97.2 F | DIASTOLIC BLOOD PRESSURE: 88 MMHG | HEIGHT: 67 IN

## 2024-07-23 DIAGNOSIS — Z01.818 PREOPERATIVE EXAMINATION: Primary | ICD-10-CM

## 2024-07-23 DIAGNOSIS — R19.00 PELVIC MASS: ICD-10-CM

## 2024-07-23 LAB
ABO GROUP (TYPE) IN BLOOD: NORMAL
ANION GAP SERPL CALC-SCNC: 17 MMOL/L (ref 10–20)
ANTIBODY SCREEN: NORMAL
BUN SERPL-MCNC: 12 MG/DL (ref 6–23)
CALCIUM SERPL-MCNC: 10.9 MG/DL (ref 8.6–10.6)
CHLORIDE SERPL-SCNC: 102 MMOL/L (ref 98–107)
CO2 SERPL-SCNC: 23 MMOL/L (ref 21–32)
CREAT SERPL-MCNC: 0.68 MG/DL (ref 0.5–1.05)
EGFRCR SERPLBLD CKD-EPI 2021: >90 ML/MIN/1.73M*2
ERYTHROCYTE [DISTWIDTH] IN BLOOD BY AUTOMATED COUNT: 13.2 % (ref 11.5–14.5)
EST. AVERAGE GLUCOSE BLD GHB EST-MCNC: 146 MG/DL
GLUCOSE SERPL-MCNC: 140 MG/DL (ref 74–99)
HBA1C MFR BLD: 6.7 %
HCT VFR BLD AUTO: 45 % (ref 36–46)
HGB BLD-MCNC: 14.8 G/DL (ref 12–16)
MCH RBC QN AUTO: 28.8 PG (ref 26–34)
MCHC RBC AUTO-ENTMCNC: 32.9 G/DL (ref 32–36)
MCV RBC AUTO: 88 FL (ref 80–100)
NRBC BLD-RTO: 0 /100 WBCS (ref 0–0)
PLATELET # BLD AUTO: 331 X10*3/UL (ref 150–450)
POTASSIUM SERPL-SCNC: 4.2 MMOL/L (ref 3.5–5.3)
RBC # BLD AUTO: 5.13 X10*6/UL (ref 4–5.2)
RH FACTOR (ANTIGEN D): NORMAL
SODIUM SERPL-SCNC: 138 MMOL/L (ref 136–145)
WBC # BLD AUTO: 9 X10*3/UL (ref 4.4–11.3)

## 2024-07-23 PROCEDURE — 85027 COMPLETE CBC AUTOMATED: CPT

## 2024-07-23 PROCEDURE — RXMED WILLOW AMBULATORY MEDICATION CHARGE

## 2024-07-23 PROCEDURE — 36415 COLL VENOUS BLD VENIPUNCTURE: CPT

## 2024-07-23 PROCEDURE — 86901 BLOOD TYPING SEROLOGIC RH(D): CPT

## 2024-07-23 PROCEDURE — 83036 HEMOGLOBIN GLYCOSYLATED A1C: CPT

## 2024-07-23 PROCEDURE — 99204 OFFICE O/P NEW MOD 45 MIN: CPT | Performed by: NURSE PRACTITIONER

## 2024-07-23 PROCEDURE — 80048 BASIC METABOLIC PNL TOTAL CA: CPT

## 2024-07-23 RX ORDER — OLMESARTAN MEDOXOMIL AND HYDROCHLOROTHIAZIDE 40/25 40; 25 MG/1; MG/1
1 TABLET ORAL DAILY
Qty: 90 TABLET | Refills: 1 | OUTPATIENT
Start: 2024-07-23

## 2024-07-23 RX ORDER — AMLODIPINE BESYLATE 10 MG/1
10 TABLET ORAL DAILY
Qty: 90 TABLET | Refills: 1 | OUTPATIENT
Start: 2024-07-23

## 2024-07-23 ASSESSMENT — DUKE ACTIVITY SCORE INDEX (DASI)
CAN YOU DO MODERATE WORK AROUND THE HOUSE LIKE VACUUMING, SWEEPING FLOORS OR CARRYING GROCERIES: YES
CAN YOU CLIMB A FLIGHT OF STAIRS OR WALK UP A HILL: YES
CAN YOU TAKE CARE OF YOURSELF (EAT, DRESS, BATHE, OR USE TOILET): YES
CAN YOU DO HEAVY WORK AROUND THE HOUSE LIKE SCRUBBING FLOORS OR LIFTING AND MOVING HEAVY FURNITURE: YES
DASI METS SCORE: 9
CAN YOU WALK A BLOCK OR TWO ON LEVEL GROUND: YES
CAN YOU DO LIGHT WORK AROUND THE HOUSE LIKE DUSTING OR WASHING DISHES: YES
TOTAL_SCORE: 50.7
CAN YOU PARTICIPATE IN STRENOUS SPORTS LIKE SWIMMING, SINGLES TENNIS, FOOTBALL, BASKETBALL, OR SKIING: NO
CAN YOU RUN A SHORT DISTANCE: YES
CAN YOU HAVE SEXUAL RELATIONS: YES
CAN YOU DO YARD WORK LIKE RAKING LEAVES, WEEDING OR PUSHING A MOWER: YES
CAN YOU WALK INDOORS, SUCH AS AROUND YOUR HOUSE: YES
CAN YOU PARTICIPATE IN MODERATE RECREATIONAL ACTIVITIES LIKE GOLF, BOWLING, DANCING, DOUBLES TENNIS OR THROWING A BASEBALL OR FOOTBALL: YES

## 2024-07-23 ASSESSMENT — ENCOUNTER SYMPTOMS
NECK NEGATIVE: 1
RESPIRATORY NEGATIVE: 1
NEUROLOGICAL NEGATIVE: 1
CONSTITUTIONAL NEGATIVE: 1
CARDIOVASCULAR NEGATIVE: 1
GASTROINTESTINAL NEGATIVE: 1
MUSCULOSKELETAL NEGATIVE: 1
ENDOCRINE NEGATIVE: 1
EYES NEGATIVE: 1

## 2024-07-23 ASSESSMENT — LIFESTYLE VARIABLES: SMOKING_STATUS: NONSMOKER

## 2024-07-23 NOTE — CPM/PAT H&P
"CPM/PAT Evaluation       Name: Lindsey Herrera (Lindsey Herrera \"Marcela\")  /Age: 1960/63 y.o.     Visit Type:   In-Person       Chief Complaint: pelvic mass scheduled for surgery    HPI: The patient is a 63-year-old female scheduled for total laparoscopic hysterectomy with bilateral salpingo-oophorectomy and any other indicated procedures on 2024 for treatment of pelvic mass.  The patient is referred by Dr. Ghazala Cain for preoperative evaluation of GERD, hiatal hernia, hypertension, hyperlipidemia, lumbar spinal stenosis with neurogenic claudication, postop nausea and vomiting, restless leg syndrome, NIDDM, pelvic mass scheduled for surgery, nonobstructive CAD.    Past Medical History:   Diagnosis Date    Adnexal mass     Chronic back pain     Chronic lumbar radiculopathy     GERD (gastroesophageal reflux disease)     Hiatal hernia     Hyperlipidemia, unspecified     Dyslipidemia    Hypertension     Lumbar stenosis with neurogenic claudication     Obesity     Other muscle spasm     Muscle spasm    PONV (postoperative nausea and vomiting)     Presence of right artificial hip joint     Proteinuria, unspecified     Restless legs syndrome     Tachycardia     Type 2 diabetes mellitus (Multi)     on Mounjaro, HgbA1c was 7.4 on 24    Uterine mass     Vitamin D deficiency, unspecified        Past Surgical History:   Procedure Laterality Date    CHOLECYSTECTOMY      COLONOSCOPY      ENDOMETRIAL ABLATION      HIP ARTHROPLASTY Right 2017    OVARIAN CYST REMOVAL      TONSILLECTOMY      TUBAL LIGATION      WISDOM TOOTH EXTRACTION         Patient  has no history on file for sexual activity.    Family History   Problem Relation Name Age of Onset    Diabetes Mother      COPD Mother      Thyroid disease Mother         Allergies   Allergen Reactions    Metoprolol Unknown and Other    Ace Inhibitors Unknown    Atorvastatin Unknown    Atorvastatin Calcium Dizziness and Unknown    Gabapentin Other "     Irritability    Metformin Unknown and Diarrhea    Pseudoephedrine Hcl Other     Tingling in arms    Triamterene-Hydrochlorothiazid Unknown and Dizziness    Erythromycin GI Upset, Nausea/vomiting, Other and Unknown       Prior to Admission medications    Medication Sig Start Date End Date Taking? Authorizing Provider   acetaminophen (Tylenol) 500 mg tablet Take by mouth. 6/30/22  Yes Historical Provider, MD   amLODIPine (Norvasc) 10 mg tablet Take 1 tablet (10 mg) by mouth once daily. 4/18/24  Yes Rachell Gonzalez MD   losartan (Cozaar) 100 mg tablet TAKE 1 TABLET BY MOUTH DAILY 4/18/24  Yes Rachell Gonzalez MD   naproxen (Naprosyn) 250 mg tablet Take 1 tablet (250 mg) by mouth 2 times daily (morning and late afternoon).   Yes Historical Provider, MD   omeprazole OTC (PriLOSEC OTC) 20 mg EC tablet Take 1 tablet (20 mg) by mouth once daily in the morning. Take before meals. Do not crush, chew, or split.   Yes Historical Provider, MD   OneTouch Delica Plus Lancet 33 gauge misc For glucose testing once daily 4/1/24  Yes MD Trevon CrewsTouch Verio Reflect Meter misc  11/17/22  Yes Historical Provider, MD   OneTouch Verio test strips strip For glucose testing once daily 4/1/24  Yes Abrahan Haro MD   rOPINIRole (Requip) 1 mg tablet Take 1 tablet (1 mg) by mouth once daily at bedtime. 4/18/24  Yes Rachell Gonzalez MD   tirzepatide (Mounjaro) 5 mg/0.5 mL pen injector Inject 5 mg under the skin every 7 days.  Patient taking differently: Inject 5 mg under the skin every 7 days. Takes on Sunday 4/1/24  Yes Abrahan Haro MD   tiZANidine (Zanaflex) 2 mg tablet Take 1 tablet by mouth once daily if needed 7/19/24  Yes Ravindra Delgado PA-C   tiZANidine (Zanaflex) 2 mg tablet Take 1 tablet by mouth once daily if needed 2/16/24 7/19/24  Jocelyn Quiñones, APRN-CNP, DNP        PAT ROS:   Constitutional:   neg    Neuro/Psych:   neg    Eyes:   neg     use of corrective lenses  Ears:   neg    Nose:   neg     Mouth:   neg    Throat:   neg    Neck:   neg    Cardio:   neg    Respiratory:   neg    Endocrine:   neg    GI:   neg    :   neg    Musculoskeletal:   neg    Hematologic:   neg    Skin:  neg        Physical Exam  Vitals reviewed.   Constitutional:       Appearance: Normal appearance.   HENT:      Head: Normocephalic.      Mouth/Throat:      Mouth: Mucous membranes are moist.   Eyes:      Conjunctiva/sclera: Conjunctivae normal.   Neck:      Vascular: No carotid bruit.   Cardiovascular:      Rate and Rhythm: Normal rate and regular rhythm.      Pulses: Normal pulses.      Heart sounds: Normal heart sounds.   Pulmonary:      Effort: Pulmonary effort is normal.      Breath sounds: Normal breath sounds.   Abdominal:      Palpations: Abdomen is soft.      Tenderness: There is no abdominal tenderness.   Musculoskeletal:         General: Normal range of motion.      Cervical back: Normal range of motion.      Right lower leg: No edema.      Left lower leg: No edema.   Lymphadenopathy:      Cervical: No cervical adenopathy.   Skin:     General: Skin is warm and dry.      Capillary Refill: Capillary refill takes less than 2 seconds.   Neurological:      General: No focal deficit present.      Mental Status: She is alert and oriented to person, place, and time.   Psychiatric:         Mood and Affect: Mood normal.         Behavior: Behavior normal.         Thought Content: Thought content normal.         Judgment: Judgment normal.        PAT AIRWAY:   Airway:     Mallampati::  III    TM distance::  >3 FB    Neck ROM::  Full         Visit Vitals  /88   Pulse 82   Temp 36.2 °C (97.2 °F)       DASI Risk Score      Flowsheet Row Most Recent Value   DASI SCORE 50.7   METS Score (Will be calculated only when all the questions are answered) 9          Caprini DVT Assessment      Flowsheet Row Most Recent Value   DVT Score 10   Current Status Major surgery planned, lasting over 3 hours   Age 60-75 years   BMI 41-50 (Morbid  obesity)          Modified Frailty Index      Flowsheet Row Most Recent Value   Modified Frailty Index Calculator .1818          CHADS2 Stroke Risk  Current as of 46 minutes ago        N/A 3 to 100%: High Risk   2 to < 3%: Medium Risk   0 to < 2%: Low Risk     Last Change: N/A          This score determines the patient's risk of having a stroke if the patient has atrial fibrillation.        This score is not applicable to this patient. Components are not calculated.          Revised Cardiac Risk Index      Flowsheet Row Most Recent Value   Revised Cardiac Risk Calculator 1          Apfel Simplified Score      Flowsheet Row Most Recent Value   Apfel Simplified Score Calculator 4          Risk Analysis Index Results This Encounter    No data found in the last 1 encounters.       Stop Bang Score      Flowsheet Row Most Recent Value   Do you snore loudly? 0   Do you often feel tired or fatigued after your sleep? 0   Has anyone ever observed you stop breathing in your sleep? 0   Do you have or are you being treated for high blood pressure? 1   Recent BMI (Calculated) 45.7   Is BMI greater than 35 kg/m2? 1=Yes   Age older than 50 years old? 1=Yes   Is your neck circumference greater than 17 inches (Male) or 16 inches (Female)? 1   Gender - Male 0=No   STOP-BANG Total Score 4            Assessment and Plan:   Neuro: Restless leg syndrome managed on ropinirole (continue).    The patient is at an increased risk for post operative delirium secondary to type and duration of surgery.  Preoperative brain exercise educational handout provided to patient.    The patient is at an increased risk for perioperative stroke secondary to HTN, HLD, DM , female sex , general anesthesia, and op time >2.5 hours.     HEENT/Airway:  No diagnosis or significant findings on chart review or clinical presentation and evaluation.     Cardiovascular: Hypertension managed on losartan (last dose morning before surgery) and amlodipine (continue).   "Hyperlipidemia with intolerance to statins being managed with lifestyle modifications and diet.  EKG 05/24/2024 normal sinus rhythm.  CT cardiac scoring 03/21/2023 total score 2913.8. Patient follows with Dr. Chirag Rojas at [facility] (notes, testing and risk stratification form all scanned into media tab) and [facility].  Patient seen on 07/23/2024 for preoperative risk assessment.  Echocardiogram 03/23/2023 LVEF greater than 65%, LVH with diastolic dysfunction, normal RV systolic function, trace aortic insufficiency, trace mitral valve regurgitation, mild tricuspid valve regurgitation, mild pulmonary hypertension with RVSP 39 mmHg and normal estimated CVP.  Stress test on 03/30/2023 negative for stress-induced ischemia.  Office visit from 05/21/2023 states patient is stable and advised to be managed on 81 mg aspirin (which she is not taking) and adherence to diet modifications.  Patient was prescribed Zetia however is not currently taking.  Was requested to follow-up for 6-month however was lost to follow-up therefore scheduled for appointment on 07/23/2024.  No additional preoperative testing is currently indicated.    Appointment 07/23/24 : full note and testing scanned into media tab.  Excerpt from note \"\"Genet can proceed with hysterectomy with minimal to moderate risk due to hypertension.  She should take her olmesartan/HCTZ and amlodipine daily up to surgery and resume afterwards as able.  She will return to see us in 6 weeks for follow-up.\"    METS are 9    RCRI  1 which is 6% 30 day risk of MACE (risk for cardiac death, nonfatal myocardial infarction, and nonfactal cardiac arrest    UBALDO score which indicates a 0.1% risk of intraoperative or 30-day postoperative MACE      Pulmonary: No diagnosis or significant findings on chart review or clinical presentation and evaluation however see below risk screening tools.  Preoperative deep breathing educational handout provided to " patient.    ARISCAT:   26   points which is a intermediate (13.3%) risk of in-hospital post-op pulmonary complications     PRODIGY:  8  points which is a intermediate risk of post op opioid induced respiratory depression episodes    STOP BAN   points which is a intermediate risk for moderate to severe TAYLOR.  Patient to discuss risk factors with PCP postop.    Renal: No diagnosis or significant findings on chart review or clinical presentation and evaluation, however, the patient is at increased risk of perioperative renal complications secondary to age>/= 56, HTN, intraperitoneal surgery, and diabetes. Preventative measures include preoperative BP and DM control.     Endocrine: NIDDM managed on tirzepatide (Mounjaro-dosed weekly on Sundays with last dose 2024).  A1c collected in office today.    Hematologic:   No diagnosis or significant findings on chart review or clinical presentation and evaluation however see below risk screening tool.  Preoperative DVT educational handout provided to patient.    Caprini Score:  10  points which is a highest risk of perioperative VTE    Gastrointestinal:   GERD and hiatal hernia managed with diet modifications and omeprazole (continue).  Patient with known history of postop nausea and vomiting plan for antiemetic therapy.    EAT-10 score of  0 - self-perceived oropharyngeal dysphagia scale (0-40)     Apfel: 4 points 79% risk for post operative N/V    Infectious disease:  No diagnosis or significant findings on chart review or clinical presentation and evaluation.      Musculoskeletal:  OA and lumbar spinal stenosis with neurogenic claudication managed with as needed pain relievers and muscle relaxer.  Tizanidine (continue), acetaminophen (continue) and naproxen (hold 7 days).    Other:   Pelvic mass scheduled for surgery.        Labs ordered  Results for orders placed or performed in visit on 24 (from the past 96 hour(s))   CBC   Result Value Ref Range    WBC 9.0  4.4 - 11.3 x10*3/uL    nRBC 0.0 0.0 - 0.0 /100 WBCs    RBC 5.13 4.00 - 5.20 x10*6/uL    Hemoglobin 14.8 12.0 - 16.0 g/dL    Hematocrit 45.0 36.0 - 46.0 %    MCV 88 80 - 100 fL    MCH 28.8 26.0 - 34.0 pg    MCHC 32.9 32.0 - 36.0 g/dL    RDW 13.2 11.5 - 14.5 %    Platelets 331 150 - 450 x10*3/uL   Basic Metabolic Panel   Result Value Ref Range    Glucose 140 (H) 74 - 99 mg/dL    Sodium 138 136 - 145 mmol/L    Potassium 4.2 3.5 - 5.3 mmol/L    Chloride 102 98 - 107 mmol/L    Bicarbonate 23 21 - 32 mmol/L    Anion Gap 17 10 - 20 mmol/L    Urea Nitrogen 12 6 - 23 mg/dL    Creatinine 0.68 0.50 - 1.05 mg/dL    eGFR >90 >60 mL/min/1.73m*2    Calcium 10.9 (H) 8.6 - 10.6 mg/dL   Type And Screen   Result Value Ref Range    ABO TYPE O     Rh TYPE NEG     ANTIBODY SCREEN NEG    Hemoglobin A1C   Result Value Ref Range    Hemoglobin A1C 6.7 (H) see below %    Estimated Average Glucose 146 Not Established mg/dL

## 2024-07-23 NOTE — PREPROCEDURE INSTRUCTIONS
Thank you for visiting [facility] ([facility]) today for your pre-procedure evaluation, you were seen by     Samantha Meeson, MSN, NP-C  Adult-Gerontology Nurse Practitioner II  Department of Anesthesiology and Perioperative Medicine  Main phone [phone]  Direct phone [phone]  Fax [phone]     This summary includes instructions and information to aid you during your perioperative period.  Please read carefully. If you have any questions about your visit today, please call the number listed above.  If you become ill or have any changes to your health before your surgery, please contact your primary care provider and alert your surgeon.    Preparing for your Surgery       Exercises  Preoperative Deep Breathing Exercises  Why it is important to do deep breathing exercises before my surgery?  Deep breathing exercises strengthen your breathing muscles.  This helps you to recover after your surgery and decreases the chance of breathing complications.  How are the deep breathing exercises done?  Sit straight with your back supported.  Breathe in deeply and slowly through your nose. Your lower rib cage should expand and your abdomen may move forward.  Hold that breath for 3 to 5 seconds.  Breathe out through pursed lips, slowly and completely.  Rest and repeat 10 times every hour while awake.  Rest longer if you become dizzy or lightheaded.       Incentive Spirometer   You were provided with an incentive spirometer in CPM/PAT, please follow the below instructions.   You were not provided an incentive spirometer in CPM, please disregard the incentive spirometer instructions  What is an incentive spirometer?  An incentive spirometer is a device used before and after surgery to “exercise” your lungs.  It helps you to take deeper breaths to expand your lungs.  Below is an example of a basic incentive spirometer.  The device you receive may differ slightly but they all function the  same.    Why do I need to use an incentive spirometer?  Using your incentive spirometer prepares your lungs for surgery and helps prevent lung problems after surgery.  How do I use my incentive spirometer?  When you're using your incentive spirometer, make sure to breathe through your mouth. If you breathe through your nose, the incentive spirometer won't work properly. You can hold your nose if you have trouble.  If you feel dizzy at any time, stop and rest. Try again at a later time.  Follow the steps below:  Set up your incentive spirometer, expand the flexible tubing and connect to the outlet.  Sit upright in a chair or bed. Hold the incentive spirometer at eye level.   Put the mouthpiece in your mouth and close your lips tightly around it. Slowly breathe out (exhale) completely.  Breathe in (inhale) slowly through your mouth as deeply as you can. As you take a breath, you will see the piston rise inside the large column. While the piston rises, the indicator should move upwards. It should stay in between the 2 arrows (see Figure).  Try to get the piston as high as you can, while keeping the indicator between the arrows.   If the indicator doesn't stay between the arrows, you're breathing either too fast or too slow.  When you get it as high as you can, hold your breath for 10 seconds, or as long as possible. While you're holding your breath, the piston will slowly fall to the base of the spirometer.  Once the piston reaches the bottom of the spirometer, breathe out slowly through your mouth. Rest for a few seconds.  Repeat 10 times. Try to get the piston to the same level with each breath.  Repeat every hour while awake  You can carefully clean the outside of the mouthpiece with an alcohol wipe or soap and water.      Preoperative Brain Exercises    What are brain exercises?  A brain exercise is any activity that engages your thinking (cognitive) skills.    What types of activities are considered brain  exercises?  Jigsaw puzzles, crossword puzzles, word jumble, memory games, word search, and many more.  Many can be found free online or on your phone via a mobile carlitos.    Why should I do brain exercises before my surgery?  More recent research has shown brain exercise before surgery can lower the risk of postoperative delirium (confusion) which can be especially important for older adults.  Patients who did brain exercises for 5 to 10 hours the days before surgery, cut their risk of postoperative delirium in half up to 1 week after surgery.    Sit-to-Stand Exercise    What is the sit-to-stand exercise?  The sit-to-stand exercise strengthens the muscles of your lower body and muscles in the center of your body (core muscles for stability) helping to maintain and improve your strength and mobility.  How do I do the sit-to-stand exercise?  The goal is to do this exercise without using your arms or hands.  If this is too difficult, use your arms and hands or a chair with armrests to help slowly push yourself to the standing position and lower yourself back to the sitting position. As the movement becomes easier use your arms and hands less.    Steps to the sit-to-stand exercise  Sit up tall in a sturdy chair, knees bent, feet flat on the floor shoulder-width apart.  Shift your hips/pelvis forward in the chair to correctly position yourself for the next movement.  Lean forward at your hips.  Stand up straight putting equal weight on both feet.  Check to be sure you are properly aligned with the chair, in a slow controlled movement sit back down.  Repeat this exercise 10-15 times.  If needed you can do it fewer times until your strength improves.  Rest for 1 minute.  Do another 10-15 sit-to-stand exercises.  Try to do this in the morning and evening.        Instructions    Preoperative Fasting Guidelines    Why must I stop eating and drinking near surgery time?  With sedation, food or liquid in your stomach can enter your  lungs causing serious complications  Food can increase nausea and vomiting  When do I need to stop eating and drinking before my surgery?      Do not eat any food after midnight the night before your surgery/procedure. You may have up to 13.5 ounces of clear liquid until TWO hours before your instructed arrival time to the hospital.  This includes water, black tea/coffee, (no milk or cream) apple juice, and electrolyte drinks (Gatorade). You may chew gum until TWO hours before your surgery/procedure            Simple things you can do to help prevent blood clots     Blood clots are blockages that can form in the body's veins. When a blood clot forms in your deep veins, it may be called a deep vein thrombosis, or DVT for short. Blood clots can happen in any part of the body where blood flows, but they are most common in the arms and legs. If a piece of a blood clot breaks free and travels to the lungs, it is called a pulmonary embolus (PE). A PE can be a very serious problem.         Being in the hospital or having surgery can raise your chances of getting a blood clot because you may not be well enough to move around as much as you normally do.         Ways you can help prevent blood clots in the hospital       Wearing SCDs  SCDs stands for Sequential Compression Devices.   SCDs are special sleeves that wrap around your legs. They attach to a pump that fills them with air to gently squeeze your legs every few minutes.  This helps return the blood in your legs to your heart.   SCDs should only be taken off when walking or bathing. SCDs may not be comfortable, but they can help save your life.              Pump SCD leg sleeves  Wearing compression stockings - if your doctor orders them. These special snug-fitting stockings gently squeeze your legs to help blood flow.       Walking. Walking helps move the blood in your legs.   If your doctor says it is ok, try walking the halls at least   5 times a day. Ask us to help  you get up, so you don't fall.      Taking any blood-thinning medicines your doctor orders.              Ways you can help prevent blood clots at home         Wearing compression stockings - if your doctor orders them.   Walking - to help move the blood in your legs.    Taking any blood-thinning medicines your doctor orders.      Signs of a blood clot or PE    Tell your doctor or nurse right away if you have any of the problems listed below.         If you are at home, seek medical care right away. Call 911 for chest pain or problems breathing.            Signs of a blood clot (DVT) - such as pain, swelling, redness, or warmth in your arm or legs.  Signs of a pulmonary embolism (PE) - such as chest pain or feeling short of breath      Tobacco and Alcohol;  Do not drink alcohol or smoke within 24 hours of surgery.  It is best to quit smoking for as long as possible before any surgery or procedure.      The Week before Surgery        Seven days before Surgery  Check your CPM medication instructions  Do the exercises provided to you by CPM   Arrange for a responsible, adult licensed  to take you home after surgery and stay with you for 24 hours.  You will not be permitted to drive yourself home if you have received any anesthetic/sedation  Six days before surgery  Check your CPM medication instructions  Do the exercises provided to you by CPM   Start using Chlorhexidene (CHG) body wash if prescribed  Five days before surgery  Check your CPM medication instructions  Do the exercises provided to you by CPM   Continue to use CHG body wash if prescribed  Three days before surgery  Check your CPM medication instructions  Do the exercises provided to you by CPM   Continue to use CHG body wash if prescribed  Two days before surgery  Check your CPM medication instructions  Do the exercises provided to you by CPM   Continue to use CHG body wash if prescribed    The Day before Surgery       Check your CPM medication and  all other CPM instructions including when to stop eating and drinking  You will be called with your arrival time for surgery in the late afternoon.  If you do not receive a call please reach out to your surgeon's office.  Do not smoke or drink 24 hours before surgery  Prepare items to bring with you to the hospital  Shower with your chlorhexidine wash if prescribed  Brush your teeth and use your chlorhexidine dental rinse if prescribed    The Day of Surgery       Check your CPM medication instructions  Ensure you follow the instructions for when to stop eating and drinking  Shower, if prescribed use CHG.  Do not apply any lotions, creams, moisturizers, perfume or deodorant  Brush your teeth and use your CHG dental rinse if prescribed  Wear loose comfortable clothing  Avoid make-up  Remove  jewelry and piercings, consider professional piercing removal with a plastic spacer if needed  Bring photo ID and Insurance card  Bring an accurate medication list that includes medication dose, frequency and allergies  Bring a copy of your advanced directives (will, health care power of )  Bring any devices and controllers as well as medical devices you have been provided with for surgery (CPAP, slings, braces, etc.)  Dentures, eyeglasses, and contacts will be removed before surgery, please bring cases for contacts or glasses

## 2024-07-24 ENCOUNTER — APPOINTMENT (OUTPATIENT)
Dept: PRIMARY CARE | Facility: CLINIC | Age: 64
End: 2024-07-24
Payer: COMMERCIAL

## 2024-07-28 ENCOUNTER — ANESTHESIA EVENT (OUTPATIENT)
Dept: OPERATING ROOM | Facility: HOSPITAL | Age: 64
End: 2024-07-28
Payer: COMMERCIAL

## 2024-07-28 NOTE — H&P
History Of Present Illness  Marcela Herrera is a 63 y.o. female presenting with pelvic mass presenting for removal of laparoscopic removal of pelvic mass, TLH/BSO, possible staging procedure, possible mini-lap, possible ex-lap     Preop labs (): Hgb 14.8, Plt 331, Cr 0.68  Tumor markers: : CEA 0.9, : 15.5, CA 19-9: 10.13    PAT (): cleared    Tumor History:  TVUS 24:   IMPRESSION:  1. Round, heterogeneous, well-circumscribed masslike structure in the lower uterine segment is concerning for malignancy of the uterus or cervix although could represent a large fibroid. No prior studies are available for review. Further evaluation with MRI of the pelvis is recommended.  2. Unilocular cysts in both ovaries measuring up to 2.1 cm on the  right and 2.7 cm on the left. These could also be better evaluated on  MRI. Sonographic follow-up in 1 year is recommended.    PMH:  HTN  RLS  Low back pain  DM takes Mounjaro  Hiatal hernia     PSH:  Endometrial ablation and ovarian cystectomy   Cholecystectomy 2012  Right hip replacement 2017  Tonsillectomy  Tubal ligation     OBHx:  The patient is a   x2. She underwent menopause more than 5 years ago denies PMB. She used COCs for less than 1 year. She did not use HRT.     Social:  They uses alcohol and tobacco socially and denies recreational drug use. The patient lives at home with her  and in laws. The patient works as a .      FamHx:  Mother Hx of cervical cancer   Their history is otherwise negative for a history of breast, ovarian, uterine, colon, pancreatic, and GI cancer.      Screening:  Cervical cancer: 2024, NILM HPV neg  Mammogram:  BIRADS 2  Colonoscopy: over 5 years ago results showed diverticulosis       Medications: ropinirole, losartan, amlodipine, mounjaro    Allergies:  Metoprolol, Ace inhibitors, Atorvastatin, Atorvastatin calcium, Gabapentin, Metformin, Pseudoephedrine hcl, Triamterene-hydrochlorothiazid, and  Erythromycin    Review of Systems   All other systems reviewed and are negative.       Physical Exam  Constitutional:       Appearance: Normal appearance.   HENT:      Head: Normocephalic and atraumatic.      Nose: Nose normal.      Mouth/Throat:      Mouth: Mucous membranes are moist.      Pharynx: No oropharyngeal exudate or posterior oropharyngeal erythema.   Eyes:      Extraocular Movements: Extraocular movements intact.      Conjunctiva/sclera: Conjunctivae normal.   Pulmonary:      Effort: Pulmonary effort is normal.   Musculoskeletal:      Cervical back: Normal range of motion.   Skin:     Findings: No bruising, erythema, lesion or rash.   Neurological:      General: No focal deficit present.      Mental Status: She is alert.   Psychiatric:         Mood and Affect: Mood normal.         Behavior: Behavior normal.         Thought Content: Thought content normal.         Judgment: Judgment normal.          Last Recorded Vitals  There were no vitals taken for this visit.    Relevant Results  Medications  No current facility-administered medications for this encounter.    Current Outpatient Medications:     acetaminophen (Tylenol) 500 mg tablet, Take by mouth., Disp: , Rfl:     amLODIPine (Norvasc) 10 mg tablet, Take 1 Tablet by mouth Daily, Disp: 90 tablet, Rfl: 1    losartan (Cozaar) 100 mg tablet, TAKE 1 TABLET BY MOUTH DAILY, Disp: 90 tablet, Rfl: 0    naproxen (Naprosyn) 250 mg tablet, Take 1 tablet (250 mg) by mouth 2 times daily (morning and late afternoon)., Disp: , Rfl:     olmesartan-hydrochlorothiazide (BENIcar HCT) 40-25 mg tablet, Take 1 Tablet by mouth Daily, Disp: 90 tablet, Rfl: 1    omeprazole OTC (PriLOSEC OTC) 20 mg EC tablet, Take 1 tablet (20 mg) by mouth once daily in the morning. Take before meals. Do not crush, chew, or split., Disp: , Rfl:     [facility]et [facility], For glucose testing once daily, Disp: 100 each, Rfl: 3    OneTouch Verio Reflect Meter misc, , Disp: ,  "Rfl:     OneTouch Verio test strips strip, For glucose testing once daily, Disp: 100 strip, Rfl: 3    rOPINIRole (Requip) 1 mg tablet, Take 1 tablet (1 mg) by mouth once daily at bedtime., Disp: 90 tablet, Rfl: 0    tirzepatide (Mounjaro) 5 mg/0.5 mL pen injector, Inject 5 mg under the skin every 7 days. (Patient taking differently: Inject 5 mg under the skin every 7 days. Takes on Sunday), Disp: 2 mL, Rfl: 10    tiZANidine (Zanaflex) 2 mg tablet, Take 1 tablet by mouth once daily if needed, Disp: 30 tablet, Rfl: 0    Labs  CBC  No results found for: \"WBC\", \"NRBC\", \"RBC\", \"HGB\", \"HCT\", \"MCV\", \"MCH\", \"MCHC\", \"RDW\", \"PLT\", \"MPV\"    CMP  No results found for: \"GLUCOSE\", \"NA\", \"K\", \"CL\", \"CO2\", \"ANIONGAP\", \"BUN\", \"CREATININE\", \"EGFR\", \"CALCIUM\", \"ALBUMIN\", \"ALKPHOS\", \"PROT\", \"AST\", \"BILITOT\", \"ALT\"    Coagulation   No results found for: \"PROTIME\", \"INR\", \"APTT\", \"FIBRINOGEN\"    Pregnancy Tests  No results found for: \"HCGQUANT\", \"HCGURINE\"    Imaging   Results for orders placed during the hospital encounter of 06/05/24    US PELVIS TRANSABDOMINAL WITH TRANSVAGINAL    Narrative  Interpreted By:  Jasiel Carpenter,  STUDY:  US PELVIS TRANSABDOMINAL WITH TRANSVAGINAL;  6/5/2024 1:50 pm    INDICATION:  Signs/Symptoms:Pain in female pelvis, abnormal CT scan showing mass  in the lower uterus/cervix.    COMPARISON:  CT abdomen pelvis 05/24/2024.    ACCESSION NUMBER(S):  [id number]    ORDERING CLINICIAN:  MARGIE HARDING    TECHNIQUE:  Multiple multiplanar static gray scale, color and spectral waveform  sonographic images of the pelvis were obtained. Transabdominal and  endovaginal ultrasound was performed.    FINDINGS:  The study is limited by artifact due to patient body habitus.    UTERUS:  The uterus is anteverted and measures 3.6 x 5.4 x 12.0 cm  (AP/TV/length). There is a round, well-circumscribed, mass-like  structure in the lower uterine segment demonstrating echogenicity  heterogeneous echogenicity measuring up to 7.5 x 7.0 " x 6.8 cm (image  8). It demonstrates irregular posterior acoustic shadowing.    ENDOMETRIUM:  The endometrium measures a thickness of 0.6 cm, which is normal.    RIGHT ADNEXA:  The right ovary measures 2.6 cm x 2.9 cm x 3.0 and demonstrates  normal flow. A unilocular hypoechoic to anechoic cyst in the right  ovary measures up to 2.1 cm (image 52).    LEFT ADNEXA:  The left ovary measures 2.6 x 1.7 x 3.4 and demonstrates normal flow.  No gross left adnexal masses are seen, no hydrosalpinx. An anechoic  unilocular cyst in the left ovary measures up to 2.7 cm (image 60).    CUL DE SAC:  No gross free fluid is seen in the pelvic cul-de-sac.    Impression  1. Round, heterogeneous, well-circumscribed masslike structure in the  lower uterine segment is concerning for malignancy of the uterus or  cervix although could represent a large fibroid. No prior studies are  available for review. Further evaluation with MRI of the pelvis is  recommended.  2. Unilocular cysts in both ovaries measuring up to 2.1 cm on the  right and 2.7 cm on the left. These could also be better evaluated on  MRI. Sonographic follow-up in 1 year is recommended.    MACRO:  None    Signed by: Jasiel Carpenter 6/6/2024 6:04 PM  Dictation workstation:   [id number]         Assessment/Plan   Principal Problem:    Pelvic mass  Marcela Herrera is a 63 y.o. female presenting with pelvic mass presenting for removal of laparoscopic removal of pelvic mass, TLH/BSO, possible staging procedure, possible mini-lap, possible ex-lap     -Pt to OR   - consent form signed            Neli Malcolm MD

## 2024-07-29 ENCOUNTER — HOSPITAL ENCOUNTER (OUTPATIENT)
Facility: HOSPITAL | Age: 64
Setting detail: OUTPATIENT SURGERY
Discharge: HOME | End: 2024-07-29
Attending: STUDENT IN AN ORGANIZED HEALTH CARE EDUCATION/TRAINING PROGRAM | Admitting: STUDENT IN AN ORGANIZED HEALTH CARE EDUCATION/TRAINING PROGRAM
Payer: COMMERCIAL

## 2024-07-29 ENCOUNTER — PHARMACY VISIT (OUTPATIENT)
Dept: PHARMACY | Facility: CLINIC | Age: 64
End: 2024-07-29
Payer: COMMERCIAL

## 2024-07-29 ENCOUNTER — ANESTHESIA (OUTPATIENT)
Dept: OPERATING ROOM | Facility: HOSPITAL | Age: 64
End: 2024-07-29
Payer: COMMERCIAL

## 2024-07-29 VITALS
TEMPERATURE: 97.7 F | DIASTOLIC BLOOD PRESSURE: 68 MMHG | BODY MASS INDEX: 45.4 KG/M2 | HEART RATE: 77 BPM | RESPIRATION RATE: 12 BRPM | HEIGHT: 67 IN | OXYGEN SATURATION: 98 % | SYSTOLIC BLOOD PRESSURE: 119 MMHG | WEIGHT: 289.24 LBS

## 2024-07-29 DIAGNOSIS — G89.18 POST-OPERATIVE PAIN: Primary | ICD-10-CM

## 2024-07-29 DIAGNOSIS — R19.00 PELVIC MASS: ICD-10-CM

## 2024-07-29 DIAGNOSIS — E11.9 TYPE 2 DIABETES MELLITUS WITHOUT COMPLICATION, WITHOUT LONG-TERM CURRENT USE OF INSULIN (MULTI): ICD-10-CM

## 2024-07-29 PROBLEM — R11.2 PONV (POSTOPERATIVE NAUSEA AND VOMITING): Status: ACTIVE | Noted: 2024-07-29

## 2024-07-29 PROBLEM — Z98.890 PONV (POSTOPERATIVE NAUSEA AND VOMITING): Status: ACTIVE | Noted: 2024-07-29

## 2024-07-29 LAB
ABO GROUP (TYPE) IN BLOOD: NORMAL
GLUCOSE BLD MANUAL STRIP-MCNC: 152 MG/DL (ref 74–99)
GLUCOSE BLD MANUAL STRIP-MCNC: 194 MG/DL (ref 74–99)
POC FINGERSTICK BLOOD GLUCOSE: 152 MG/DL (ref 70–100)
RH FACTOR (ANTIGEN D): NORMAL

## 2024-07-29 PROCEDURE — 58573 TLH W/T/O UTERUS OVER 250 G: CPT | Performed by: STUDENT IN AN ORGANIZED HEALTH CARE EDUCATION/TRAINING PROGRAM

## 2024-07-29 PROCEDURE — 2500000004 HC RX 250 GENERAL PHARMACY W/ HCPCS (ALT 636 FOR OP/ED)

## 2024-07-29 PROCEDURE — 99223 1ST HOSP IP/OBS HIGH 75: CPT | Performed by: STUDENT IN AN ORGANIZED HEALTH CARE EDUCATION/TRAINING PROGRAM

## 2024-07-29 PROCEDURE — 82947 ASSAY GLUCOSE BLOOD QUANT: CPT

## 2024-07-29 PROCEDURE — 88307 TISSUE EXAM BY PATHOLOGIST: CPT | Performed by: PATHOLOGY

## 2024-07-29 PROCEDURE — 7100000002 HC RECOVERY ROOM TIME - EACH INCREMENTAL 1 MINUTE: Performed by: STUDENT IN AN ORGANIZED HEALTH CARE EDUCATION/TRAINING PROGRAM

## 2024-07-29 PROCEDURE — 3700000002 HC GENERAL ANESTHESIA TIME - EACH INCREMENTAL 1 MINUTE: Performed by: STUDENT IN AN ORGANIZED HEALTH CARE EDUCATION/TRAINING PROGRAM

## 2024-07-29 PROCEDURE — 7100000010 HC PHASE TWO TIME - EACH INCREMENTAL 1 MINUTE: Performed by: STUDENT IN AN ORGANIZED HEALTH CARE EDUCATION/TRAINING PROGRAM

## 2024-07-29 PROCEDURE — 3600000004 HC OR TIME - INITIAL BASE CHARGE - PROCEDURE LEVEL FOUR: Performed by: STUDENT IN AN ORGANIZED HEALTH CARE EDUCATION/TRAINING PROGRAM

## 2024-07-29 PROCEDURE — RXMED WILLOW AMBULATORY MEDICATION CHARGE

## 2024-07-29 PROCEDURE — 3700000001 HC GENERAL ANESTHESIA TIME - INITIAL BASE CHARGE: Performed by: STUDENT IN AN ORGANIZED HEALTH CARE EDUCATION/TRAINING PROGRAM

## 2024-07-29 PROCEDURE — 2500000001 HC RX 250 WO HCPCS SELF ADMINISTERED DRUGS (ALT 637 FOR MEDICARE OP): Performed by: STUDENT IN AN ORGANIZED HEALTH CARE EDUCATION/TRAINING PROGRAM

## 2024-07-29 PROCEDURE — 7100000009 HC PHASE TWO TIME - INITIAL BASE CHARGE: Performed by: STUDENT IN AN ORGANIZED HEALTH CARE EDUCATION/TRAINING PROGRAM

## 2024-07-29 PROCEDURE — 2500000005 HC RX 250 GENERAL PHARMACY W/O HCPCS: Performed by: STUDENT IN AN ORGANIZED HEALTH CARE EDUCATION/TRAINING PROGRAM

## 2024-07-29 PROCEDURE — 2500000004 HC RX 250 GENERAL PHARMACY W/ HCPCS (ALT 636 FOR OP/ED): Performed by: STUDENT IN AN ORGANIZED HEALTH CARE EDUCATION/TRAINING PROGRAM

## 2024-07-29 PROCEDURE — 36415 COLL VENOUS BLD VENIPUNCTURE: CPT | Performed by: STUDENT IN AN ORGANIZED HEALTH CARE EDUCATION/TRAINING PROGRAM

## 2024-07-29 PROCEDURE — 3600000009 HC OR TIME - EACH INCREMENTAL 1 MINUTE - PROCEDURE LEVEL FOUR: Performed by: STUDENT IN AN ORGANIZED HEALTH CARE EDUCATION/TRAINING PROGRAM

## 2024-07-29 PROCEDURE — 96372 THER/PROPH/DIAG INJ SC/IM: CPT | Performed by: STUDENT IN AN ORGANIZED HEALTH CARE EDUCATION/TRAINING PROGRAM

## 2024-07-29 PROCEDURE — 82962 GLUCOSE BLOOD TEST: CPT | Performed by: STUDENT IN AN ORGANIZED HEALTH CARE EDUCATION/TRAINING PROGRAM

## 2024-07-29 PROCEDURE — 2720000007 HC OR 272 NO HCPCS: Performed by: STUDENT IN AN ORGANIZED HEALTH CARE EDUCATION/TRAINING PROGRAM

## 2024-07-29 PROCEDURE — 7100000001 HC RECOVERY ROOM TIME - INITIAL BASE CHARGE: Performed by: STUDENT IN AN ORGANIZED HEALTH CARE EDUCATION/TRAINING PROGRAM

## 2024-07-29 PROCEDURE — 2500000005 HC RX 250 GENERAL PHARMACY W/O HCPCS

## 2024-07-29 PROCEDURE — 88307 TISSUE EXAM BY PATHOLOGIST: CPT | Mod: TC,SUR | Performed by: STUDENT IN AN ORGANIZED HEALTH CARE EDUCATION/TRAINING PROGRAM

## 2024-07-29 PROCEDURE — A58571 PR LAPAROSCOPY W TOT HYSTERECTUTERUS <=250 GRAM  W TUBE/OVARY: Performed by: ANESTHESIOLOGY

## 2024-07-29 RX ORDER — TRAMADOL HYDROCHLORIDE 50 MG/1
50 TABLET ORAL EVERY 6 HOURS PRN
Qty: 12 TABLET | Refills: 0 | Status: SHIPPED | OUTPATIENT
Start: 2024-07-29 | End: 2024-08-01

## 2024-07-29 RX ORDER — ACETAMINOPHEN 500 MG
1000 TABLET ORAL EVERY 6 HOURS
Qty: 56 TABLET | Refills: 0 | Status: SHIPPED | OUTPATIENT
Start: 2024-07-29 | End: 2024-08-05

## 2024-07-29 RX ORDER — LIDOCAINE HYDROCHLORIDE 20 MG/ML
INJECTION, SOLUTION INFILTRATION; PERINEURAL AS NEEDED
Status: DISCONTINUED | OUTPATIENT
Start: 2024-07-29 | End: 2024-07-29

## 2024-07-29 RX ORDER — CELECOXIB 200 MG/1
400 CAPSULE ORAL ONCE
Status: COMPLETED | OUTPATIENT
Start: 2024-07-29 | End: 2024-07-29

## 2024-07-29 RX ORDER — AMOXICILLIN 250 MG
2 CAPSULE ORAL 2 TIMES DAILY
Qty: 28 TABLET | Refills: 0 | Status: SHIPPED | OUTPATIENT
Start: 2024-07-29 | End: 2024-08-05

## 2024-07-29 RX ORDER — HYDROMORPHONE HYDROCHLORIDE 1 MG/ML
0.5 INJECTION, SOLUTION INTRAMUSCULAR; INTRAVENOUS; SUBCUTANEOUS EVERY 5 MIN PRN
Status: DISCONTINUED | OUTPATIENT
Start: 2024-07-29 | End: 2024-07-29 | Stop reason: HOSPADM

## 2024-07-29 RX ORDER — NALOXONE HYDROCHLORIDE 4 MG/.1ML
4 SPRAY NASAL AS NEEDED
Qty: 2 EACH | Refills: 11 | Status: SHIPPED | OUTPATIENT
Start: 2024-07-29

## 2024-07-29 RX ORDER — IBUPROFEN 600 MG/1
600 TABLET ORAL EVERY 6 HOURS
Qty: 28 TABLET | Refills: 0 | Status: SHIPPED | OUTPATIENT
Start: 2024-07-29 | End: 2024-08-05

## 2024-07-29 RX ORDER — ACETAMINOPHEN 325 MG/1
975 TABLET ORAL ONCE
Status: COMPLETED | OUTPATIENT
Start: 2024-07-29 | End: 2024-07-29

## 2024-07-29 RX ORDER — GABAPENTIN 600 MG/1
600 TABLET ORAL ONCE
Status: DISCONTINUED | OUTPATIENT
Start: 2024-07-29 | End: 2024-07-29 | Stop reason: HOSPADM

## 2024-07-29 RX ORDER — HEPARIN SODIUM 5000 [USP'U]/ML
5000 INJECTION, SOLUTION INTRAVENOUS; SUBCUTANEOUS ONCE
Status: COMPLETED | OUTPATIENT
Start: 2024-07-29 | End: 2024-07-29

## 2024-07-29 RX ORDER — SODIUM CHLORIDE 0.9 G/100ML
IRRIGANT IRRIGATION AS NEEDED
Status: DISCONTINUED | OUTPATIENT
Start: 2024-07-29 | End: 2024-07-29 | Stop reason: HOSPADM

## 2024-07-29 RX ORDER — ROCURONIUM BROMIDE 10 MG/ML
INJECTION, SOLUTION INTRAVENOUS AS NEEDED
Status: DISCONTINUED | OUTPATIENT
Start: 2024-07-29 | End: 2024-07-29

## 2024-07-29 RX ORDER — ONDANSETRON HYDROCHLORIDE 2 MG/ML
4 INJECTION, SOLUTION INTRAVENOUS ONCE AS NEEDED
Status: DISCONTINUED | OUTPATIENT
Start: 2024-07-29 | End: 2024-07-29 | Stop reason: HOSPADM

## 2024-07-29 RX ORDER — FENTANYL CITRATE 50 UG/ML
INJECTION, SOLUTION INTRAMUSCULAR; INTRAVENOUS AS NEEDED
Status: DISCONTINUED | OUTPATIENT
Start: 2024-07-29 | End: 2024-07-29

## 2024-07-29 RX ORDER — SODIUM CHLORIDE, SODIUM LACTATE, POTASSIUM CHLORIDE, CALCIUM CHLORIDE 600; 310; 30; 20 MG/100ML; MG/100ML; MG/100ML; MG/100ML
100 INJECTION, SOLUTION INTRAVENOUS CONTINUOUS
Status: DISCONTINUED | OUTPATIENT
Start: 2024-07-29 | End: 2024-07-29 | Stop reason: HOSPADM

## 2024-07-29 RX ORDER — LIDOCAINE HYDROCHLORIDE 10 MG/ML
0.1 INJECTION, SOLUTION EPIDURAL; INFILTRATION; INTRACAUDAL; PERINEURAL ONCE
Status: DISCONTINUED | OUTPATIENT
Start: 2024-07-29 | End: 2024-07-29 | Stop reason: HOSPADM

## 2024-07-29 RX ORDER — ESMOLOL HYDROCHLORIDE 10 MG/ML
INJECTION INTRAVENOUS AS NEEDED
Status: DISCONTINUED | OUTPATIENT
Start: 2024-07-29 | End: 2024-07-29

## 2024-07-29 RX ORDER — CEFAZOLIN 1 G/1
INJECTION, POWDER, FOR SOLUTION INTRAVENOUS AS NEEDED
Status: DISCONTINUED | OUTPATIENT
Start: 2024-07-29 | End: 2024-07-29

## 2024-07-29 RX ORDER — ROPIVACAINE HYDROCHLORIDE 5 MG/ML
INJECTION, SOLUTION EPIDURAL; INFILTRATION; PERINEURAL AS NEEDED
Status: DISCONTINUED | OUTPATIENT
Start: 2024-07-29 | End: 2024-07-29

## 2024-07-29 RX ORDER — PROPOFOL 10 MG/ML
INJECTION, EMULSION INTRAVENOUS AS NEEDED
Status: DISCONTINUED | OUTPATIENT
Start: 2024-07-29 | End: 2024-07-29

## 2024-07-29 RX ORDER — MIDAZOLAM HYDROCHLORIDE 1 MG/ML
INJECTION INTRAMUSCULAR; INTRAVENOUS AS NEEDED
Status: DISCONTINUED | OUTPATIENT
Start: 2024-07-29 | End: 2024-07-29

## 2024-07-29 RX ORDER — SODIUM CHLORIDE, SODIUM LACTATE, POTASSIUM CHLORIDE, CALCIUM CHLORIDE 600; 310; 30; 20 MG/100ML; MG/100ML; MG/100ML; MG/100ML
INJECTION, SOLUTION INTRAVENOUS CONTINUOUS PRN
Status: DISCONTINUED | OUTPATIENT
Start: 2024-07-29 | End: 2024-07-29

## 2024-07-29 RX ORDER — HYDROMORPHONE HYDROCHLORIDE 1 MG/ML
0.2 INJECTION, SOLUTION INTRAMUSCULAR; INTRAVENOUS; SUBCUTANEOUS EVERY 5 MIN PRN
Status: DISCONTINUED | OUTPATIENT
Start: 2024-07-29 | End: 2024-07-29 | Stop reason: HOSPADM

## 2024-07-29 RX ORDER — HYDROMORPHONE HYDROCHLORIDE 1 MG/ML
INJECTION, SOLUTION INTRAMUSCULAR; INTRAVENOUS; SUBCUTANEOUS AS NEEDED
Status: DISCONTINUED | OUTPATIENT
Start: 2024-07-29 | End: 2024-07-29

## 2024-07-29 SDOH — HEALTH STABILITY: MENTAL HEALTH: CURRENT SMOKER: 0

## 2024-07-29 ASSESSMENT — PAIN - FUNCTIONAL ASSESSMENT
PAIN_FUNCTIONAL_ASSESSMENT: 0-10

## 2024-07-29 ASSESSMENT — PAIN SCALES - GENERAL
PAINLEVEL_OUTOF10: 6
PAINLEVEL_OUTOF10: 7
PAINLEVEL_OUTOF10: 5 - MODERATE PAIN
PAINLEVEL_OUTOF10: 0 - NO PAIN
PAINLEVEL_OUTOF10: 5 - MODERATE PAIN
PAINLEVEL_OUTOF10: 6
PAINLEVEL_OUTOF10: 5 - MODERATE PAIN
PAINLEVEL_OUTOF10: 5 - MODERATE PAIN

## 2024-07-29 NOTE — ANESTHESIA PREPROCEDURE EVALUATION
"Patient: Lindsey Herrera \"Marcela\"    Procedure Information       Date/Time: 07/29/24 0715    Procedure: Total laparoscopic hysterectomy, bilateral salpingo-oophorectomy, any other indicated procedures    Location: [facility] OR  / [facility] OR    Surgeons: Bessy Cain MD            Relevant Problems   Anesthesia   (+) PONV (postoperative nausea and vomiting)      Cardiac   (+) Abnormal EKG   (+) Arteriosclerosis of coronary artery   (+) Benign essential hypertension   (+) Essential hypertension   (+) Hyperlipidemia   (+) Primary hypertension      Neuro   (+) Anxiety   (+) Chronic lumbar radiculopathy      GI   (+) GERD (gastroesophageal reflux disease)   (+) Gastro-esophageal reflux disease without esophagitis   (+) Gastroesophageal reflux disease   (+) Hiatal hernia      /Renal   (+) Acute UTI      Liver   (+) Nonalcoholic steatohepatitis (TURK)      Endocrine   (+) Class 3 severe obesity due to excess calories with serious comorbidity and body mass index (BMI) of 45.0 to 49.9 in adult (Multi)   (+) DM (diabetes mellitus), type 2 (Multi)   (+) Diabetes mellitus type 2 in obese   (+) Type 2 diabetes mellitus without complication (Multi)      Musculoskeletal   (+) Lumbar stenosis with neurogenic claudication   (+) Neuroforaminal stenosis of lumbar spine      ID   (+) Acute UTI       Clinical information reviewed:   Tobacco  Allergies  Meds   Med Hx  Surg Hx  OB Status  Fam Hx  Soc   Hx        NPO Detail:  NPO/Void Status  Carbohydrate Drink Given Prior to Surgery? : N  Date of Last Liquid: 07/28/24  Time of Last Liquid: 2000  Date of Last Solid: 07/28/24  Time of Last Solid: 2000  Last Intake Type: Clear fluids  Time of Last Void: 0617         Physical Exam    Airway  Mallampati: II  TM distance: >3 FB  Neck ROM: limited  Comments: Can bite upper lip; a little decreased neck motion   Cardiovascular   Rhythm: regular  Rate: normal     Dental        Pulmonary   Breath sounds clear to " auscultation     Abdominal          Anesthesia Plan    History of general anesthesia?: yes  History of complications of general anesthesia?: no    ASA 3     general     The patient is not a current smoker.  Patient was not previously instructed to abstain from smoking on day of procedure.  Patient did not smoke on day of procedure.    intravenous induction   Trial extubation is planned.  Anesthetic plan and risks discussed with patient.  Use of blood products discussed with patient who consented to blood products.    Plan discussed with attending and resident.

## 2024-07-29 NOTE — ANESTHESIA POSTPROCEDURE EVALUATION
"Patient: Lindsey Herrera \"Marcela\"    Procedure Summary       Date: 07/29/24 Room / Location: [facility] OR 03 / [facility] OR    Anesthesia Start: 0725 Anesthesia Stop:     Procedure: Total laparoscopic hysterectomy, bilateral salpingo-oophorectomy, cystoscopy Diagnosis:       Pelvic mass      (Pelvic mass [R19.00])    Surgeons: Bessy Cain MD Responsible Provider: Tabby Rodrigues MD    Anesthesia Type: general ASA Status: 3            Anesthesia Type: general    Vitals Value Taken Time   /66 07/29/24 1207   Temp 36.3 °C (97.3 °F) 07/29/24 1207   Pulse 81 07/29/24 1207   Resp 24 07/29/24 1207   SpO2 98 % 07/29/24 1207       Anesthesia Post Evaluation    Patient location during evaluation: PACU  Patient participation: complete - patient cannot participate  Level of consciousness: sleepy but conscious  Pain management: adequate  Airway patency: patent  Cardiovascular status: acceptable  Respiratory status: acceptable  Hydration status: acceptable  Postoperative Nausea and Vomiting: none        No notable events documented.    "

## 2024-07-29 NOTE — ANESTHESIA PROCEDURE NOTES
Airway  Date/Time: 7/29/2024 8:09 AM  Urgency: elective    Airway not difficult    Staffing  Performed: resident   Authorized by: Tabby Rodrigues MD    Performed by: Yun Morgan MD  Patient location during procedure: OR    Indications and Patient Condition  Indications for airway management: anesthesia  Spontaneous Ventilation: absent  Sedation level: deep  Preoxygenated: yes  Patient position: sniffing  Mask difficulty assessment: 1 - vent by mask  Planned trial extubation    Final Airway Details  Final airway type: endotracheal airway      Successful airway: ETT  Cuffed: yes   Successful intubation technique: direct laryngoscopy  Facilitating devices/methods: intubating stylet  Endotracheal tube insertion site: oral  Blade: Asrah  Blade size: #3  ETT size (mm): 7.0  Cormack-Lehane Classification: grade I - full view of glottis  Placement verified by: chest auscultation and capnometry   Measured from: teeth  ETT to teeth (cm): 22  Number of attempts at approach: 1

## 2024-07-29 NOTE — CONSULTS
Lindsey Herrera is a 63 y.o. year old female patient who presents for Total laparoscopic hysterectomy BSO with Dr. Cain. Acute Pain consulted for block for postoperative pain control.     Anticipated Postop Pain Issues -   Palliative: typically relieved with IV analgesics and regional local anesthetics  Provocative: typically with movement  Quality: typically burning and aching  Radiation: typically none  Severity: typically severe 8-10/10  Timing: typically constant    Past Medical History:   Diagnosis Date    Adnexal mass     Chronic back pain     Chronic lumbar radiculopathy     GERD (gastroesophageal reflux disease)     Hiatal hernia     Hyperlipidemia, unspecified     Dyslipidemia    Hypertension     Lumbar stenosis with neurogenic claudication     Obesity     Other muscle spasm     Muscle spasm    PONV (postoperative nausea and vomiting)     Presence of right artificial hip joint     Proteinuria, unspecified     Restless legs syndrome     Tachycardia     Type 2 diabetes mellitus (Multi)     on Mounjaro, HgbA1c was 7.4 on 03/09/24    Uterine mass     Vision loss     Vitamin D deficiency, unspecified         Past Surgical History:   Procedure Laterality Date    CHOLECYSTECTOMY  2012    COLONOSCOPY      ENDOMETRIAL ABLATION  1999    HIP ARTHROPLASTY Right 2017    OVARIAN CYST REMOVAL  1999    TONSILLECTOMY      TUBAL LIGATION      WISDOM TOOTH EXTRACTION          Family History   Problem Relation Name Age of Onset    Diabetes Mother      COPD Mother      Thyroid disease Mother          Social History     Socioeconomic History    Marital status:      Spouse name: Not on file    Number of children: Not on file    Years of education: Not on file    Highest education level: Not on file   Occupational History    Not on file   Tobacco Use    Smoking status: Never     Passive exposure: Never    Smokeless tobacco: Never   Vaping Use    Vaping status: Never Used   Substance and Sexual Activity    Alcohol use: Yes      Alcohol/week: 3.0 standard drinks of alcohol     Types: 3 Standard drinks or equivalent per week    Drug use: Never    Sexual activity: Defer   Other Topics Concern    Not on file   Social History Narrative    Not on file     Social Determinants of Health     Financial Resource Strain: Not on file   Food Insecurity: Not on file   Transportation Needs: Not on file   Physical Activity: Not on file   Stress: Not on file   Social Connections: Not on file   Intimate Partner Violence: Not on file   Housing Stability: Not on file        Allergies   Allergen Reactions    Metoprolol Unknown and Other    Ace Inhibitors Unknown    Atorvastatin Unknown    Atorvastatin Calcium Dizziness and Unknown    Gabapentin Other     Irritability    Metformin Unknown and Diarrhea    Pseudoephedrine Hcl Other     Tingling in arms    Triamterene-Hydrochlorothiazid Unknown and Dizziness    Erythromycin GI Upset, Nausea/vomiting, Other and Unknown         Review of Systems  Gen: No fatigue, anorexia, insomnia, fever.   Eyes: No vision loss, double vision, drainage, eye pain.   ENT: No pharyngitis, dry mouth, no hearing changes or ear discharge  Cardiac: No chest pain, palpitations, syncope, near syncope.   Pulmonary: No shortness of breath, cough, hemoptysis.   Heme/lymph: No swollen glands, fever, bleeding.   GI: No abdominal pain, change in bowel habits, melena, hematemesis, hematochezia, nausea, vomiting, diarrhea.   : No discharge, dysuria, frequency, urgency, hematuria.  Endo: No polyuria or weight loss.   Musculoskeletal: Negative for any pain or loss of ROM/weakness  Skin: No rashes or lesions  Neuro: Normal speech, no numbness or weakness. No gait difficulties  Review of systems is otherwise negative unless stated above or in history of present illness.    Physical Exam:  Constitutional:  no distress, alert and cooperative  Eyes: clear sclera  Head/Neck: No apparent injury, trachea midline  Respiratory/Thorax: Patent airways,  thorax symmetric, breathing comfortably  Cardiovascular: no pitting edema  Gastrointestinal: Nondistended  Musculoskeletal: ROM intact  Extremities: no clubbing  Neurological: alert, laughlin x4  Psychological: Appropriate affect    Results for orders placed or performed during the hospital encounter of 07/29/24 (from the past 24 hour(s))   Verify ABO/Rh Group Test (VERAB)   Result Value Ref Range    ABO TYPE O     Rh TYPE NEG    POCT GLUCOSE   Result Value Ref Range    POCT Glucose 152 (H) 74 - 99 mg/dL   POCT glucose bedside   Result Value Ref Range    POC Fingerstick Blood Glucose 152 (A) 70 - 100 mg/dl   POCT GLUCOSE   Result Value Ref Range    POCT Glucose 194 (H) 74 - 99 mg/dL        Plan:    - Bilateral quadratus lumborum single shot blocks performed preoperatively on 07/29/24   - Pain medications per primary team  - Will see on POD1 if inpatient    Acute Pain Team  pg [phone] ph [phone].

## 2024-07-29 NOTE — DISCHARGE INSTRUCTIONS
.Laparoscopic Hysterectomy Discharge Instructions  If you have any questions about your care, please contact us at [phone].    Medications and Pain Management  Common areas of pain after laparoscopic hysterectomy include the incision pain, pain in between your shoulder blades, the pelvis and lower back. The gas that was used to distend your abdomen for the surgery is absorbed slowly into your blood stream over the first 3-4 days after surgery. It is not passed intestinally, although, because your abdomen is distended, it may feel similar to intestinal gas. Staying active and walking is the best way to promote the absorption of this gas.  Immediately after surgery, nerve pain is the most intense, typically for the first 6 to 12 hours. As the body heals, it creates inflammation around the incisions sites adding pressure and creating soreness. After 5 days, the inflammation begins to recede and significant improvement in soreness is expected. Pulling on the incisions, especially if sudden, such as when you cough, will reactivate the nerve pain. Support your abdomen with a pillow during coughing or sneezing as this will be helpful to minimize pain. There are two types of pain pills typically used for post-operative pain management, narcotics such as tramadol and an anti-inflammatory such as Ibuprofen or Naprosyn.  Taking regular anti-inflammatory pills, such as 600mg of Ibuprofen every 6 hours for the first 5 days and then as needed is recommended. You can alternate ibuprofen with tylenol (975mg or 1000mg). The tylenol can be taken every 6 hours.  If you have problems using NSAIDs, be sure to discuss this with your doctor. The narcotic can be used on a schedule for the first 1 to 2 days but after that, only as you need it. Narcotics can cause constipation, nausea, sleepiness and headaches. You may begin your usual home medications as you were taking before unless directed by your doctor.    Incisional care  Paper  tape steri-strips are typically used for the abdominal incisions. The steri-strips will fall off on their own or can be removed at your first post-operative appointment. You may shower and use a mild soap around the incisions and pat dry. Do not use a washcloth or scrub the incisions. Using peroxide or antiseptics is not recommended for routine care. Avoid hot and steamy showers as this may cause you to feel faint. No tub baths for six weeks following surgery. There may be discoloration or bruising around the incisions. This is normal and may take several weeks to resolve. Firmness or a nodular area under the skin near the incision may represent a collection of blood, this too will resolve on its own after a little time. If any incision develops tenderness, redness in the skin layer or has drainage please call the office.    Vaginal Discharge  You may have a mildly malodorous discharge and occasional spotting for up to 6 weeks. Do not put anything in the vagina like tampons or have sexual intercourse for 6 weeks after surgery. If you are having bleeding like a period, that is abnormal and you should contact your doctor.    GI Function, Nausea and Constipation  Nausea can occasionally be an issue in the first few days after surgery. It is usually caused as a side effect from the anesthesia and pain medicine, particularly narcotics. Taking the pain pills with food is a food way to proactively minimize this. Throwing up, especially after the first day, is not expected and if this happens, you should call your doctor. Feeling gassy and constipation can be a problem for the first week after surgery. Limiting the use of narcotics may be helpful. Stool softeners twice a day and a high fiber diet are safe. If needed, Miralax once daily is a good choice. If no bowel movement after 3 days, you will need to increase the Miralax until soft, regular bowel movements are passing.    Urinating  Because the bladder is disturbed by  the surgery, the normal sensation may be temporarily altered. You may not be aware that your bladder is full. If the bladder is allowed to get over distended, it may make the problem worse. This is why we make sure that you are able to empty your bladder adequately before you go home. For the first few days at home, you should make a point to empty your bladder every 3 to 4 hours. Pain with voiding, especially after the first day, is not expected and may represent a bladder infection.    Activity  For the first two days post-operatively, your soreness and recovery from anesthesia will limit your activity  Stairs are safe, just take your time  At a minimum during this time, you should walk around for 10-15 minutes every 2-3 hours. After that, in the first week, any activity except for overt exercise is safe.   During the first week you should not commit to being on your feet for more than 30 minutes at a time.   During the second week, light exercise is encouraged.  After 2 weeks from surgery, you should try to get back into regular activity other than heavy lifting.   For healing, please limit the amount of weight lifted to 8-10 pounds (a gallon of milk) for the first 6 weeks after surgery.   Driving is usually okay after the first few days. You must be able to comfortably wear a seatbelt, press the gas/brake pedals, and drive defensively. You may not drive while taking narcotic pain medicines.    When to Call the Doctor  Call for any fever above 100.4 F (If you do not feel feverish you do not have to routinely check your temperature.)  Call for severe pain not improved by medications  Call for persistent nausea, vomiting  Call for vaginal bleeding that is heavy as a period or passing blood clots larger than a quarter  Call for unusual swelling in your legs  Call if the incisions develop painful redness and discharge

## 2024-07-29 NOTE — OP NOTE
"Total laparoscopic hysterectomy, bilateral salpingo-oophorectomy, any other indicated procedures Operative Note     Date: 2024  OR Location: [facility] OR    Name: Lindsey Herrera \"Marcela\", : 1960, Age: 63 y.o., MRN: [id number], Sex: female    Diagnosis  Pre-op Diagnosis      * Pelvic mass [R19.00] Post-op Diagnosis     * Pelvic mass [R19.00]     Procedures  Total laparoscopic hysterectomy, bilateral salpingo-oophorectomy, any other indicated procedures  87251 - CO LAPAROSCOPY W TOTAL HYSTERECTOMY UTERUS 250 GM/<      Surgeons      * Bessy Cain - Primary    Resident/Fellow/Other Assistant:  Surgeons and Role:     * Neli Malcolm MD - Resident - Assisting     * Rosalia Womack MD - Resident - Assisting     * Karina Reaves MD - Fellow    Procedure Summary  Anesthesia: General  ASA: III  Anesthesia Staff: Anesthesiologist: Tabby Rodrigues MD  Anesthesia Resident: Yun Morgan MD  Estimated Blood Loss: 200mL  Intra-op Medications:   Administrations occurring from 0715 to 1055 on 24:   Medication Name Total Dose   sodium chloride 0.9 % irrigation solution 500 mL   heparin (porcine) injection 5,000 Units 5,000 Units              Anesthesia Record               Intraprocedure I/O Totals          Output    Urine 500 mL    Total Output 500 mL          Specimen:   ID Type Source Tests Collected by Time   1 : UTERUS, CERVIX, BILATERAL FALLOPIAN TUBES AND OVARIES Tissue UTERUS, CERVIX, FALLOPIAN TUBES AND OVARIES BILATERAL SURGICAL PATHOLOGY EXAM Bessy Cain MD 2024 1034        Staff:   Circulator: Geni  Scrub Person: Jarvis         Drains and/or Catheters:   [REMOVED] Urethral Catheter Non-latex 16 Fr. (Removed)       Tourniquet Times:         Implants:     Findings: large lower uterine/cervical mass consistent with CT findings (10cm in size), cystic right ovary and fimbriae, small cyst on left ovary, normal appearing fallopian tubes, bowel, bladder, liver, diaphragm.    Indications: Marcela " ELY Herrera is an 63 y.o. female who is having surgery for Pelvic mass [R19.00].     The patient was seen in the preoperative area. The risks, benefits, complications, treatment options, non-operative alternatives, expected recovery and outcomes were discussed with the patient. The possibilities of reaction to medication, pulmonary aspiration, injury to surrounding structures, bleeding, recurrent infection, the need for additional procedures, failure to diagnose a condition, and creating a complication requiring transfusion or operation were discussed with the patient. The patient concurred with the proposed plan, giving informed consent.  The site of surgery was properly noted/marked if necessary per policy. The patient has been actively warmed in preoperative area. Preoperative antibiotics have been ordered and given within 1 hours of incision. Venous thrombosis prophylaxis have been ordered including bilateral sequential compression devices    Procedure Details: .After informed consent was confirmed, the patient was taken to the operating room with an IV in place. A preoperative huddle and timeout were performed, with all OR personnel confirming correct patient and procedure. The patient was moved to the operating room table and SCDs were placed.  Heparin was administered.  General anesthesia was induced without difficulty. She was then placed in the dorsal lithotomy position on the operating room table using Aditya stirrups. She was prepped and draped in a normal sterile fashion for a laparoscopic hysterectomy. A surgical pause was performed. The patient's identity and surgical procedure were again confirmed by all the surgical personnel. The patient received preoperative antibiotics.    A funes catheter was placed.  A EEA sizer was then placed as a uterine manipulator.     We then turned our attention to the laparoscopic portion of the operation. A 12mm vertical skin incision was made superior to umbilical area.  This was carried down to the level of the fascia with two S retractors. The fascia was elevated with 2 kocher clamps and incised with a scalpel.  Both sides of the fascia were then tagged with 0-vicryl suture.  The peritoneum was then elevated with 2 hemostats and was entered sharply with metzenbaum scissors.  The matthias port was then placed. CO2 was then insufflated into the abdomen.     Once this was accomplished, we then carefully inspected the abdomen and there was no evidence of injury. The patient was placed in deep Trendelenburg. We then placed three 5-mm accessory ports.  All were placed under direct visualization after infiltration with Marcaine.  The small bowel was manipulated out of the pelvis.     The uterus was placed on traction. The round ligaments were sealed and divided with the ligasure device. The broad ligament was dissected cephalad and caudad, and a bladder flap was created. This was carried to the opposite side, where the round ligament was also divided. The peritoneum was taken down parallel to the bilateral IP ligaments with Bovie cautery and bilateral ureters were identified. A window was created between the two and the IP ligaments were cauterized and then divided with the ligasure device.       The anterior and posterior leaves of the broad ligament were bluntly .   At the vesicouterine fold the peritoneum was incised transversely and the bladder sharply dissected off the lower uterine segment and upper vagina.  The posterior peritoneum was gently brought down from the uterus.  The uterine vessels were skeletonized then cauterized and divided with the ligasure device followed by the cardinal ligaments and the uterosacral ligaments in a similar fashion.      We then began the colpotomy. Monopolar bovie was used and the cervix was released circumferentially over the cervical cup.  The uterus, tubes and ovaries and cervix were delivered transvaginally without difficulty.       Bubble test was performed and normal.    The colpotomy was closed laparoscopically with a #0 V-loc.  Hemostasis was achieved.  The abdomen was copiously irrigated. Hemostasis was noted.   Cystoscopy was performed and normal ureteral jets were noted bilaterally.  The bladder appeared normal.     The 5mm ports were all removed under direct visualization.  The 12mm port was removed and the fascia was then closed with another figure-of-eight suture of 0-vicryl followed by tying the two previously-tagged end of fascia together.  The port sites were all irrigated.  Hemostasis was noted.  Ports were closed using 4-0 monocryl in a subcuticular fashion, followed by steristrips.      The patient was awoken from general anesthesia without difficulty.    Sponge, needle, instrument counts were correct x 2.    Complications:  None; patient tolerated the procedure well.    Disposition: PACU - hemodynamically stable.  Condition: stable       Attending Attestation: I was present for the entirety of the procedure(s).     MD Bessy Sanchez MD Lindsay A Ferguson  Phone Number: [phone]

## 2024-07-29 NOTE — ANESTHESIA PROCEDURE NOTES
Peripheral Block    Patient location during procedure: pre-op  Start time: 7/29/2024 11:35 AM  End time: 7/29/2024 11:50 AM  Reason for block: at surgeon's request and post-op pain management  Staffing  Performed: resident   Authorized by: Emily Michaud MD    Performed by: Marlys Thurman MD  Preanesthetic Checklist  Completed: patient identified, IV checked, site marked, risks and benefits discussed, surgical consent, monitors and equipment checked, pre-op evaluation and timeout performed   Timeout performed at: 7/29/2024 11:35 AM  Peripheral Block  Patient position: laying flat  Prep: ChloraPrep  Patient monitoring: heart rate and continuous pulse ox  Block type: QL  Laterality: B/L  Injection technique: single-shot  Guidance: ultrasound guided  Local infiltration: ropivacaine  Needle  Needle type: Tuohy   Needle gauge: 26 G  Needle length: 8 cm  Needle localization: ultrasound guidance     image stored in chart  Assessment  Injection assessment: negative aspiration for heme, no paresthesia on injection, incremental injection and local visualized surrounding nerve on ultrasound  Additional Notes  QL single shot. informed consent obtained. risks and benefits discussed. ASA monitors placed, timeout performed. Pt positioned, prepped with chlorhexidine, draped with sterile towels. Ultrasound guidance used with visualization of the needle throughout duration of the procedure. Aspiration was negative. A total of 30 cc 0.5% ropivacaine, 50mcg epinephrine, and 4mg decadron injected between both sides. Patient tolerated procedure well.     Timeout by Sippler, RN

## 2024-07-30 ENCOUNTER — APPOINTMENT (OUTPATIENT)
Dept: PREADMISSION TESTING | Facility: HOSPITAL | Age: 64
End: 2024-07-30
Payer: COMMERCIAL

## 2024-08-02 ENCOUNTER — APPOINTMENT (OUTPATIENT)
Dept: PAIN MEDICINE | Facility: HOSPITAL | Age: 64
End: 2024-08-02
Payer: COMMERCIAL

## 2024-08-08 LAB
LABORATORY COMMENT REPORT: NORMAL
PATH REPORT.COMMENTS IMP SPEC: NORMAL
PATH REPORT.FINAL DX SPEC: NORMAL
PATH REPORT.GROSS SPEC: NORMAL
PATH REPORT.RELEVANT HX SPEC: NORMAL
PATH REPORT.TOTAL CANCER: NORMAL

## 2024-08-12 ENCOUNTER — PHARMACY VISIT (OUTPATIENT)
Dept: PHARMACY | Facility: CLINIC | Age: 64
End: 2024-08-12
Payer: COMMERCIAL

## 2024-08-12 DIAGNOSIS — E11.9 TYPE 2 DIABETES MELLITUS WITHOUT COMPLICATION, WITHOUT LONG-TERM CURRENT USE OF INSULIN (MULTI): Primary | ICD-10-CM

## 2024-08-12 DIAGNOSIS — G25.81 RESTLESS LEG SYNDROME: ICD-10-CM

## 2024-08-12 PROCEDURE — RXMED WILLOW AMBULATORY MEDICATION CHARGE

## 2024-08-12 RX ORDER — TIRZEPATIDE 7.5 MG/.5ML
7.5 INJECTION, SOLUTION SUBCUTANEOUS
Qty: 6 ML | Refills: 3 | Status: SHIPPED | OUTPATIENT
Start: 2024-08-12 | End: 2025-08-12

## 2024-08-12 RX ORDER — ROPINIROLE 1 MG/1
1 TABLET, FILM COATED ORAL NIGHTLY
Qty: 90 TABLET | Refills: 0 | Status: SHIPPED | OUTPATIENT
Start: 2024-08-12

## 2024-08-13 PROCEDURE — RXMED WILLOW AMBULATORY MEDICATION CHARGE

## 2024-08-15 ENCOUNTER — PHARMACY VISIT (OUTPATIENT)
Dept: PHARMACY | Facility: CLINIC | Age: 64
End: 2024-08-15
Payer: COMMERCIAL

## 2024-08-19 NOTE — PROGRESS NOTES
Patient ID: Marcela Herrera is a 63 y.o. female.  Referring Physician: No referring provider defined for this encounter.  Primary Care Provider: Rachell Gonzalez MD      Subjective    Interval History:  She is overall doing well, taking Miralax for bowel regularity, 1 incision is a little erythematous, otherwise she is doing well post operatively and notes 1 terrible migraine upon waking the other day.    She denies fever, chills, chest pain, SOB, nausea, vomiting, diarrhea, constipation, dysuria, or any other concerning signs of symptoms.        63 y.o. presenting with adnexal and cervical mass now s/p TLH/BSO with benign pathology      PMH:  HTN  RLS  Low back pain  DM takes Mounjaro  Hiatal hernia    PSH:  Endometrial ablation and ovarian cystectomy   Cholecystectomy 2012  Right hip replacement 2017  Tonsillectomy  Tubal ligation  TLH/BSO    OBHx:  The patient is a   x2. She underwent menopause more than 5 years ago denies PMB. She used COCs for less than 1 year. She did not use HRT.    Social:  They uses alcohol and tobacco socially and denies recreational drug use. The patient lives at home with her  and in laws. The patient works as a .     FamHx:  Mother Hx of cervical cancer   Their history is otherwise negative for a history of breast, ovarian, uterine, colon, pancreatic, and GI cancer.     Screening:  Cervical cancer: 2024, NILM HPV neg  Mammogram:  BIRADS 2  Colonoscopy: over 5 years ago results showed diverticulosis      Review of Systems - Oncology     Objective   BSA: 2.5 meters squared  /88 (BP Location: Left arm, Patient Position: Sitting, BP Cuff Size: Large adult)   Pulse 85   Temp 35.8 °C (96.4 °F) (Temporal)   Resp 16   Wt 132 kg (291 lb 14.2 oz)   SpO2 95%   BMI 45.81 kg/m²      Family History   Problem Relation Name Age of Onset    Diabetes Mother      COPD Mother      Thyroid disease Mother         Lindsey Herrera  reports that she has never  smoked. She has never been exposed to tobacco smoke. She has never used smokeless tobacco.  She  reports current alcohol use of about 3.0 standard drinks of alcohol per week.  She  reports no history of drug use.    Physical Exam  Constitutional:       General: She is not in acute distress.     Appearance: Normal appearance. She is not toxic-appearing.   HENT:      Head: Normocephalic.      Mouth/Throat:      Mouth: Mucous membranes are moist.      Pharynx: Oropharynx is clear.   Eyes:      Extraocular Movements: Extraocular movements intact.      Conjunctiva/sclera: Conjunctivae normal.      Pupils: Pupils are equal, round, and reactive to light.   Cardiovascular:      Rate and Rhythm: Normal rate and regular rhythm.      Heart sounds: Normal heart sounds. No murmur heard.     No friction rub. No gallop.   Pulmonary:      Effort: Pulmonary effort is normal.      Breath sounds: Normal breath sounds. No wheezing or rhonchi.   Abdominal:      General: Bowel sounds are normal. There is no distension.      Palpations: Abdomen is soft.      Tenderness: There is no abdominal tenderness.      Comments: Well healed laparoscopic incision sites noted on the abdomen, mild erythema surrounding the supraumbilical and LLQ port sites    Genitourinary:     Comments:     Musculoskeletal:         General: Normal range of motion.      Cervical back: Normal range of motion.   Skin:     General: Skin is warm.   Neurological:      General: No focal deficit present.      Mental Status: She is alert and oriented to person, place, and time.   Psychiatric:         Mood and Affect: Mood normal.         Behavior: Behavior normal.       Surgical Pathology Exam: [id number]  Order: [id number]   Collected 7/29/2024 10:34       Status: Final result       Visible to patient: Yes (seen)       Dx: Pelvic mass    0 Result Notes      Component    FINAL DIAGNOSIS   A. UTERUS, CERVIX, BILATERAL FALLOPIAN TUBES AND OVARIES, HYSTERECTOMY AND BILATERAL  SALPINGO-OOPHORECTOMY:   -- Cervix with leiomyoma and nabothian cysts, see comment  -- Inactive/weakly proliferative endometrium with cystic changes  -- Myometrium with no pathologic diagnosis   -- Ovary #2 with serous cystadenofibroma and surface adhesions to fallopian tube  -- Ovary #1 with cortical inclusion cysts  -- Bilateral fallopian tubes with paratubal cysts          Performance Status:  Asymptomatic    Assessment/Plan      Oncology History    No history exists.   63 y.o. with pelvic mass s/p TLH/BSO with benign pathology     # Post-op  - Recovering well  - Discussed ongoing restrictions (no lifting more than 10-15lbs, nothing per vagina, no soaking) for 3 more weeks   - Path reviewed and benign   - Follow-up as needed         Scribe Attestation  By signing my name below, I, Daisy Snyder, Scribe   attest that this documentation has been prepared under the direction and in the presence of Bessy Cain MD.

## 2024-08-20 ENCOUNTER — OFFICE VISIT (OUTPATIENT)
Dept: GYNECOLOGIC ONCOLOGY | Facility: CLINIC | Age: 64
End: 2024-08-20
Payer: COMMERCIAL

## 2024-08-20 VITALS
OXYGEN SATURATION: 95 % | HEART RATE: 85 BPM | SYSTOLIC BLOOD PRESSURE: 126 MMHG | TEMPERATURE: 96.4 F | RESPIRATION RATE: 16 BRPM | WEIGHT: 291.89 LBS | BODY MASS INDEX: 45.81 KG/M2 | DIASTOLIC BLOOD PRESSURE: 88 MMHG

## 2024-08-20 DIAGNOSIS — Z90.710 S/P HYSTERECTOMY WITH OOPHORECTOMY: Primary | ICD-10-CM

## 2024-08-20 DIAGNOSIS — Z90.721 S/P HYSTERECTOMY WITH OOPHORECTOMY: Primary | ICD-10-CM

## 2024-08-20 PROCEDURE — 3074F SYST BP LT 130 MM HG: CPT | Performed by: STUDENT IN AN ORGANIZED HEALTH CARE EDUCATION/TRAINING PROGRAM

## 2024-08-20 PROCEDURE — 3049F LDL-C 100-129 MG/DL: CPT | Performed by: STUDENT IN AN ORGANIZED HEALTH CARE EDUCATION/TRAINING PROGRAM

## 2024-08-20 PROCEDURE — 3044F HG A1C LEVEL LT 7.0%: CPT | Performed by: STUDENT IN AN ORGANIZED HEALTH CARE EDUCATION/TRAINING PROGRAM

## 2024-08-20 PROCEDURE — 99211 OFF/OP EST MAY X REQ PHY/QHP: CPT | Performed by: STUDENT IN AN ORGANIZED HEALTH CARE EDUCATION/TRAINING PROGRAM

## 2024-08-20 PROCEDURE — 3060F POS MICROALBUMINURIA REV: CPT | Performed by: STUDENT IN AN ORGANIZED HEALTH CARE EDUCATION/TRAINING PROGRAM

## 2024-08-20 PROCEDURE — 3079F DIAST BP 80-89 MM HG: CPT | Performed by: STUDENT IN AN ORGANIZED HEALTH CARE EDUCATION/TRAINING PROGRAM

## 2024-08-20 PROCEDURE — 1036F TOBACCO NON-USER: CPT | Performed by: STUDENT IN AN ORGANIZED HEALTH CARE EDUCATION/TRAINING PROGRAM

## 2024-08-20 ASSESSMENT — PAIN SCALES - GENERAL: PAINLEVEL: 0-NO PAIN

## 2024-08-21 ENCOUNTER — APPOINTMENT (OUTPATIENT)
Dept: PRIMARY CARE | Facility: CLINIC | Age: 64
End: 2024-08-21
Payer: COMMERCIAL

## 2024-08-23 ENCOUNTER — PHARMACY VISIT (OUTPATIENT)
Dept: PHARMACY | Facility: CLINIC | Age: 64
End: 2024-08-23
Payer: COMMERCIAL

## 2024-08-23 PROCEDURE — RXMED WILLOW AMBULATORY MEDICATION CHARGE

## 2024-09-02 ENCOUNTER — TELEMEDICINE (OUTPATIENT)
Dept: PRIMARY CARE | Facility: CLINIC | Age: 64
End: 2024-09-02
Payer: COMMERCIAL

## 2024-09-02 DIAGNOSIS — R21 RASH: ICD-10-CM

## 2024-09-02 DIAGNOSIS — H60.313 ACUTE DIFFUSE OTITIS EXTERNA OF BOTH EARS: Primary | ICD-10-CM

## 2024-09-02 PROBLEM — L03.032 CELLULITIS OF TOE OF LEFT FOOT: Status: RESOLVED | Noted: 2023-04-10 | Resolved: 2024-09-02

## 2024-09-02 PROBLEM — K21.9 GERD (GASTROESOPHAGEAL REFLUX DISEASE): Status: RESOLVED | Noted: 2023-04-10 | Resolved: 2024-09-02

## 2024-09-02 PROBLEM — Z98.890 PONV (POSTOPERATIVE NAUSEA AND VOMITING): Status: RESOLVED | Noted: 2024-07-29 | Resolved: 2024-09-02

## 2024-09-02 PROBLEM — K21.9 GASTROESOPHAGEAL REFLUX DISEASE: Status: RESOLVED | Noted: 2023-11-20 | Resolved: 2024-09-02

## 2024-09-02 PROBLEM — Z90.710 S/P HYSTERECTOMY WITH OOPHORECTOMY: Status: RESOLVED | Noted: 2024-08-20 | Resolved: 2024-09-02

## 2024-09-02 PROBLEM — Z90.721 S/P HYSTERECTOMY WITH OOPHORECTOMY: Status: RESOLVED | Noted: 2024-08-20 | Resolved: 2024-09-02

## 2024-09-02 PROBLEM — R11.2 PONV (POSTOPERATIVE NAUSEA AND VOMITING): Status: RESOLVED | Noted: 2024-07-29 | Resolved: 2024-09-02

## 2024-09-02 PROBLEM — N39.0 ACUTE UTI: Status: RESOLVED | Noted: 2023-07-07 | Resolved: 2024-09-02

## 2024-09-02 PROCEDURE — 3044F HG A1C LEVEL LT 7.0%: CPT | Performed by: NURSE PRACTITIONER

## 2024-09-02 PROCEDURE — 3060F POS MICROALBUMINURIA REV: CPT | Performed by: NURSE PRACTITIONER

## 2024-09-02 PROCEDURE — 3049F LDL-C 100-129 MG/DL: CPT | Performed by: NURSE PRACTITIONER

## 2024-09-02 PROCEDURE — 1036F TOBACCO NON-USER: CPT | Performed by: NURSE PRACTITIONER

## 2024-09-02 PROCEDURE — 99213 OFFICE O/P EST LOW 20 MIN: CPT | Performed by: NURSE PRACTITIONER

## 2024-09-02 RX ORDER — METHYLPREDNISOLONE 4 MG/1
TABLET ORAL
Qty: 21 TABLET | Refills: 0 | Status: SHIPPED | OUTPATIENT
Start: 2024-09-02

## 2024-09-02 RX ORDER — HYDROXYZINE HYDROCHLORIDE 25 MG/1
25 TABLET, FILM COATED ORAL EVERY 8 HOURS PRN
Qty: 20 TABLET | Refills: 0 | Status: SHIPPED | OUTPATIENT
Start: 2024-09-02

## 2024-09-02 RX ORDER — OFLOXACIN 3 MG/ML
5 SOLUTION AURICULAR (OTIC) 2 TIMES DAILY
Qty: 10 ML | Refills: 0 | Status: SHIPPED | OUTPATIENT
Start: 2024-09-02 | End: 2024-09-09

## 2024-09-02 NOTE — ASSESSMENT & PLAN NOTE
- ofloxacin x 7 days twice a day   -  do not use Q-tips  -  keep ear dry  -red flags discussed  - if no improvement in one week please follow up

## 2024-09-02 NOTE — PROGRESS NOTES
Subjective   Patient ID: Marcela Herrera is a 63 y.o. female who presents for No chief complaint on file..    This visit was completed via [employer] [facility] due to the restrictions of the COVID-19 pandemic. All issues as below were discussed and addressed but no physical exam was performed. If it was felt that the patient should be evaluated in clinic than they were directed there. The patient verbally consented to the visit.  Patient is located in [place] and verified   Presents with complaints of ear pain and drainage bilaterally for 5 days  States that hearing feels 'muffled' in bilateral ears   Did have rash to neck start yesterday - has been itchy as well.  Denies new soaps, lotions, or medicines.  Rash has not spread.  Tried benadryl but is not helping   Started mounjaro a few months ago - had dose increase 7.5mg yesterday early in the morning - but already had rash to neck  Has tried OTC drops without relief  States ears feel itchy.  Has crusting due to drainage.         Review of Systems   All other systems reviewed and are negative.      Objective   There were no vitals taken for this visit.    Physical Exam    Assessment/Plan   Problem List Items Addressed This Visit             ICD-10-CM    Acute otitis externa - Primary H60.509     - ofloxacin x 7 days twice a day   -  do not use Q-tips  -  keep ear dry  -red flags discussed  - if no improvement in one week please follow up           Relevant Medications    ofloxacin (Floxin) 0.3 % otic solution    methylPREDNISolone (Medrol Dospak) 4 mg tablets    hydrOXYzine HCL (Atarax) 25 mg tablet    Rash R21     Likely contact dermatitis   Medrol dose pack  Hydroxyzine every 8 hours as needed for itching sent in  Stop benadryl  Can apply cool compress 2-3 times a day for 20 minutes to help with itchy  Follow up if no improvement in 5 days

## 2024-09-02 NOTE — ASSESSMENT & PLAN NOTE
Likely contact dermatitis   Medrol dose pack  Hydroxyzine every 8 hours as needed for itching sent in  Stop benadryl  Can apply cool compress 2-3 times a day for 20 minutes to help with itchy  Follow up if no improvement in 5 days   5

## 2024-09-16 ENCOUNTER — PHARMACY VISIT (OUTPATIENT)
Dept: PHARMACY | Facility: CLINIC | Age: 64
End: 2024-09-16
Payer: COMMERCIAL

## 2024-09-16 DIAGNOSIS — M62.838 MUSCLE SPASM: ICD-10-CM

## 2024-09-16 PROCEDURE — RXMED WILLOW AMBULATORY MEDICATION CHARGE

## 2024-09-16 RX ORDER — TIZANIDINE 2 MG/1
TABLET ORAL
Qty: 30 TABLET | Refills: 0 | OUTPATIENT
Start: 2024-09-16

## 2024-10-15 PROCEDURE — RXMED WILLOW AMBULATORY MEDICATION CHARGE

## 2024-10-16 ENCOUNTER — PHARMACY VISIT (OUTPATIENT)
Dept: PHARMACY | Facility: CLINIC | Age: 64
End: 2024-10-16
Payer: COMMERCIAL

## 2024-10-28 ENCOUNTER — APPOINTMENT (OUTPATIENT)
Dept: ENDOCRINOLOGY | Facility: CLINIC | Age: 64
End: 2024-10-28
Payer: COMMERCIAL

## 2024-10-28 VITALS
HEART RATE: 87 BPM | BODY MASS INDEX: 45.99 KG/M2 | WEIGHT: 293 LBS | DIASTOLIC BLOOD PRESSURE: 78 MMHG | SYSTOLIC BLOOD PRESSURE: 135 MMHG

## 2024-10-28 DIAGNOSIS — E11.9 TYPE 2 DIABETES MELLITUS WITHOUT COMPLICATION, WITHOUT LONG-TERM CURRENT USE OF INSULIN (MULTI): ICD-10-CM

## 2024-10-28 LAB — POC HEMOGLOBIN A1C: 6.8 % (ref 4.2–6.5)

## 2024-10-28 PROCEDURE — 1036F TOBACCO NON-USER: CPT | Performed by: INTERNAL MEDICINE

## 2024-10-28 PROCEDURE — RXMED WILLOW AMBULATORY MEDICATION CHARGE

## 2024-10-28 PROCEDURE — 83036 HEMOGLOBIN GLYCOSYLATED A1C: CPT | Performed by: INTERNAL MEDICINE

## 2024-10-28 PROCEDURE — 3060F POS MICROALBUMINURIA REV: CPT | Performed by: INTERNAL MEDICINE

## 2024-10-28 PROCEDURE — 3049F LDL-C 100-129 MG/DL: CPT | Performed by: INTERNAL MEDICINE

## 2024-10-28 PROCEDURE — 3075F SYST BP GE 130 - 139MM HG: CPT | Performed by: INTERNAL MEDICINE

## 2024-10-28 PROCEDURE — 99214 OFFICE O/P EST MOD 30 MIN: CPT | Performed by: INTERNAL MEDICINE

## 2024-10-28 PROCEDURE — 3078F DIAST BP <80 MM HG: CPT | Performed by: INTERNAL MEDICINE

## 2024-10-28 PROCEDURE — 3044F HG A1C LEVEL LT 7.0%: CPT | Performed by: INTERNAL MEDICINE

## 2024-10-28 RX ORDER — TIRZEPATIDE 10 MG/.5ML
10 INJECTION, SOLUTION SUBCUTANEOUS
Qty: 6 ML | Refills: 3 | Status: SHIPPED | OUTPATIENT
Start: 2024-10-28 | End: 2025-10-28

## 2024-10-28 ASSESSMENT — ENCOUNTER SYMPTOMS
DIARRHEA: 0
NERVOUS/ANXIOUS: 0
FEVER: 0
NAUSEA: 0
POLYDIPSIA: 0
VOMITING: 0
CONSTIPATION: 0
SORE THROAT: 0
APPETITE CHANGE: 0
HEADACHES: 0
ARTHRALGIAS: 0
ABDOMINAL PAIN: 0
FREQUENCY: 0
COUGH: 0
SHORTNESS OF BREATH: 0

## 2024-10-30 ENCOUNTER — PHARMACY VISIT (OUTPATIENT)
Dept: PHARMACY | Facility: CLINIC | Age: 64
End: 2024-10-30
Payer: COMMERCIAL

## 2024-11-08 ENCOUNTER — PHARMACY VISIT (OUTPATIENT)
Dept: PHARMACY | Facility: CLINIC | Age: 64
End: 2024-11-08
Payer: COMMERCIAL

## 2024-11-08 ENCOUNTER — OFFICE VISIT (OUTPATIENT)
Dept: PAIN MEDICINE | Facility: HOSPITAL | Age: 64
End: 2024-11-08
Payer: COMMERCIAL

## 2024-11-08 VITALS
SYSTOLIC BLOOD PRESSURE: 179 MMHG | OXYGEN SATURATION: 96 % | RESPIRATION RATE: 16 BRPM | TEMPERATURE: 97.7 F | BODY MASS INDEX: 45.67 KG/M2 | DIASTOLIC BLOOD PRESSURE: 97 MMHG | HEART RATE: 96 BPM | HEIGHT: 67 IN | WEIGHT: 291 LBS

## 2024-11-08 DIAGNOSIS — M47.817 FACET ARTHROPATHY, LUMBOSACRAL: ICD-10-CM

## 2024-11-08 DIAGNOSIS — Z79.899 MEDICATION MANAGEMENT: ICD-10-CM

## 2024-11-08 DIAGNOSIS — M48.062 LUMBAR STENOSIS WITH NEUROGENIC CLAUDICATION: ICD-10-CM

## 2024-11-08 DIAGNOSIS — M54.16 CHRONIC LUMBAR RADICULOPATHY: Primary | ICD-10-CM

## 2024-11-08 LAB
AMPHETAMINES UR QL SCN: NORMAL
BARBITURATES UR QL SCN: NORMAL
BZE UR QL SCN: NORMAL
CANNABINOIDS UR QL SCN: NORMAL
CREAT UR-MCNC: 41.4 MG/DL (ref 20–320)
PCP UR QL SCN: NORMAL

## 2024-11-08 PROCEDURE — RXMED WILLOW AMBULATORY MEDICATION CHARGE

## 2024-11-08 PROCEDURE — 99213 OFFICE O/P EST LOW 20 MIN: CPT | Performed by: NURSE PRACTITIONER

## 2024-11-08 PROCEDURE — 80307 DRUG TEST PRSMV CHEM ANLYZR: CPT | Performed by: NURSE PRACTITIONER

## 2024-11-08 RX ORDER — TOPIRAMATE 25 MG/1
25 TABLET ORAL NIGHTLY
Qty: 90 TABLET | Refills: 2 | Status: SHIPPED | OUTPATIENT
Start: 2024-11-08

## 2024-11-08 ASSESSMENT — ENCOUNTER SYMPTOMS
NECK STIFFNESS: 0
ARTHRALGIAS: 1
RESPIRATORY NEGATIVE: 1
NECK PAIN: 0
NEUROLOGICAL NEGATIVE: 1
CARDIOVASCULAR NEGATIVE: 1
BACK PAIN: 1
GASTROINTESTINAL NEGATIVE: 1
ALLERGIC/IMMUNOLOGIC NEGATIVE: 1
CONSTITUTIONAL NEGATIVE: 1
EYES NEGATIVE: 1
JOINT SWELLING: 0
PSYCHIATRIC NEGATIVE: 1
MYALGIAS: 1
ENDOCRINE NEGATIVE: 1

## 2024-11-08 ASSESSMENT — PAIN SCALES - GENERAL: PAINLEVEL_OUTOF10: 8

## 2024-11-08 NOTE — PATIENT INSTRUCTIONS
Continue with your prescribed medications.    I sent your topiramate refill to your pharmacy.  Continue taking 25 mg at night.  You may increase to 50 mg at night in a month if tolerated.      Call the office if needing follow-up appointment

## 2024-11-08 NOTE — PROGRESS NOTES
Last urine drug screening date/ordered today: 5/10/2024  Results of last screen: As expected      Opioid Risk Screening:   THE OPIOID RISK TOOL (ORT)                                                                      Female                     Male     1. Family History of Substance Abuse:                              Alcohol                                                   [1]= 0                          [3]  =     Illicit Drugs                                             [2] = 0                         [3]   =    Prescription Drugs                                [4]=    0                       [4]   =    2. Personal History of Substance Abuse:     Alcohol                                                    [3] =     0                     [3] =     Illegal Drugs                                           [4] =0                           [4]  =     Prescription Drugs                                [5]= 0                           [5]   =    3. Age (If between 16 to 45):               [1]= 0                          [1]   =    4. History of Preadolescent Sexual Abuse                                                                  [3]=     0                       [0]   =    5. Psychological Disease:    ADD, OCD, Bipolar, Schizophrenia    [2]= 0                          [2]   =    Depression                                          [1]=   0                          [1]   =      TOTAL Score =  0     Last opioid risk screening date/ordered today: 11/8/2024  Patient's total score is 0    Reference :  Low Score = 0 to 3  Moderate Score = 4 to 7  High Score = =8       Pain Scale Screening:   Pain Assessment and Documentation Tool (PADT)   Date of Assessment: 11/8/2024  Analgesia:   Patient reports her pain level on average during the past week is 2on a 0 - 10 scale.   Patient reports that her pain level at its worst during the past week was 6 on a 0 -10 scale.   80% of pain has been relieved during the past week per  patient   Patient states that the amount of pain relief she is now obtaining from her current pain reliever(s) is enough to make a real difference in her life.   Query to clinician: Is the patient's pain relief clinically significant? yes  Activities of Daily Living:   Physical functioning: unchanged  Family relationships: unchanged  Social relationships: unchanged  Mood: unchanged  Sleep patterns: unchanged  Overall functioning: unchanged  Adverse Events: No, Lindsey Herrera is not experiencing side effects from current pain reliever.  Patients overall severity of side effect:none  Specific Analgesic Plan: Continue present regimen.

## 2024-11-08 NOTE — PROGRESS NOTES
Subjective   Marcela Herrera is a pleasant 64 y.o. female who is here for postprocedure follow-up.  Patient is ambulatory without assistive device.  Gait is steady.  She arrives by herself.    Patient had left L4-L5 and L5-S1 transforaminal epidural steroid injection done on 6/4/2024.  The injection has improved her back pain.  She reports it provided more than 80% relief.  She is still feeling better.  The injection has improved her pain, ability to participate in her daily activities, ability to perform her job and ability to enjoy social activities.  Patient reports injection really works however she had other things going on at that time.    Patient continues to have chronic lower back pain.  She rates her pain as 2 out of 10.  Pain comes and goes depending on her activities.  She describes her pain as aching, burning and tightness kind of pain.  Pain is aggravated with prolonged standing or certain activities.  Bending to the leg raise aggravates her pain.  Doing dishes also aggravates her back pain.  She denies pain, numbness or tingling sensation to her legs.  She denies leg weakness or change in balance.  She denies bowel or bladder incontinence.  She denies recent falls, injuries, or inciting events.    Patient reports that she had total hysterectomy done at the end of July.  She was tested for cancer and it was negative.    Patient was taking topiramate 25 mg at night.  She reports she had no side effect from this medication however she ran out of this prescription.  She takes tizanidine as needed for muscle pain relief.  She takes Tylenol on occasion.  She denies side effects to her medications.    I reviewed previous notes and imaging.  I discussed the plan of care including pharmacologic and joint differential procedure.  I discussed lumbar facet.  Patient prefers to see if the medication works.  I will see her for follow-up visit.  Questions were answered during this encounter.    The patient was  counseled regarding diagnostic results, instructions for management, risk factor reductions, risks and benefits of treatment options and importance of compliance with treatment.    --------------  PROCEDURES:    6/4/2024: Left L4-L5 and L5-S1 TFESI  3/12/2024: Left SI joint injection  12/19/2023: Lumbar epidural block #2 at L5-S1  10/24/2023: Lumbar epidural block #1  -----------    OARRS:  Jocelyn Quiñones, EVITA-CNP, DNP on 11/8/2024 12:41 PM  I have personally reviewed the OARRS report for Lindsey Herrera. I have considered the risks of abuse, dependence, addiction and diversion    Review of Systems   Constitutional: Negative.    HENT: Negative.     Eyes: Negative.    Respiratory: Negative.     Cardiovascular: Negative.    Gastrointestinal: Negative.    Endocrine: Negative.    Genitourinary: Negative.    Musculoskeletal:  Positive for arthralgias, back pain and myalgias. Negative for gait problem, joint swelling, neck pain and neck stiffness.   Skin: Negative.    Allergic/Immunologic: Negative.    Neurological: Negative.    Psychiatric/Behavioral: Negative.            BP (!) 179/97 (BP Location: Right arm, Patient Position: Sitting, BP Cuff Size: Adult)   Pulse 96   Temp 36.5 °C (97.7 °F) (Temporal)   Resp 16   Wt 132 kg (291 lb)   SpO2 96%  Body mass index is 45.58 kg/m².      Objective       Past Medical History  She has a past medical history of Acute UTI (07/07/2023), Adnexal mass, Cellulitis of toe of left foot (04/10/2023), Chronic back pain, Chronic lumbar radiculopathy, Gastroesophageal reflux disease (11/20/2023), GERD (gastroesophageal reflux disease), GERD (gastroesophageal reflux disease) (04/10/2023), Hiatal hernia, Hyperlipidemia, unspecified, Hypertension, Lumbar stenosis with neurogenic claudication, Obesity, Other muscle spasm, PONV (postoperative nausea and vomiting), PONV (postoperative nausea and vomiting) (07/29/2024), Presence of right artificial hip joint, Proteinuria, unspecified, Restless  legs syndrome, S/P hysterectomy with oophorectomy (08/20/2024), Tachycardia, Type 2 diabetes mellitus, Uterine mass, Vision loss, and Vitamin D deficiency, unspecified.    Surgical History  Past Surgical History:   Procedure Laterality Date    CHOLECYSTECTOMY  2012    COLONOSCOPY      ENDOMETRIAL ABLATION  1999    HIP ARTHROPLASTY Right 2017    OVARIAN CYST REMOVAL  1999    TONSILLECTOMY      TUBAL LIGATION      WISDOM TOOTH EXTRACTION          Social History  She reports that she has never smoked. She has never been exposed to tobacco smoke. She has never used smokeless tobacco. She reports current alcohol use of about 3.0 standard drinks of alcohol per week. She reports that she does not use drugs.    Family History  Family History   Problem Relation Name Age of Onset    Diabetes Mother      COPD Mother      Thyroid disease Mother          Allergies  Metoprolol, Ace inhibitors, Atorvastatin, Atorvastatin calcium, Gabapentin, Metformin, Pseudoephedrine hcl, Triamterene-hydrochlorothiazid, and Erythromycin    MEDICATIONS:    Current Outpatient Medications:     acetaminophen (Tylenol) 500 mg tablet, Take by mouth., Disp: , Rfl:     amLODIPine (Norvasc) 10 mg tablet, Take 1 Tablet by mouth Daily, Disp: 90 tablet, Rfl: 1    Mounjaro 10 mg/0.5 mL pen injector, Inject 10 mg under the skin every 7 days., Disp: 6 mL, Rfl: 3    olmesartan-hydrochlorothiazide (BENIcar HCT) 40-25 mg tablet, Take 1 Tablet by mouth Daily, Disp: 90 tablet, Rfl: 1    omeprazole OTC (PriLOSEC OTC) 20 mg EC tablet, Take 1 tablet (20 mg) by mouth once daily in the morning. Take before meals. Do not crush, chew, or split., Disp: , Rfl:     OneTouch Delica Plus Lancet 33 gauge [facility], For glucose testing once daily, Disp: 100 each, Rfl: 3    OneTouch Verio Reflect Meter misc, , Disp: , Rfl:     OneTouch Verio test strips strip, For glucose testing once daily, Disp: 100 strip, Rfl: 3    rOPINIRole (Requip) 1 mg tablet, Take 1 tablet (1 mg) by mouth once  daily at bedtime., Disp: 90 tablet, Rfl: 0    tiZANidine (Zanaflex) 2 mg tablet, Take 1 tablet by mouth once daily if needed, Disp: 30 tablet, Rfl: 0    topiramate (Topamax) 25 mg tablet, Take 1 tablet (25 mg) by mouth once daily at bedtime., Disp: 90 tablet, Rfl: 2    Physical Exam  Vitals and nursing note reviewed.   HENT:      Head: Normocephalic.      Nose: Nose normal.   Eyes:      Extraocular Movements: Extraocular movements intact.      Conjunctiva/sclera: Conjunctivae normal.      Pupils: Pupils are equal, round, and reactive to light.   Cardiovascular:      Rate and Rhythm: Normal rate and regular rhythm.   Pulmonary:      Effort: Pulmonary effort is normal.      Breath sounds: Normal breath sounds.   Genitourinary:     Comments: Deferred  Musculoskeletal:         General: Tenderness present. No swelling, deformity or signs of injury.      Cervical back: No rigidity or tenderness.      Right lower leg: No edema.      Left lower leg: No edema.      Comments: Negative leg raise.  Negative for paraspinal tenderness at the lumbar region.  No radicular symptoms.  BUE 5/5, BLE 5/5.   Skin:     General: Skin is warm and dry.   Neurological:      General: No focal deficit present.      Mental Status: She is alert and oriented to person, place, and time.   Psychiatric:         Mood and Affect: Mood normal.         Behavior: Behavior normal.            Pain Management Panel  More data exists         Latest Ref Rng & Units 5/10/2024 12/1/2023   Pain Management Panel   Amphetamine Screen, Urine Presumptive Negative Presumptive Negative  Presumptive Negative    Barbiturate Screen, Urine Presumptive Negative Presumptive Negative  Presumptive Negative    Benzodiazepines Screen, Urine Presumptive Negative - Presumptive Negative    Codeine IgE <50 ng/mL <50  -   Fentanyl Screen, Urine Presumptive Negative - Presumptive Negative    Hydromorphone Urine <25 ng/mL <25  -   Morphine  <50 ng/mL <50  -      Details                       Assessment/Plan   Problem List Items Addressed This Visit             ICD-10-CM    Facet arthropathy, lumbosacral M47.817    Relevant Medications    topiramate (Topamax) 25 mg tablet    Chronic lumbar radiculopathy - Primary M54.16    Relevant Medications    topiramate (Topamax) 25 mg tablet    Lumbar stenosis with neurogenic claudication M48.062    Relevant Medications    topiramate (Topamax) 25 mg tablet     Other Visit Diagnoses         Codes    Medication management     Z79.899    Relevant Orders    Opiate/Opioid/Benzo Prescription Compliance    OOB Internal Tracking                Plan/Follow-up Instructions:    Continue with your prescribed medications.    I sent your topiramate refill to your pharmacy.  Continue taking 25 mg at night.  You may increase to 50 mg at night in a month if tolerated.      Call the office if needing follow-up appointment      Time     Prep time on date of the patient encounter: 2 minutes  Time spent directly with patient/family/caregiver: 25 minutes  Documentation time: 2 minutes  Total time on date of patient encounter: 29 minutes      --------------  Disclaimer: This note was created using voice recognition software. It was not corrected for typographical or grammatical errors, inadvertent word insertion, or any unintended errors. Please feel free to contact me for clarification.  --------------      Jocelyn Quiñones, МАРИНА, APRN, FNP-C   [facility]  Office #: [phone]  Fax # [phone]

## 2024-11-18 DIAGNOSIS — G25.81 RESTLESS LEG SYNDROME: ICD-10-CM

## 2024-11-18 PROCEDURE — RXMED WILLOW AMBULATORY MEDICATION CHARGE

## 2024-11-18 RX ORDER — ROPINIROLE 1 MG/1
1 TABLET, FILM COATED ORAL NIGHTLY
Qty: 90 TABLET | Refills: 0 | Status: SHIPPED | OUTPATIENT
Start: 2024-11-18

## 2024-11-19 PROCEDURE — RXMED WILLOW AMBULATORY MEDICATION CHARGE

## 2024-11-20 ENCOUNTER — PHARMACY VISIT (OUTPATIENT)
Dept: PHARMACY | Facility: CLINIC | Age: 64
End: 2024-11-20
Payer: COMMERCIAL

## 2024-11-20 PROCEDURE — RXMED WILLOW AMBULATORY MEDICATION CHARGE

## 2024-12-02 ENCOUNTER — PHARMACY VISIT (OUTPATIENT)
Dept: PHARMACY | Facility: CLINIC | Age: 64
End: 2024-12-02
Payer: COMMERCIAL

## 2024-12-02 PROCEDURE — RXMED WILLOW AMBULATORY MEDICATION CHARGE

## 2024-12-05 ENCOUNTER — APPOINTMENT (OUTPATIENT)
Dept: PRIMARY CARE | Facility: CLINIC | Age: 64
End: 2024-12-05
Payer: COMMERCIAL

## 2024-12-16 PROCEDURE — RXMED WILLOW AMBULATORY MEDICATION CHARGE

## 2024-12-17 ENCOUNTER — APPOINTMENT (OUTPATIENT)
Dept: PRIMARY CARE | Facility: CLINIC | Age: 64
End: 2024-12-17
Payer: COMMERCIAL

## 2024-12-17 ENCOUNTER — PHARMACY VISIT (OUTPATIENT)
Dept: PHARMACY | Facility: CLINIC | Age: 64
End: 2024-12-17
Payer: COMMERCIAL

## 2024-12-17 VITALS
WEIGHT: 284.8 LBS | DIASTOLIC BLOOD PRESSURE: 68 MMHG | TEMPERATURE: 97.6 F | HEIGHT: 67 IN | BODY MASS INDEX: 44.7 KG/M2 | SYSTOLIC BLOOD PRESSURE: 124 MMHG | OXYGEN SATURATION: 98 % | HEART RATE: 74 BPM

## 2024-12-17 DIAGNOSIS — E78.00 HYPERCHOLESTEREMIA: ICD-10-CM

## 2024-12-17 DIAGNOSIS — E11.69 TYPE 2 DIABETES MELLITUS WITH OTHER SPECIFIED COMPLICATION, WITHOUT LONG-TERM CURRENT USE OF INSULIN: ICD-10-CM

## 2024-12-17 DIAGNOSIS — E55.9 VITAMIN D DEFICIENCY: ICD-10-CM

## 2024-12-17 DIAGNOSIS — B37.2 CANDIDAL INTERTRIGO: ICD-10-CM

## 2024-12-17 DIAGNOSIS — E66.01 CLASS 3 SEVERE OBESITY DUE TO EXCESS CALORIES WITH SERIOUS COMORBIDITY AND BODY MASS INDEX (BMI) OF 40.0 TO 44.9 IN ADULT: ICD-10-CM

## 2024-12-17 DIAGNOSIS — Z12.11 ENCOUNTER FOR SCREENING FOR MALIGNANT NEOPLASM OF COLON: ICD-10-CM

## 2024-12-17 DIAGNOSIS — I25.10 CORONARY ARTERY DISEASE INVOLVING NATIVE CORONARY ARTERY OF NATIVE HEART WITHOUT ANGINA PECTORIS: ICD-10-CM

## 2024-12-17 DIAGNOSIS — I10 HYPERTENSION, UNSPECIFIED TYPE: ICD-10-CM

## 2024-12-17 DIAGNOSIS — Z00.00 ANNUAL PHYSICAL EXAM: Primary | ICD-10-CM

## 2024-12-17 DIAGNOSIS — E66.813 CLASS 3 SEVERE OBESITY DUE TO EXCESS CALORIES WITH SERIOUS COMORBIDITY AND BODY MASS INDEX (BMI) OF 40.0 TO 44.9 IN ADULT: ICD-10-CM

## 2024-12-17 DIAGNOSIS — Z53.20 STATIN MEDICATION DECLINED BY PATIENT: ICD-10-CM

## 2024-12-17 PROCEDURE — 3044F HG A1C LEVEL LT 7.0%: CPT | Performed by: INTERNAL MEDICINE

## 2024-12-17 PROCEDURE — 3074F SYST BP LT 130 MM HG: CPT | Performed by: INTERNAL MEDICINE

## 2024-12-17 PROCEDURE — 1036F TOBACCO NON-USER: CPT | Performed by: INTERNAL MEDICINE

## 2024-12-17 PROCEDURE — 99214 OFFICE O/P EST MOD 30 MIN: CPT | Performed by: INTERNAL MEDICINE

## 2024-12-17 PROCEDURE — 3049F LDL-C 100-129 MG/DL: CPT | Performed by: INTERNAL MEDICINE

## 2024-12-17 PROCEDURE — 3078F DIAST BP <80 MM HG: CPT | Performed by: INTERNAL MEDICINE

## 2024-12-17 PROCEDURE — 3008F BODY MASS INDEX DOCD: CPT | Performed by: INTERNAL MEDICINE

## 2024-12-17 PROCEDURE — 4010F ACE/ARB THERAPY RXD/TAKEN: CPT | Performed by: INTERNAL MEDICINE

## 2024-12-17 PROCEDURE — RXMED WILLOW AMBULATORY MEDICATION CHARGE

## 2024-12-17 PROCEDURE — 3060F POS MICROALBUMINURIA REV: CPT | Performed by: INTERNAL MEDICINE

## 2024-12-17 PROCEDURE — 99396 PREV VISIT EST AGE 40-64: CPT | Performed by: INTERNAL MEDICINE

## 2024-12-17 RX ORDER — NYSTATIN AND TRIAMCINOLONE ACETONIDE 100000; 1 [USP'U]/G; MG/G
CREAM TOPICAL 2 TIMES DAILY
Qty: 60 G | Refills: 1 | Status: SHIPPED | OUTPATIENT
Start: 2024-12-17

## 2024-12-17 ASSESSMENT — PROMIS GLOBAL HEALTH SCALE
RATE_PHYSICAL_HEALTH: GOOD
RATE_AVERAGE_FATIGUE: MODERATE
RATE_MENTAL_HEALTH: VERY GOOD
RATE_AVERAGE_PAIN: 4
RATE_QUALITY_OF_LIFE: GOOD
RATE_GENERAL_HEALTH: GOOD
RATE_SOCIAL_SATISFACTION: VERY GOOD
CARRYOUT_PHYSICAL_ACTIVITIES: COMPLETELY
CARRYOUT_SOCIAL_ACTIVITIES: VERY GOOD
EMOTIONAL_PROBLEMS: NEVER

## 2024-12-17 ASSESSMENT — ANXIETY QUESTIONNAIRES
6. BECOMING EASILY ANNOYED OR IRRITABLE: NOT AT ALL
3. WORRYING TOO MUCH ABOUT DIFFERENT THINGS: NOT AT ALL
5. BEING SO RESTLESS THAT IT IS HARD TO SIT STILL: NOT AT ALL
4. TROUBLE RELAXING: NOT AT ALL
IF YOU CHECKED OFF ANY PROBLEMS ON THIS QUESTIONNAIRE, HOW DIFFICULT HAVE THESE PROBLEMS MADE IT FOR YOU TO DO YOUR WORK, TAKE CARE OF THINGS AT HOME, OR GET ALONG WITH OTHER PEOPLE: NOT DIFFICULT AT ALL
7. FEELING AFRAID AS IF SOMETHING AWFUL MIGHT HAPPEN: NOT AT ALL
2. NOT BEING ABLE TO STOP OR CONTROL WORRYING: NOT AT ALL
1. FEELING NERVOUS, ANXIOUS, OR ON EDGE: NOT AT ALL
GAD7 TOTAL SCORE: 0

## 2024-12-17 ASSESSMENT — PATIENT HEALTH QUESTIONNAIRE - PHQ9
SUM OF ALL RESPONSES TO PHQ9 QUESTIONS 1 AND 2: 0
2. FEELING DOWN, DEPRESSED OR HOPELESS: NOT AT ALL
1. LITTLE INTEREST OR PLEASURE IN DOING THINGS: NOT AT ALL

## 2024-12-17 ASSESSMENT — ENCOUNTER SYMPTOMS: HYPERTENSION: 1

## 2024-12-17 NOTE — PROGRESS NOTES
"Subjective   Patient ID: Lindsey Herrera \"Marcela\" is a 64 y.o. female who presents for Annual Exam, Rash (Under right breast x couple weeks ///Itching ), and Hypertension.  Rash    Hypertension      Yearly physical    Mammogram 6-24  Dexa none  Colonoscopy 17  was to recheck 3-5 yrs / past due  CT chest lung cancer screening n/a  GYN 24  immunizations rev'd pt declined  BMI 44.6    Follow up    Submammary intertrigo  Nystatin triamcinolone bid  Counseled    Hypertension better on therapy no side effects  On benecar  Continue norvasc  follow    uterine mass benign        GYN following     Hiatal hernia   diet discussed   Using otc daily     Hypercholesterolemia patient stopped statin therapy \"felt she didn't need it\"  Cardio will recheck in October / not done  Labs and cardio follow up cancelled by pt / not rescheduled     CAD by elevated CT cardiac score patient on aspirin.    Cardiology following but pt cancelled follow up  Pt aware of risks including death     DM A1c 7.4   3-24   FBS not checked either  On mounjaro  Endo following     Chronic back pain  Pain mgmt following  Injection pending     RLS  on rx no side effects     Dense breast tissue on mammogram patient not interested in MRI of the breast.     Hypercalcemia improved continue to follow     Abnormal AST and ALT improved continue to follow     Diet and exercise reviewed     colonoscopy and dexa     Immunizations rev'd    pt declined  Risks rev'd including death / pt understood    Review of Systems   Skin:  Positive for rash.   All other systems reviewed and are negative.      Objective   /68   Pulse 74   Temp 36.4 °C (97.6 °F)   Ht 1.702 m (5' 7\")   Wt 129 kg (284 lb 12.8 oz)   SpO2 98%   BMI 44.61 kg/m²   Lab Results   Component Value Date    WBC 9.0 07/23/2024    HGB 14.8 07/23/2024    HCT 45.0 07/23/2024     07/23/2024    CHOL 207 (H) 03/09/2024    TRIG 215 (H) 03/09/2024    HDL 39.1 03/09/2024    ALT 28 05/24/2024    AST 26 " 05/24/2024     07/23/2024    K 4.2 07/23/2024     07/23/2024    CREATININE 0.68 07/23/2024    BUN 12 07/23/2024    CO2 23 07/23/2024    TSH 2.71 03/09/2024    HGBA1C 6.8 (A) 10/28/2024           Physical Exam  Vitals reviewed.   Constitutional:       Appearance: Normal appearance. She is obese.   HENT:      Head: Normocephalic and atraumatic.      Mouth/Throat:      Pharynx: No posterior oropharyngeal erythema.   Eyes:      General: No scleral icterus.     Conjunctiva/sclera: Conjunctivae normal.      Pupils: Pupils are equal, round, and reactive to light.   Cardiovascular:      Rate and Rhythm: Normal rate and regular rhythm.      Heart sounds: Normal heart sounds.   Pulmonary:      Effort: No respiratory distress.      Breath sounds: No wheezing.   Abdominal:      General: Abdomen is flat. Bowel sounds are normal. There is no distension.      Palpations: Abdomen is soft. There is no mass.      Tenderness: There is no abdominal tenderness. There is no rebound.   Musculoskeletal:         General: Normal range of motion.      Cervical back: Normal range of motion and neck supple.   Skin:     General: Skin is warm and dry.      Findings: Erythema present.      Comments: Rt submammary candida   Neurological:      General: No focal deficit present.      Mental Status: She is alert and oriented to person, place, and time. Mental status is at baseline.   Psychiatric:         Mood and Affect: Mood normal.         Behavior: Behavior normal.         Thought Content: Thought content normal.         Judgment: Judgment normal.         Problem List Items Addressed This Visit             ICD-10-CM    DM (diabetes mellitus), type 2 (Multi) E11.9    Vitamin D deficiency E55.9    Relevant Orders    Vitamin D 25-Hydroxy,Total (for eval of Vitamin D levels)     Other Visit Diagnoses         Codes    Annual physical exam    -  Primary Z00.00    Relevant Orders    CBC and Auto Differential    Comprehensive Metabolic Panel     "Lipid Panel    TSH with reflex to Free T4 if abnormal    Coronary artery disease involving native coronary artery of native heart without angina pectoris     I25.10    Hypercholesteremia     E78.00    Hypertension, unspecified type     I10    Statin medication declined by patient     Z53.20    Candidal intertrigo     B37.2    Relevant Medications    nystatin-triamcinolone (Mycolog II) cream    Encounter for screening for malignant neoplasm of colon     Z12.11    Relevant Orders    Colonoscopy Screening; Average Risk Patient    Class 3 severe obesity due to excess calories with serious comorbidity and body mass index (BMI) of 40.0 to 44.9 in adult     E66.813, E66.01, Z68.41          Assessment/Plan     Yearly physical    Mammogram 6-24  Dexa none / pt declined  Colonoscopy 17  was to recheck 3-5 yrs / past due  CT chest lung cancer screening n/a  GYN 24  immunizations rev'd pt declined  BMI 44.6    Follow up    Submammary intertrigo  Nystatin triamcinolone bid  Counseled    Hypertension better on therapy no side effects  On benecar  Continue norvasc  follow    uterine mass benign        GYN following     Hiatal hernia   diet discussed   Using otc daily     Hypercholesterolemia patient stopped statin therapy \"felt she didn't need it\"  Cardio will recheck in October / not done  Labs and cardio follow up cancelled by pt / not rescheduled     CAD by elevated CT cardiac score patient on aspirin.    Cardiology following but pt cancelled follow up  Pt aware of risks including death     DM A1c 7.4   3-24   FBS not checked either  On mounjaro  Endo following     Chronic back pain  Pain mgmt following  Injection pending     RLS  on rx no side effects     Dense breast tissue on mammogram patient not interested in MRI of the breast.     Hypercalcemia improved continue to follow     Abnormal AST and ALT improved continue to follow     Diet and exercise reviewed     Immunizations rev'd    pt declined  Risks rev'd including death " / pt understood    Check labs and colonoscopy    Follow up 3 months

## 2024-12-18 ENCOUNTER — PHARMACY VISIT (OUTPATIENT)
Dept: PHARMACY | Facility: CLINIC | Age: 64
End: 2024-12-18
Payer: COMMERCIAL

## 2024-12-18 PROCEDURE — RXMED WILLOW AMBULATORY MEDICATION CHARGE

## 2024-12-31 ENCOUNTER — PHARMACY VISIT (OUTPATIENT)
Dept: PHARMACY | Facility: CLINIC | Age: 64
End: 2024-12-31
Payer: COMMERCIAL

## 2024-12-31 PROCEDURE — RXMED WILLOW AMBULATORY MEDICATION CHARGE

## 2025-01-21 PROCEDURE — RXMED WILLOW AMBULATORY MEDICATION CHARGE

## 2025-01-22 PROCEDURE — RXMED WILLOW AMBULATORY MEDICATION CHARGE

## 2025-01-23 ENCOUNTER — PHARMACY VISIT (OUTPATIENT)
Dept: PHARMACY | Facility: CLINIC | Age: 65
End: 2025-01-23
Payer: COMMERCIAL

## 2025-01-23 PROCEDURE — RXMED WILLOW AMBULATORY MEDICATION CHARGE

## 2025-02-03 ENCOUNTER — APPOINTMENT (OUTPATIENT)
Dept: ENDOCRINOLOGY | Facility: CLINIC | Age: 65
End: 2025-02-03
Payer: COMMERCIAL

## 2025-02-03 ENCOUNTER — TELEPHONE (OUTPATIENT)
Dept: ENDOCRINOLOGY | Facility: CLINIC | Age: 65
End: 2025-02-03

## 2025-02-03 NOTE — TELEPHONE ENCOUNTER
Pt called us and left a VM asking us if we had any sooner appointments other then 6/2/25, as their appointment on 2/3/25 they had to cancel. Pt was called back and informed that we sadly do not have anything sooner, as we are booking out that far at this time. Pt understood and thanked us for the call.

## 2025-02-17 DIAGNOSIS — G25.81 RESTLESS LEG SYNDROME: ICD-10-CM

## 2025-02-17 PROCEDURE — RXMED WILLOW AMBULATORY MEDICATION CHARGE

## 2025-02-17 RX ORDER — ROPINIROLE 1 MG/1
1 TABLET, FILM COATED ORAL NIGHTLY
Qty: 90 TABLET | Refills: 0 | Status: SHIPPED | OUTPATIENT
Start: 2025-02-17

## 2025-02-18 ENCOUNTER — TELEPHONE (OUTPATIENT)
Dept: PRIMARY CARE | Facility: CLINIC | Age: 65
End: 2025-02-18
Payer: COMMERCIAL

## 2025-02-18 PROCEDURE — RXMED WILLOW AMBULATORY MEDICATION CHARGE

## 2025-02-18 NOTE — TELEPHONE ENCOUNTER
Spoke with patient, explained the need to follow up with cardio per Dr. Gonzalez, patient will follow

## 2025-02-24 ENCOUNTER — PHARMACY VISIT (OUTPATIENT)
Dept: PHARMACY | Facility: CLINIC | Age: 65
End: 2025-02-24
Payer: COMMERCIAL

## 2025-02-24 PROCEDURE — RXMED WILLOW AMBULATORY MEDICATION CHARGE

## 2025-03-17 ENCOUNTER — APPOINTMENT (OUTPATIENT)
Dept: PRIMARY CARE | Facility: CLINIC | Age: 65
End: 2025-03-17
Payer: COMMERCIAL

## 2025-03-24 PROCEDURE — RXMED WILLOW AMBULATORY MEDICATION CHARGE

## 2025-03-25 LAB
25(OH)D3+25(OH)D2 SERPL-MCNC: 23 NG/ML (ref 30–100)
ALBUMIN SERPL-MCNC: 4.2 G/DL (ref 3.6–5.1)
ALP SERPL-CCNC: 74 U/L (ref 37–153)
ALT SERPL-CCNC: 13 U/L (ref 6–29)
ANION GAP SERPL CALCULATED.4IONS-SCNC: 10 MMOL/L (CALC) (ref 7–17)
AST SERPL-CCNC: 15 U/L (ref 10–35)
BASOPHILS # BLD AUTO: 44 CELLS/UL (ref 0–200)
BASOPHILS NFR BLD AUTO: 0.5 %
BILIRUB SERPL-MCNC: 0.3 MG/DL (ref 0.2–1.2)
BUN SERPL-MCNC: 15 MG/DL (ref 7–25)
CALCIUM SERPL-MCNC: 10.3 MG/DL (ref 8.6–10.4)
CHLORIDE SERPL-SCNC: 107 MMOL/L (ref 98–110)
CHOLEST SERPL-MCNC: 207 MG/DL
CHOLEST/HDLC SERPL: 5.3 (CALC)
CO2 SERPL-SCNC: 23 MMOL/L (ref 20–32)
CREAT SERPL-MCNC: 0.82 MG/DL (ref 0.5–1.05)
EGFRCR SERPLBLD CKD-EPI 2021: 81 ML/MIN/1.73M2
EOSINOPHIL # BLD AUTO: 70 CELLS/UL (ref 15–500)
EOSINOPHIL NFR BLD AUTO: 0.8 %
ERYTHROCYTE [DISTWIDTH] IN BLOOD BY AUTOMATED COUNT: 13 % (ref 11–15)
GLUCOSE SERPL-MCNC: 107 MG/DL (ref 65–99)
HCT VFR BLD AUTO: 46.7 % (ref 35–45)
HDLC SERPL-MCNC: 39 MG/DL
HGB BLD-MCNC: 15.1 G/DL (ref 11.7–15.5)
LDLC SERPL CALC-MCNC: 131 MG/DL (CALC)
LYMPHOCYTES # BLD AUTO: 1688 CELLS/UL (ref 850–3900)
LYMPHOCYTES NFR BLD AUTO: 19.4 %
MCH RBC QN AUTO: 30 PG (ref 27–33)
MCHC RBC AUTO-ENTMCNC: 32.3 G/DL (ref 32–36)
MCV RBC AUTO: 92.7 FL (ref 80–100)
MONOCYTES # BLD AUTO: 452 CELLS/UL (ref 200–950)
MONOCYTES NFR BLD AUTO: 5.2 %
NEUTROPHILS # BLD AUTO: 6447 CELLS/UL (ref 1500–7800)
NEUTROPHILS NFR BLD AUTO: 74.1 %
NONHDLC SERPL-MCNC: 168 MG/DL (CALC)
PLATELET # BLD AUTO: 328 THOUSAND/UL (ref 140–400)
PMV BLD REES-ECKER: 9.8 FL (ref 7.5–12.5)
POTASSIUM SERPL-SCNC: 4.2 MMOL/L (ref 3.5–5.3)
PROT SERPL-MCNC: 7 G/DL (ref 6.1–8.1)
RBC # BLD AUTO: 5.04 MILLION/UL (ref 3.8–5.1)
SODIUM SERPL-SCNC: 140 MMOL/L (ref 135–146)
TRIGL SERPL-MCNC: 227 MG/DL
TSH SERPL-ACNC: 3.38 MIU/L (ref 0.4–4.5)
WBC # BLD AUTO: 8.7 THOUSAND/UL (ref 3.8–10.8)

## 2025-03-27 ENCOUNTER — PHARMACY VISIT (OUTPATIENT)
Dept: PHARMACY | Facility: CLINIC | Age: 65
End: 2025-03-27
Payer: COMMERCIAL

## 2025-04-08 ENCOUNTER — APPOINTMENT (OUTPATIENT)
Dept: PRIMARY CARE | Facility: CLINIC | Age: 65
End: 2025-04-08
Payer: COMMERCIAL

## 2025-04-08 VITALS
OXYGEN SATURATION: 95 % | HEART RATE: 85 BPM | WEIGHT: 280.8 LBS | SYSTOLIC BLOOD PRESSURE: 132 MMHG | DIASTOLIC BLOOD PRESSURE: 78 MMHG | TEMPERATURE: 97.5 F | BODY MASS INDEX: 43.98 KG/M2

## 2025-04-08 DIAGNOSIS — E11.69 TYPE 2 DIABETES MELLITUS WITH OTHER SPECIFIED COMPLICATION, WITHOUT LONG-TERM CURRENT USE OF INSULIN: ICD-10-CM

## 2025-04-08 DIAGNOSIS — E55.9 VITAMIN D DEFICIENCY: ICD-10-CM

## 2025-04-08 DIAGNOSIS — I10 HYPERTENSION, UNSPECIFIED TYPE: ICD-10-CM

## 2025-04-08 DIAGNOSIS — E66.01 CLASS 3 SEVERE OBESITY DUE TO EXCESS CALORIES WITH SERIOUS COMORBIDITY AND BODY MASS INDEX (BMI) OF 40.0 TO 44.9 IN ADULT: ICD-10-CM

## 2025-04-08 DIAGNOSIS — R93.1 ABNORMAL HEART SCORE CT: ICD-10-CM

## 2025-04-08 DIAGNOSIS — Z53.20 STATIN MEDICATION DECLINED BY PATIENT: ICD-10-CM

## 2025-04-08 DIAGNOSIS — E78.00 HYPERCHOLESTEREMIA: ICD-10-CM

## 2025-04-08 DIAGNOSIS — Z71.2 ENCOUNTER TO DISCUSS TEST RESULTS: Primary | ICD-10-CM

## 2025-04-08 DIAGNOSIS — E66.813 CLASS 3 SEVERE OBESITY DUE TO EXCESS CALORIES WITH SERIOUS COMORBIDITY AND BODY MASS INDEX (BMI) OF 40.0 TO 44.9 IN ADULT: ICD-10-CM

## 2025-04-08 PROCEDURE — 1036F TOBACCO NON-USER: CPT | Performed by: INTERNAL MEDICINE

## 2025-04-08 PROCEDURE — 4010F ACE/ARB THERAPY RXD/TAKEN: CPT | Performed by: INTERNAL MEDICINE

## 2025-04-08 PROCEDURE — 3078F DIAST BP <80 MM HG: CPT | Performed by: INTERNAL MEDICINE

## 2025-04-08 PROCEDURE — 3075F SYST BP GE 130 - 139MM HG: CPT | Performed by: INTERNAL MEDICINE

## 2025-04-08 PROCEDURE — 99214 OFFICE O/P EST MOD 30 MIN: CPT | Performed by: INTERNAL MEDICINE

## 2025-04-08 ASSESSMENT — ENCOUNTER SYMPTOMS
SHORTNESS OF BREATH: 0
NECK PAIN: 0
HEADACHES: 0
PALPITATIONS: 0
ORTHOPNEA: 0
SWEATS: 0
HYPERTENSION: 1
PND: 0
BLURRED VISION: 0

## 2025-04-08 NOTE — PROGRESS NOTES
"Subjective   Patient ID: Lindsey Herrera \"Marcela\" is a 64 y.o. female who presents for 3 Month Medical Management and Hypertension.  Hypertension  This is a chronic problem. The current episode started more than 1 year ago. The problem has been gradually improving since onset. The problem is controlled. Pertinent negatives include no anxiety, blurred vision, chest pain, headaches, malaise/fatigue, neck pain, orthopnea, palpitations, peripheral edema, PND, shortness of breath or sweats. Agents associated with hypertension include NSAIDs. There are no known risk factors for coronary artery disease. Compliance problems include exercise.        Routine follow up    Labs rev'd    Feels well     Submammary intertrigo  Nystatin triamcinolone bid  Counseled     Hypertension better on therapy no side effects  On benecar  Continue norvasc  follow     uterine mass benign        GYN following     Hiatal hernia   diet discussed   Using otc daily     Hypercholesterolemia patient stopped statin therapy \"felt she didn't need it\"  Cardio following     CAD by elevated CT cardiac score patient on aspirin.    Cardiology following  Pt aware of risks including death     DM A1c 7.4   3-24   FBS not checked either  On mounjaro  Endo following    Vit D deficiency on MVI daily     Chronic back pain  Pain mgmt following  Injection pending     RLS  on rx no side effects     Dense breast tissue on mammogram patient not interested in MRI of the breast.     Hypercalcemia resolved continue to follow     Abnormal AST and ALT resolved     Diet and exercise reviewed     Immunizations rev'd    pt declined  Risks rev'd including death / pt understood    Review of Systems   Constitutional:  Negative for malaise/fatigue.   Eyes:  Negative for blurred vision.   Respiratory:  Negative for shortness of breath.    Cardiovascular:  Negative for chest pain, palpitations, orthopnea and PND.   Musculoskeletal:  Negative for neck pain.   Neurological:  Negative for " headaches.   All other systems reviewed and are negative.      Objective   /78   Pulse 85   Temp 36.4 °C (97.5 °F)   Wt 127 kg (280 lb 12.8 oz)   SpO2 95%   BMI 43.98 kg/m²   Lab Results   Component Value Date    WBC 8.7 03/24/2025    HGB 15.1 03/24/2025    HCT 46.7 (H) 03/24/2025     03/24/2025    CHOL 207 (H) 03/24/2025    TRIG 227 (H) 03/24/2025    HDL 39 (L) 03/24/2025    ALT 13 03/24/2025    AST 15 03/24/2025     03/24/2025    K 4.2 03/24/2025     03/24/2025    CREATININE 0.82 03/24/2025    BUN 15 03/24/2025    CO2 23 03/24/2025    TSH 3.38 03/24/2025    HGBA1C 6.8 (A) 10/28/2024           Physical Exam  Vitals reviewed.   Constitutional:       Appearance: Normal appearance. She is obese.   HENT:      Head: Normocephalic and atraumatic.      Mouth/Throat:      Pharynx: No posterior oropharyngeal erythema.   Eyes:      General: No scleral icterus.     Conjunctiva/sclera: Conjunctivae normal.      Pupils: Pupils are equal, round, and reactive to light.   Cardiovascular:      Rate and Rhythm: Normal rate and regular rhythm.      Heart sounds: Normal heart sounds.   Pulmonary:      Effort: No respiratory distress.      Breath sounds: No wheezing.   Abdominal:      General: Abdomen is flat. Bowel sounds are normal. There is no distension.      Palpations: Abdomen is soft. There is no mass.      Tenderness: There is no abdominal tenderness. There is no rebound.   Musculoskeletal:         General: Normal range of motion.      Cervical back: Normal range of motion and neck supple.   Skin:     General: Skin is warm and dry.   Neurological:      General: No focal deficit present.      Mental Status: She is alert and oriented to person, place, and time. Mental status is at baseline.   Psychiatric:         Mood and Affect: Mood normal.         Behavior: Behavior normal.         Thought Content: Thought content normal.         Judgment: Judgment normal.         Problem List Items Addressed This  "Visit             ICD-10-CM    DM (diabetes mellitus), type 2 (Multi) E11.9    Vitamin D deficiency E55.9     Other Visit Diagnoses         Codes    Encounter to discuss test results    -  Primary Z71.2    Hypertension, unspecified type     I10    Hypercholesteremia     E78.00    Statin medication declined by patient     Z53.20    Abnormal Heart Score CT     R93.1    Class 3 severe obesity due to excess calories with serious comorbidity and body mass index (BMI) of 40.0 to 44.9 in adult     E66.813, E66.01, Z68.41          Assessment/Plan       Labs rev'd    Feels well     Submammary intertrigo  Nystatin triamcinolone bid  Counseled     Hypertension better on therapy no side effects  On benecar  Continue norvasc  follow     uterine mass benign        GYN following     Hiatal hernia   diet discussed   Using otc daily     Hypercholesterolemia patient stopped statin therapy \"felt she didn't need it\"  Cardio following     CAD by elevated CT cardiac score patient on aspirin.    Cardiology following  Pt aware of risks including death     DM A1c 6.8   10-24  FBS not checked either  On mounjaro  Endo following    Vit D deficiency on MVI daily     Chronic back pain  Pain mgmt following  Injection pending     RLS  on rx no side effects     Dense breast tissue on mammogram patient not interested in MRI of the breast.     Hypercalcemia resolved continue to follow     Abnormal AST and ALT resolved     Diet and exercise reviewed    Mammogram 6-24 / pt declined  Dexa none / pt declined  Colonoscopy 17  was to recheck 3-5 yrs / past due / pt declined       aware risk of colon cancer and death  CT chest lung cancer screening n/a  GYN 24  immunizations rev'd pt declined  BMI 43.9     Follow up 6 months / per pt request  "

## 2025-04-28 PROCEDURE — RXMED WILLOW AMBULATORY MEDICATION CHARGE

## 2025-04-29 ENCOUNTER — PHARMACY VISIT (OUTPATIENT)
Dept: PHARMACY | Facility: CLINIC | Age: 65
End: 2025-04-29
Payer: COMMERCIAL

## 2025-05-27 DIAGNOSIS — G25.81 RESTLESS LEG SYNDROME: ICD-10-CM

## 2025-05-27 PROCEDURE — RXMED WILLOW AMBULATORY MEDICATION CHARGE

## 2025-05-27 RX ORDER — ROPINIROLE 1 MG/1
1 TABLET, FILM COATED ORAL NIGHTLY
Qty: 90 TABLET | Refills: 0 | Status: SHIPPED | OUTPATIENT
Start: 2025-05-27

## 2025-05-28 PROCEDURE — RXMED WILLOW AMBULATORY MEDICATION CHARGE

## 2025-05-29 ENCOUNTER — PHARMACY VISIT (OUTPATIENT)
Dept: PHARMACY | Facility: CLINIC | Age: 65
End: 2025-05-29
Payer: COMMERCIAL

## 2025-06-02 ENCOUNTER — APPOINTMENT (OUTPATIENT)
Dept: ENDOCRINOLOGY | Facility: CLINIC | Age: 65
End: 2025-06-02
Payer: COMMERCIAL

## 2025-06-02 VITALS
WEIGHT: 284 LBS | BODY MASS INDEX: 44.48 KG/M2 | DIASTOLIC BLOOD PRESSURE: 84 MMHG | HEART RATE: 79 BPM | SYSTOLIC BLOOD PRESSURE: 128 MMHG

## 2025-06-02 DIAGNOSIS — E11.9 TYPE 2 DIABETES MELLITUS WITHOUT COMPLICATION, WITHOUT LONG-TERM CURRENT USE OF INSULIN: ICD-10-CM

## 2025-06-02 LAB — POC HEMOGLOBIN A1C: 5.9 % (ref 4.2–6.5)

## 2025-06-02 PROCEDURE — RXMED WILLOW AMBULATORY MEDICATION CHARGE

## 2025-06-02 PROCEDURE — 3079F DIAST BP 80-89 MM HG: CPT | Performed by: INTERNAL MEDICINE

## 2025-06-02 PROCEDURE — 1036F TOBACCO NON-USER: CPT | Performed by: INTERNAL MEDICINE

## 2025-06-02 PROCEDURE — 99214 OFFICE O/P EST MOD 30 MIN: CPT | Performed by: INTERNAL MEDICINE

## 2025-06-02 PROCEDURE — 3044F HG A1C LEVEL LT 7.0%: CPT | Performed by: INTERNAL MEDICINE

## 2025-06-02 PROCEDURE — 4010F ACE/ARB THERAPY RXD/TAKEN: CPT | Performed by: INTERNAL MEDICINE

## 2025-06-02 PROCEDURE — 83036 HEMOGLOBIN GLYCOSYLATED A1C: CPT | Performed by: INTERNAL MEDICINE

## 2025-06-02 PROCEDURE — 3074F SYST BP LT 130 MM HG: CPT | Performed by: INTERNAL MEDICINE

## 2025-06-02 RX ORDER — TIRZEPATIDE 5 MG/.5ML
5 INJECTION, SOLUTION SUBCUTANEOUS
Qty: 6 ML | Refills: 3 | Status: SHIPPED | OUTPATIENT
Start: 2025-06-02

## 2025-06-02 ASSESSMENT — ENCOUNTER SYMPTOMS
FREQUENCY: 0
SORE THROAT: 0
CONSTIPATION: 0
HEADACHES: 0
APPETITE CHANGE: 0
COUGH: 0
FEVER: 0
ABDOMINAL PAIN: 0
NERVOUS/ANXIOUS: 0
NAUSEA: 0
VOMITING: 0
POLYDIPSIA: 0
DIARRHEA: 0
SHORTNESS OF BREATH: 0
ARTHRALGIAS: 1

## 2025-06-02 NOTE — PROGRESS NOTES
"History Of Present Illness  Lindsey Herrera \"Marcela\" is a 64 y.o. female     Duration of type 2 diabetes mellitus:  9 years  Complications:  Albuminuria    More heartburn and chills on Mounjaro at 10 mg/week      No weight loss     Patient is testing glucose less than daily  No records provided     Last eye exam:  June 2024    Past Medical History  She has a past medical history of Acute UTI (07/07/2023), Adnexal mass, Cellulitis of toe of left foot (04/10/2023), Chronic back pain, Chronic lumbar radiculopathy, Gastroesophageal reflux disease (11/20/2023), GERD (gastroesophageal reflux disease), GERD (gastroesophageal reflux disease) (04/10/2023), Hiatal hernia, Hyperlipidemia, unspecified, Hypertension, Lumbar stenosis with neurogenic claudication, Obesity, Other muscle spasm, PONV (postoperative nausea and vomiting), PONV (postoperative nausea and vomiting) (07/29/2024), Presence of right artificial hip joint, Proteinuria, unspecified, Restless legs syndrome, S/P hysterectomy with oophorectomy (08/20/2024), Tachycardia, Type 2 diabetes mellitus, Uterine mass, Vision loss, and Vitamin D deficiency, unspecified.    Surgical History  She has a past surgical history that includes Cholecystectomy (2012); Tubal ligation; Hip Arthroplasty (Right, 2017); Tonsillectomy; Colonoscopy; Endometrial ablation (1999); Ovarian cyst removal (1999); and [place] tooth extraction.     Social History  She reports that she has never smoked. She has never been exposed to tobacco smoke. She has never used smokeless tobacco. She reports current alcohol use of about 3.0 standard drinks of alcohol per week. She reports that she does not use drugs.    Family History  Family History[1]    Medications  Current Outpatient Medications   Medication Instructions    acetaminophen (Tylenol) 500 mg tablet Take by mouth.    amLODIPine (Norvasc) 10 mg tablet Take 1 Tablet by mouth Daily    Mounjaro 10 mg, subcutaneous, Every 7 days    olmesartan (BENIcar) " 40 mg tablet Take 1 Tablet by mouth once Daily    omeprazole OTC (PRILOSEC OTC) 20 mg, Daily before breakfast    OneTouch Delica Plus Lancet 33 gauge misc For glucose testing once daily    OneTouch Verio Reflect Meter misc     OneTouch Verio test strips strip For glucose testing once daily    rOPINIRole (REQUIP) 1 mg, oral, Nightly    tiZANidine (Zanaflex) 2 mg tablet Take 1 tablet by mouth once daily if needed    topiramate (TOPAMAX) 25 mg, oral, Nightly       Allergies  Metoprolol, Ace inhibitors, Atorvastatin, Atorvastatin calcium, Gabapentin, Metformin, Pseudoephedrine hcl, Triamterene-hydrochlorothiazid, and Erythromycin    Review of Systems   Constitutional:  Negative for appetite change and fever.   HENT:  Negative for sore throat.         Denies dry mouth   Eyes:  Negative for visual disturbance.   Respiratory:  Negative for cough and shortness of breath.    Cardiovascular:  Negative for chest pain.   Gastrointestinal:  Negative for abdominal pain, constipation, diarrhea, nausea and vomiting.   Endocrine: Negative for polydipsia and polyuria.   Genitourinary:  Negative for frequency.   Musculoskeletal:  Positive for arthralgias.   Skin:  Negative for rash.   Neurological:  Negative for headaches.   Psychiatric/Behavioral:  The patient is not nervous/anxious.          Last Recorded Vitals  Blood pressure 128/84, pulse 79, weight 129 kg (284 lb).    Physical Exam  Constitutional:       General: She is not in acute distress.     Appearance: She is obese.   HENT:      Head: Normocephalic.      Mouth/Throat:      Mouth: Mucous membranes are moist.   Eyes:      Extraocular Movements: Extraocular movements intact.   Neck:      Thyroid: No thyroid mass or thyromegaly.   Cardiovascular:      Pulses:           Radial pulses are 2+ on the right side and 2+ on the left side.   Musculoskeletal:      Right lower leg: No edema.      Left lower leg: No edema.   Lymphadenopathy:      Cervical: No cervical adenopathy.    Neurological:      Mental Status: She is alert.      Motor: No tremor.   Psychiatric:         Mood and Affect: Affect normal.          Relevant Results  GLUCOSE (mg/dL)   Date Value   03/24/2025 107 (H)     Glucose (mg/dL)   Date Value   07/23/2024 140 (H)   05/24/2024 135 (H)   03/09/2024 144 (H)     POC HEMOGLOBIN A1c (%)   Date Value   10/28/2024 6.8 (A)   07/15/2024 6.7 (A)     Hemoglobin A1C (%)   Date Value   07/23/2024 6.7 (H)   03/09/2024 7.4 (H)   07/08/2023 7.6 (A)   03/01/2023 7.9 (A)   09/03/2022 7.8 (A)     CARBON DIOXIDE (mmol/L)   Date Value   03/24/2025 23     Bicarbonate (mmol/L)   Date Value   07/23/2024 23   05/24/2024 24   03/09/2024 23     UREA NITROGEN (BUN) (mg/dL)   Date Value   03/24/2025 15     Urea Nitrogen (mg/dL)   Date Value   07/23/2024 12   05/24/2024 14   03/09/2024 16     Creatinine (mg/dL)   Date Value   07/23/2024 0.68   05/24/2024 0.75   03/09/2024 0.56     CREATININE (mg/dL)   Date Value   03/24/2025 0.82     Lab Results   Component Value Date    CHOL 207 (H) 03/24/2025    CHOL 207 (H) 03/09/2024    CHOL 233 (H) 09/03/2022     Lab Results   Component Value Date    HDL 39 (L) 03/24/2025    HDL 39.1 03/09/2024    HDL 49.0 09/03/2022     Lab Results   Component Value Date    LDLCALC 131 (H) 03/24/2025    LDLCALC 125 (H) 03/09/2024    LDLCALC 130 09/06/2018     Lab Results   Component Value Date    TRIG 227 (H) 03/24/2025    TRIG 215 (H) 03/09/2024    TRIG 231 (H) 09/03/2022     Lab Results   Component Value Date    TSH 3.38 03/24/2025       IMPRESSION  TYPE 2 DIABETES MELLITUS  Rapid A1c 5.9%  Excellent glucose control  No weight loss  GI intolerance to Mounjaro at current dose     RECOMMENDATIONS  Decrease Mounjaro to 5 mg every 7 days     Follow up 6 months         [1]   Family History  Problem Relation Name Age of Onset    Diabetes Mother      COPD Mother      Thyroid disease Mother

## 2025-06-02 NOTE — LETTER
"June 2, 2025     Rachell Gonzalez MD  [address] [address]    Patient: Marcela Herrera   YOB: 1960   Date of Visit: 6/2/2025       Dear Dr. Rachell Gonzalez MD:    Thank you for referring Marcela Herrera to me for evaluation. Below are my notes for this consultation.  If you have questions, please do not hesitate to call me. I look forward to following your patient along with you.       Sincerely,     Abrahan Haro MD      CC: No Recipients  ______________________________________________________________________________________    History Of Present Illness  Lindsey Herrera \"Marcela\" is a 64 y.o. female     Duration of type 2 diabetes mellitus:  9 years  Complications:  Albuminuria    More heartburn and chills on Mounjaro at 10 mg/week      No weight loss     Patient is testing glucose less than daily  No records provided     Last eye exam:  June 2024    Past Medical History  She has a past medical history of Acute UTI (07/07/2023), Adnexal mass, Cellulitis of toe of left foot (04/10/2023), Chronic back pain, Chronic lumbar radiculopathy, Gastroesophageal reflux disease (11/20/2023), GERD (gastroesophageal reflux disease), GERD (gastroesophageal reflux disease) (04/10/2023), Hiatal hernia, Hyperlipidemia, unspecified, Hypertension, Lumbar stenosis with neurogenic claudication, Obesity, Other muscle spasm, PONV (postoperative nausea and vomiting), PONV (postoperative nausea and vomiting) (07/29/2024), Presence of right artificial hip joint, Proteinuria, unspecified, Restless legs syndrome, S/P hysterectomy with oophorectomy (08/20/2024), Tachycardia, Type 2 diabetes mellitus, Uterine mass, Vision loss, and Vitamin D deficiency, unspecified.    Surgical History  She has a past surgical history that includes Cholecystectomy (2012); Tubal ligation; Hip Arthroplasty (Right, 2017); Tonsillectomy; Colonoscopy; Endometrial ablation (1999); Ovarian cyst removal (1999); and [place] tooth " extraction.     Social History  She reports that she has never smoked. She has never been exposed to tobacco smoke. She has never used smokeless tobacco. She reports current alcohol use of about 3.0 standard drinks of alcohol per week. She reports that she does not use drugs.    Family History  Family History[1]    Medications  Current Outpatient Medications   Medication Instructions   • acetaminophen (Tylenol) 500 mg tablet Take by mouth.   • amLODIPine (Norvasc) 10 mg tablet Take 1 Tablet by mouth Daily   • Mounjaro 10 mg, subcutaneous, Every 7 days   • olmesartan (BENIcar) 40 mg tablet Take 1 Tablet by mouth once Daily   • omeprazole OTC (PRILOSEC OTC) 20 mg, Daily before breakfast   • OneTouch Delica Plus Lancet 33 gauge misc For glucose testing once daily   • OneTouch Verio Reflect Meter misc    • OneTouch Verio test strips strip For glucose testing once daily   • rOPINIRole (REQUIP) 1 mg, oral, Nightly   • tiZANidine (Zanaflex) 2 mg tablet Take 1 tablet by mouth once daily if needed   • topiramate (TOPAMAX) 25 mg, oral, Nightly       Allergies  Metoprolol, Ace inhibitors, Atorvastatin, Atorvastatin calcium, Gabapentin, Metformin, Pseudoephedrine hcl, Triamterene-hydrochlorothiazid, and Erythromycin    Review of Systems   Constitutional:  Negative for appetite change and fever.   HENT:  Negative for sore throat.         Denies dry mouth   Eyes:  Negative for visual disturbance.   Respiratory:  Negative for cough and shortness of breath.    Cardiovascular:  Negative for chest pain.   Gastrointestinal:  Negative for abdominal pain, constipation, diarrhea, nausea and vomiting.   Endocrine: Negative for polydipsia and polyuria.   Genitourinary:  Negative for frequency.   Musculoskeletal:  Positive for arthralgias.   Skin:  Negative for rash.   Neurological:  Negative for headaches.   Psychiatric/Behavioral:  The patient is not nervous/anxious.          Last Recorded Vitals  Blood pressure 128/84, pulse 79, weight  129 kg (284 lb).    Physical Exam  Constitutional:       General: She is not in acute distress.     Appearance: She is obese.   HENT:      Head: Normocephalic.      Mouth/Throat:      Mouth: Mucous membranes are moist.   Eyes:      Extraocular Movements: Extraocular movements intact.   Neck:      Thyroid: No thyroid mass or thyromegaly.   Cardiovascular:      Pulses:           Radial pulses are 2+ on the right side and 2+ on the left side.   Musculoskeletal:      Right lower leg: No edema.      Left lower leg: No edema.   Lymphadenopathy:      Cervical: No cervical adenopathy.   Neurological:      Mental Status: She is alert.      Motor: No tremor.   Psychiatric:         Mood and Affect: Affect normal.          Relevant Results  GLUCOSE (mg/dL)   Date Value   03/24/2025 107 (H)     Glucose (mg/dL)   Date Value   07/23/2024 140 (H)   05/24/2024 135 (H)   03/09/2024 144 (H)     POC HEMOGLOBIN A1c (%)   Date Value   10/28/2024 6.8 (A)   07/15/2024 6.7 (A)     Hemoglobin A1C (%)   Date Value   07/23/2024 6.7 (H)   03/09/2024 7.4 (H)   07/08/2023 7.6 (A)   03/01/2023 7.9 (A)   09/03/2022 7.8 (A)     CARBON DIOXIDE (mmol/L)   Date Value   03/24/2025 23     Bicarbonate (mmol/L)   Date Value   07/23/2024 23   05/24/2024 24   03/09/2024 23     UREA NITROGEN (BUN) (mg/dL)   Date Value   03/24/2025 15     Urea Nitrogen (mg/dL)   Date Value   07/23/2024 12   05/24/2024 14   03/09/2024 16     Creatinine (mg/dL)   Date Value   07/23/2024 0.68   05/24/2024 0.75   03/09/2024 0.56     CREATININE (mg/dL)   Date Value   03/24/2025 0.82     Lab Results   Component Value Date    CHOL 207 (H) 03/24/2025    CHOL 207 (H) 03/09/2024    CHOL 233 (H) 09/03/2022     Lab Results   Component Value Date    HDL 39 (L) 03/24/2025    HDL 39.1 03/09/2024    HDL 49.0 09/03/2022     Lab Results   Component Value Date    LDLCALC 131 (H) 03/24/2025    LDLCALC 125 (H) 03/09/2024    LDLCALC 130 09/06/2018     Lab Results   Component Value Date    TRIG 227  (H) 03/24/2025    TRIG 215 (H) 03/09/2024    TRIG 231 (H) 09/03/2022     Lab Results   Component Value Date    TSH 3.38 03/24/2025       IMPRESSION  TYPE 2 DIABETES MELLITUS  Rapid A1c 5.9%  Excellent glucose control  No weight loss  GI intolerance to Mounjaro at current dose     RECOMMENDATIONS  Decrease Mounjaro to 5 mg every 7 days     Follow up 6 months         [1]  Family History  Problem Relation Name Age of Onset   • Diabetes Mother     • COPD Mother     • Thyroid disease Mother          [1]  Family History  Problem Relation Name Age of Onset   • Diabetes Mother     • COPD Mother     • Thyroid disease Mother

## 2025-06-02 NOTE — PATIENT INSTRUCTIONS
A1c 5.9%    RECOMMENDATIONS  Decrease Mounjaro to 5 mg every 7 days     Follow up 6 months    Eye exam annually

## 2025-06-05 ENCOUNTER — PHARMACY VISIT (OUTPATIENT)
Dept: PHARMACY | Facility: CLINIC | Age: 65
End: 2025-06-05
Payer: COMMERCIAL

## 2025-06-30 PROCEDURE — RXMED WILLOW AMBULATORY MEDICATION CHARGE

## 2025-07-02 ENCOUNTER — PHARMACY VISIT (OUTPATIENT)
Dept: PHARMACY | Facility: CLINIC | Age: 65
End: 2025-07-02
Payer: COMMERCIAL

## 2025-08-06 PROCEDURE — RXMED WILLOW AMBULATORY MEDICATION CHARGE

## 2025-08-07 DIAGNOSIS — K21.9 GASTROESOPHAGEAL REFLUX DISEASE WITHOUT ESOPHAGITIS: Primary | ICD-10-CM

## 2025-08-07 PROCEDURE — RXMED WILLOW AMBULATORY MEDICATION CHARGE

## 2025-08-07 RX ORDER — OMEPRAZOLE 20 MG/1
20 TABLET, DELAYED RELEASE ORAL
Qty: 90 TABLET | Refills: 0 | Status: SHIPPED | OUTPATIENT
Start: 2025-08-07

## 2025-08-08 ENCOUNTER — PHARMACY VISIT (OUTPATIENT)
Dept: PHARMACY | Facility: CLINIC | Age: 65
End: 2025-08-08

## 2025-08-11 ENCOUNTER — PHARMACY VISIT (OUTPATIENT)
Dept: PHARMACY | Facility: CLINIC | Age: 65
End: 2025-08-11

## 2025-08-11 PROCEDURE — RXMED WILLOW AMBULATORY MEDICATION CHARGE

## 2025-08-13 ENCOUNTER — PHARMACY VISIT (OUTPATIENT)
Dept: PHARMACY | Facility: CLINIC | Age: 65
End: 2025-08-13

## 2025-08-13 PROCEDURE — RXMED WILLOW AMBULATORY MEDICATION CHARGE

## 2025-08-18 ENCOUNTER — PHARMACY VISIT (OUTPATIENT)
Dept: PHARMACY | Facility: CLINIC | Age: 65
End: 2025-08-18

## 2025-10-08 ENCOUNTER — APPOINTMENT (OUTPATIENT)
Dept: PRIMARY CARE | Facility: CLINIC | Age: 65
End: 2025-10-08
Payer: COMMERCIAL

## 2025-12-01 ENCOUNTER — APPOINTMENT (OUTPATIENT)
Dept: ENDOCRINOLOGY | Facility: CLINIC | Age: 65
End: 2025-12-01
Payer: COMMERCIAL

## (undated) DEVICE — SYRINGE, 35 CC, LUER LOCK, MONOJECT, W/O CAP, LF

## (undated) DEVICE — SYRINGE, W/CANNULA, VIAL ACCESS, INTERLINK, W/NEEDLE, 15 G

## (undated) DEVICE — GOWN, SURGICAL, SMARTGOWN, XLARGE, STERILE

## (undated) DEVICE — Device

## (undated) DEVICE — TOWEL, SURGICAL, NEURO, O/R, 16 X 26, BLUE, STERILE

## (undated) DEVICE — IRRIGATION SET, CYSTOSCOPY, F/CONSTANT/INTERMITTENT, 8 GTT/CC, 77 IN

## (undated) DEVICE — PREP TRAY, SKIN, DRY, W/GLOVES

## (undated) DEVICE — TUBE, SALEM SUMP, 16 FR X 48IN, ENFIT

## (undated) DEVICE — HOLSTER, JET SAFETY

## (undated) DEVICE — SYRINGE, LUER LOCK, 12ML

## (undated) DEVICE — REST, HEAD, BAGEL, 9 IN

## (undated) DEVICE — SUTURE, V-LOC, 0, 12IN, GS-21, GR 180 ABS

## (undated) DEVICE — SUTURE, VICRYL, 4-0, 18 IN, UNDYED BR PS-2

## (undated) DEVICE — DRESSING, ADHESIVE, ISLAND, TELFA, 2 X 3.75 IN, LF

## (undated) DEVICE — SUTURE, VICRYL, 0, 27 IN, UR-6, VIOLET

## (undated) DEVICE — OCCLUDER, COLPO-PNEUMO

## (undated) DEVICE — CAUTERY, PENCIL, PUSH BUTTON, SMOKE EVAC, 70MM

## (undated) DEVICE — TUBE SET, PNEUMOCLEAR, SMOKE EVACU, HIGH-FLOW

## (undated) DEVICE — BAG, TISSUE RETRIEVAL, 10MM, ANCHOR

## (undated) DEVICE — SYRINGE, 60 CC, LUER LOCK, MONOJECT, W/O CAP, LF

## (undated) DEVICE — TROCAR SYSTEM, BALLOON, KII GELPORT, 12 X 100MM

## (undated) DEVICE — SYRINGE, 60 CC, IRRIGATION, BULB, CONTRO-BULB, PAPER POUCH

## (undated) DEVICE — DRAPE, TIBURON W/ADHESIVE, 19 X 30

## (undated) DEVICE — POSITIONING, THE PINK PAD, PIGAZZI SYSTEM

## (undated) DEVICE — LIGASURE, V SEALER/DIVIDER  5MM BLUNT TIP

## (undated) DEVICE — SYSTEM, FIOS FIRST ENTRY, 5 X 100MM, KII ADVANCED FIXATION

## (undated) DEVICE — MANIFOLD, 4 PORT NEPTUNE STANDARD

## (undated) DEVICE — ELECTRODE, OPTI2 LAPAROSCOPIC SPATULA, CURVED

## (undated) DEVICE — COVER, CART, 45 X 27 X 48 IN, CLEAR

## (undated) DEVICE — DRAPE, PAD, PREP, W/ 9 IN CUFF, 24 X 41, LF, NS